# Patient Record
Sex: FEMALE | Race: WHITE | NOT HISPANIC OR LATINO | Employment: FULL TIME | URBAN - METROPOLITAN AREA
[De-identification: names, ages, dates, MRNs, and addresses within clinical notes are randomized per-mention and may not be internally consistent; named-entity substitution may affect disease eponyms.]

---

## 2017-01-13 ENCOUNTER — GENERIC CONVERSION - ENCOUNTER (OUTPATIENT)
Dept: OTHER | Facility: OTHER | Age: 27
End: 2017-01-13

## 2017-01-23 ENCOUNTER — ALLSCRIPTS OFFICE VISIT (OUTPATIENT)
Dept: OTHER | Facility: OTHER | Age: 27
End: 2017-01-23

## 2017-01-25 LAB — PAP (HISTORICAL): NORMAL

## 2018-01-10 NOTE — MISCELLANEOUS
Message   Recorded as Task   Date: 02/04/2016 09:20 AM, Created By: Marcello Bustamante   Task Name: Med Renewal Request   Assigned To: Parker Marrero   Regarding Patient: Elvia Tim, Status: In Progress   GénesisRhdahlia Noon - 04 Feb 2016 9:20 AM     TASK CREATED  Caller: Self; Renew Medication; [phone] (Home)  Pt left a voicemail for a renewal of her RX  Please Advise  SERVANDO Dinh Gen - 04 Feb 2016 9:21 AM     TASK IN PROGRESS   Janiya Nieto - 04 Feb 2016 9:24 AM     TASK REPLIED TO: Previously Assigned To [facility],Team  Pt will call pharmacy and see what is going on because OCP was just refilled in January 2016 for the year  Active Problems    1  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

## 2018-01-14 VITALS
HEIGHT: 67 IN | WEIGHT: 154 LBS | DIASTOLIC BLOOD PRESSURE: 72 MMHG | SYSTOLIC BLOOD PRESSURE: 110 MMHG | BODY MASS INDEX: 24.17 KG/M2

## 2018-01-15 NOTE — MISCELLANEOUS
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

## 2018-01-16 NOTE — PROGRESS NOTES
Assessment    1  Encounter for gynecological examination without abnormal finding (V72 31) (Z01 419)    Plan  Encounter for gynecological examination without abnormal finding    · Follow-up visit in 1 year Evaluation and Treatment  Follow-up  Status: Hold For -  Scheduling  Requested for: 37YBZ6754   Ordered; For: Encounter for gynecological examination without abnormal finding; Ordered By: Holly Blair Performed:  Due: 35YXO9230  PMH: Contraceptives    · Minastrin 24 Fe 1-20 MG-MCG(24) Oral Tablet Chewable; one tab daily as  directed   Rx By: Holly Blair; Dispense: 0 Days ; #:28 Tablet Chewable; Refill: 12; For: PMH: Contraceptives; HARIS = N; Sent To: [facility]    Discussion/Summary  healthy adult female the risks and benefits of cervical cancer screening were discussed cervical cancer screening is current next cervical cancer screening is due 2017 Breast cancer screening: the risks and benefits of breast cancer screening were discussed and monthly self breast exam was advised  Advice and education were given regarding weight bearing exercise, calcium supplements and vitamin D supplements  Chief Complaint  Pt here for her yearly exam, she has no complaints today  History of Present Illness  HPI: 22year old white female here for yearly exam, no complaints  She is amenorrheic on Minastrin 24  She is sexually active with a partner of 6 years  She declines STD testing  She has her degree in [employer] and is looking for a job  She would love to work for [employer]  She is currently working at [employer] as a  and works as a  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 [facility]  GYN HM, Adult Female [facility]: The patient is being seen for a health maintenance evaluation  The last health maintenance visit was 1 year(s) ago  General Health:   Lifestyle:  She exercises regularly  She exercises 3 or more times per week  She does not use tobacco  She consumes alcohol   She reports occasional alcohol use  She denies drug use  Reproductive health: the patient is premenopausal   she reports no menstrual problems  she uses contraception  For contraception, she uses oral contraception pills  she is sexually active  she denies prior pregnancies  Screening: Cervical cancer screening includes a pap smear performed 4/22/2014,neg  Active Problems    1  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)   2  Fatigue (780 79) (R53 83)    Past Medical History    · History of Contraceptives (V25 02)   · History of Dysmenorrhea (625 3) (N94 6)   · History of breast lump (V13 89) (P02 034)   · History of dysmenorrhea (V13 29) (Z87 42)   · History of sebaceous cyst (V13 3) (Z87 2)   · History of Routine Gynecological Exam With Cervical Pap Smear (V72 31)    Surgical History    · History of Denial Of Any Significant Medical History    Family History    · Family history of Acute Myocardial Infarction (V17 3)   · Family history of Arthritis (V17 7)   · Family history of Cancer   · Family history of Diabetes Mellitus (V18 0)   · Family history of Hypertension (V17 49)   · Family history of Reported Family History Of Heart Disease   · Family history of Stroke Syndrome (V17 1)    · Family history of Reported Family History Of Heart Disease    Social History    · Being A Social Drinker   · Never A Smoker   · Never Used Drugs   · Uses Safety Equipment - Seatbelts    Current Meds   1  Minastrin 24 Fe 1-20 MG-MCG(24) Oral Tablet Chewable; one tab daily as directed; Therapy: 59ZQM5817 to (Last Rx:82Bsy4021)  Requested for: 17XLC0544 Ordered    Allergies    1  No Known Drug Allergies    Vitals   Recorded: 28HAT0438 60:82QS   Systolic 254, LUE, Sitting   Diastolic 88, LUE, Sitting   Height 5 ft 7 in   Weight 150 lb    BMI Calculated 23 49   BSA Calculated 1 79   LMP Amenorrhea     Physical Exam    Constitutional   General appearance: No acute distress, well appearing and well nourished      Neck   Neck: Normal, supple, trachea midline, no masses  Genitourinary   External genitalia: Normal and no lesions appreciated  Vagina: Normal, no lesions or dryness appreciated  Urethra: Normal     Urethral meatus: Normal     Bladder: Normal, soft, non-tender and no prolapse or masses appreciated  Cervix: Normal, no palpable masses  Uterus: Normal, non-tender, not enlarged, and no palpable masses  Adnexa/parametria: Normal, non-tender and no fullness or masses appreciated  Chest   Breasts: Normal and no dimpling or skin changes noted  Abdomen   Abdomen: Normal, non-tender, and no organomegaly noted  Lymphatic   Palpation of lymph nodes in neck, axillae, groin and/or other locations: No lymphadenopathy or masses noted         Signatures   Electronically signed by : Devorah Landeros, Kristen Malik; Elías 15 2016  2:17PM EST                       (Author)    Electronically signed by : Jemima Adam MD; Elías 15 2016  2:33PM EST

## 2018-01-22 ENCOUNTER — ALLSCRIPTS OFFICE VISIT (OUTPATIENT)
Dept: OTHER | Facility: OTHER | Age: 28
End: 2018-01-22

## 2018-01-23 NOTE — PROGRESS NOTES
Assessment   1  Encounter for gynecological examination without abnormal finding (V72 31) (Z01 419)    Plan   Encounter for gynecological examination without abnormal finding    · Follow-up visit in 1 year Evaluation and Treatment  Follow-up  Status: Hold For -    Scheduling  Requested for: 38CYS7671   Ordered; For: Encounter for gynecological examination without abnormal finding; Ordered By: Jorgito Valles Performed:  Due: 11TFK3268    Discussion/Summary   healthy adult female the risks and benefits of cervical cancer screening were discussed cervical cancer screening is current Breast cancer screening: the risks and benefits of breast cancer screening were discussed and monthly self breast exam was advised  Advice and education were given regarding weight bearing exercise, calcium supplements and vitamin D supplements  Chief Complaint   Pt is here for her yearly exam, she has no complaints  History of Present Illness   HPI: 32year old white female here for yearly exam, no complaints  She is amenorrheic on Lo Loestrin and is happy with this  She has the same partner and they are now engaged without a date set  She continues to work at [employer] and loves her job  She has no insurance  GYN HM, Adult Female [facility]: The patient is being seen for a health maintenance evaluation  The last health maintenance visit was 1 year(s) ago  General Health: The patient's health since the last visit is described as good  Lifestyle:  She consumes a diverse and healthy diet  -- She does not have any weight concerns  -- She exercises regularly  She exercises less than three times a week  -- She does not use tobacco -- She consumes alcohol  She reports occasional alcohol use  -- She denies drug use  Reproductive health: the patient is premenopausal--   she uses contraception  For contraception, she uses oral contraception pills  -- she is sexually active  -- she denies prior pregnancies      Screening: Cervical cancer screening includes a pap smear performed 1/23/2017,neg  Active Problems   1  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)   2  Encounter for gynecological examination without abnormal finding (V72 31) (Z01 419)    Past Medical History    · History of Contraceptives (V25 02)   · History of Dysmenorrhea (625 3) (N94 6)   · History of breast lump (V13 89) (E19 396)   · History of dysmenorrhea (V13 29) (Z87 42)   · History of sebaceous cyst (V13 3) (Z87 2)   · History of Routine Gynecological Exam With Cervical Pap Smear (V72 31)    Surgical History    · History of Denial Of Any Significant Medical History    Family History   Mother    · Family history of Acute Myocardial Infarction (V17 3)   · Family history of Arthritis (V17 7)   · Family history of Cancer   · Family history of Diabetes Mellitus (V18 0)   · Family history of Hypertension (V17 49)   · Family history of Reported Family History Of Heart Disease   · Family history of Stroke Syndrome (V17 1)  Father    · Family history of Reported Family History Of Heart Disease    Social History    · Being A Social Drinker   · Never A Smoker   · Never Used Drugs   · Uses Safety Equipment - Seatbelts    Current Meds    1  Biotin CAPS; Therapy: (Teressa Hemp) to Recorded   2  Lo Loestrin Fe 1 MG-10 MCG / 10 MCG Oral Tablet; One po daily; Therapy: 31ATV8257 to (Evaluate:17Xok6214); Last Rx:23Jan2017 Ordered   3  Multivitamins TABS; Therapy: (Recorded:22Jan2018) to Recorded    Allergies   1  No Known Drug Allergies    Vitals    Recorded: 57TRE6410 65:02GV   Systolic 922, LUE, Sitting   Diastolic 78, LUE, Sitting   Height 5 ft 7 in   Weight 161 lb    BMI Calculated 25 22   BSA Calculated 1 84   LMP Amenorrhea     Physical Exam        Constitutional      General appearance: No acute distress, well appearing and well nourished  Neck      Neck: Normal, supple, trachea midline, no masses         Genitourinary      External genitalia: Normal and no lesions appreciated  Vagina: Normal, no lesions or dryness appreciated  Urethra: Normal        Urethral meatus: Normal        Bladder: Normal, soft, non-tender and no prolapse or masses appreciated  Cervix: Normal, no palpable masses  Uterus: Normal, non-tender, not enlarged, and no palpable masses  Adnexa/parametria: Normal, non-tender and no fullness or masses appreciated  Chest      Breasts: Normal and no dimpling or skin changes noted  Abdomen      Abdomen: Normal, non-tender, and no organomegaly noted  Lymphatic      Palpation of lymph nodes in neck, axillae, groin and/or other locations: No lymphadenopathy or masses noted         Signatures    Electronically signed by : Lilian Corley, [facility]; Jan 22 2018 11:56AM EST                       (Author)     Electronically signed by : Lemuel Taylor MD; Jan 22 2018 12:30PM EST

## 2018-12-03 ENCOUNTER — TELEPHONE (OUTPATIENT)
Dept: OBGYN CLINIC | Facility: CLINIC | Age: 28
End: 2018-12-03

## 2018-12-03 NOTE — TELEPHONE ENCOUNTER
Pt is self pay and cannot afford to come for her next yearly  She wants to try a mail in  [address] [place] [address] Rd- can she get a prescription to ? Pt wants the progesterone only pill that skips the placebo pills  Pt will be out of pills in 2 wks  She wants to use a mail order for the prescription  Is not due for a yearly till after 1/18/19  Can she get an rx? There are no appointments available with [facility] in this time frame

## 2018-12-03 NOTE — TELEPHONE ENCOUNTER
When I saw her last she was on Lo Loestrin so I'm not sure where the progesterone only idea is coming in    I have no problem giving her pills x 3 months but will need an appt after that   She had no insurance last time she came in, too

## 2018-12-04 NOTE — TELEPHONE ENCOUNTER
The least inexpensive pill she can find is the sprintec, can you tell her the difference in the two and what it may do to her in terms of side effects

## 2018-12-05 NOTE — TELEPHONE ENCOUNTER
Sorry, I'm not following this  One note says she is going to continue on Lo Loestrin  The next says she wants Sprintec  What does she actually want? They are totally different pills    With Sprintec she should get a regular period that is heavier than on Lo Loestrin

## 2018-12-05 NOTE — TELEPHONE ENCOUNTER
Continuing on the same thread    Pt can only afford sprintec, she just wants to know what your thoughts were on it, I will tell her she will have a longer period on it

## 2019-04-21 ENCOUNTER — APPOINTMENT (OUTPATIENT)
Dept: RADIOLOGY | Facility: MEDICAL CENTER | Age: 29
End: 2019-04-21
Payer: COMMERCIAL

## 2019-04-21 ENCOUNTER — OFFICE VISIT (OUTPATIENT)
Dept: URGENT CARE | Facility: MEDICAL CENTER | Age: 29
End: 2019-04-21
Payer: COMMERCIAL

## 2019-04-21 VITALS
HEIGHT: 67 IN | DIASTOLIC BLOOD PRESSURE: 84 MMHG | OXYGEN SATURATION: 100 % | SYSTOLIC BLOOD PRESSURE: 132 MMHG | RESPIRATION RATE: 18 BRPM | HEART RATE: 90 BPM | WEIGHT: 158.4 LBS | TEMPERATURE: 98.4 F | BODY MASS INDEX: 24.86 KG/M2

## 2019-04-21 DIAGNOSIS — R07.9 CHEST PAIN, UNSPECIFIED TYPE: Primary | ICD-10-CM

## 2019-04-21 DIAGNOSIS — R07.9 CHEST PAIN, UNSPECIFIED TYPE: ICD-10-CM

## 2019-04-21 LAB
ATRIAL RATE: 76 BPM
P AXIS: 66 DEGREES
PR INTERVAL: 128 MS
QRS AXIS: 53 DEGREES
QRSD INTERVAL: 76 MS
QT INTERVAL: 398 MS
QTC INTERVAL: 447 MS
T WAVE AXIS: 53 DEGREES
VENTRICULAR RATE: 76 BPM

## 2019-04-21 PROCEDURE — G0382 LEV 3 HOSP TYPE B ED VISIT: HCPCS | Performed by: PHYSICIAN ASSISTANT

## 2019-04-21 PROCEDURE — 93005 ELECTROCARDIOGRAM TRACING: CPT | Performed by: PHYSICIAN ASSISTANT

## 2019-04-21 PROCEDURE — 93010 ELECTROCARDIOGRAM REPORT: CPT | Performed by: INTERNAL MEDICINE

## 2019-04-21 PROCEDURE — 71046 X-RAY EXAM CHEST 2 VIEWS: CPT

## 2019-05-09 ENCOUNTER — OFFICE VISIT (OUTPATIENT)
Dept: CARDIOLOGY CLINIC | Facility: CLINIC | Age: 29
End: 2019-05-09

## 2019-05-09 VITALS
SYSTOLIC BLOOD PRESSURE: 130 MMHG | WEIGHT: 160 LBS | HEIGHT: 67 IN | BODY MASS INDEX: 25.11 KG/M2 | DIASTOLIC BLOOD PRESSURE: 68 MMHG | HEART RATE: 92 BPM

## 2019-05-09 DIAGNOSIS — R07.9 CHEST PAIN SYNDROME: Primary | ICD-10-CM

## 2019-05-09 PROCEDURE — 99203 OFFICE O/P NEW LOW 30 MIN: CPT | Performed by: INTERNAL MEDICINE

## 2019-05-16 ENCOUNTER — HOSPITAL ENCOUNTER (OUTPATIENT)
Dept: NON INVASIVE DIAGNOSTICS | Facility: CLINIC | Age: 29
Discharge: HOME | End: 2019-05-16

## 2019-05-16 DIAGNOSIS — R07.9 CHEST PAIN SYNDROME: ICD-10-CM

## 2019-05-16 LAB
CHEST PAIN STATEMENT: NORMAL
MAX DIASTOLIC BP: 80 MMHG
MAX HEART RATE: 190 BPM
MAX PREDICTED HEART RATE: 192 BPM
MAX. SYSTOLIC BP: 154 MMHG
PROTOCOL NAME: NORMAL
REASON FOR TERMINATION: NORMAL
TARGET HR FORMULA: NORMAL
TEST INDICATION: NORMAL
TIME IN EXERCISE PHASE: NORMAL

## 2019-05-16 PROCEDURE — 93016 CV STRESS TEST SUPVJ ONLY: CPT | Performed by: INTERNAL MEDICINE

## 2019-05-16 PROCEDURE — 93018 CV STRESS TEST I&R ONLY: CPT | Performed by: INTERNAL MEDICINE

## 2019-05-16 PROCEDURE — 93017 CV STRESS TEST TRACING ONLY: CPT

## 2019-05-20 ENCOUNTER — TELEPHONE (OUTPATIENT)
Dept: CARDIOLOGY CLINIC | Facility: CLINIC | Age: 29
End: 2019-05-20

## 2019-07-19 NOTE — PROGRESS NOTES
Assessment/Plan:  Problem List Items Addressed This Visit        Cardiovascular and Mediastinum    Migraines     Uncontrolled  To consider migraine prophy in future as patient declines today  Start Compazine 10mg q8 hours PRN, restart Maxalt-MLT 10mg PRN  Start Magnesium oxide 200mg QD  Relevant Medications    ibuprofen (MOTRIN) 600 mg tablet    prochlorperazine (COMPAZINE) 10 mg tablet    rizatriptan (MAXALT-MLT) 10 MG disintegrating tablet    Other Relevant Orders    Magnesium (Completed)       Musculoskeletal and Integument    Lumbar herniated disc     Pending Physiatry consult  Genitourinary    Dysmenorrhea     Management per Gyn  On OCP  Relevant Orders    CBC and differential (Completed)       Other    Overweight     Stable  Patient is a  has increased muscle mass  Recommend lifestyle modifications  Relevant Orders    TSH, 3rd generation with Free T4 reflex (Completed)    Iron deficiency anemia     Pending labs  Relevant Medications    ferrous sulfate 325 (65 Fe) mg tablet    Other Relevant Orders    CBC and differential (Completed)    Iron, TIBC and Ferritin Panel    Retic Count (Completed)      Other Visit Diagnoses     Annual physical exam    -  Primary  Recommend lifestyle modifications  Screening for diabetes mellitus        Relevant Orders    Hemoglobin A1C    Fatigue, unspecified type        Relevant Orders    CBC and differential (Completed)    Comprehensive metabolic panel (Completed)    TSH, 3rd generation with Free T4 reflex (Completed)    Magnesium (Completed)    Iron, TIBC and Ferritin Panel    Retic Count (Completed)    Screening for cardiovascular condition        Encounter for immunization        Relevant Orders    TDAP VACCINE GREATER THAN OR EQUAL TO 6YO IM (Completed)    BMI 26 0-26 9,adult        Acute pain of right shoulder      Likely due to rotator cuff strain  Home Exercise Program given    Motrin/Tylenol PRN         Rotator cuff strain, right, initial encounter        Relevant Orders    Ambulatory referral to Physical Medicine Rehab    Ambulatory referral to Physical Therapy    See above  Pain of both hip joints        Relevant Orders    Ambulatory referral to Physical Medicine Rehab    Ambulatory referral to Physical Therapy    Likely due to hip flexor strain  Home Exercise Program given  Motrin/Tylneol PRN  Lumbar back pain        Relevant Orders    Ambulatory referral to Physical Medicine Rehab    Ambulatory referral to Physical Therapy    Myalgia        Relevant Orders    Vitamin D 25 hydroxy    Screening for lipoid disorders        Relevant Orders    Lipid panel (Completed)           Return in about 6 weeks (around 9/2/2019) for Recheck Fatigue, Migraines, Myalgias, OW, Labs  Future Appointments   Date Time Provider Sony De Santiago   8/1/2019  3:00 PM Rafa Delgado, PT [facility]   9/3/2019  1:40 PM Sophia Mariano DO [facility]        Subjective:     Cassidy Choudhury is a 29 y o  female who presents today as a new patient for her medical conditions  New Patient    Previous PCP:  Dr Nicanor Juarez  Reason for Transfer:  [address] seen by previous PCP:  2015  Last Labs:  12/7/15  Last Physical:  2014  Medical Records Requsted:  No      HPI:  Chief Complaint   Patient presents with    Physical Exam    Migraine     known history but getting worse     Fatigue    Spasms     Rt arm and b/l hips  -- Above per clinical staff and reviewed  --      HPI      Today:      Overweight - Watching diet  +Exercise - Works out for 1-2 hours - Cardio, lifting, 6-7 days per week  Fatigue - Symptoms x since 5/19  H/O Fe Def Anemia - used to take oral supplements, then D/C due to GI upset and symptom improvement  Stopped taking Fe supplement a few weeks ago  Right Shoulder Pain / Right Arm Spasm / B/L Hips - Feels spasms are pulling joints out of sockets  Has decreased ROM of R shoulder with overheard reach and behind back  Using Motrin 600mg PRN 2 times per week s benefit  Migraines - Symptoms since 15yo  Previously occurring 1-2 times per month, now 2 times per week for the past few weeks  Chronically poor sleep  Has tension in neck and shoulder  Previously on Maxalt MLT (dose unknown) - sometimes helpful  Using Excedrin PRN - helpful  Improves c sleep, ice pack, bitemporal massage  Denies aura  Occurs upon awakening in the AM or at the end of the day  Bi-temporal headache, across forward, in band distribution  Intense pressure  Worse c movement, photophobia, phonophobia  Sometimes has associated nausea and vertigo  Sometimes lasts 6-12 hours to 2-3 days  Pushes fluids - about 150oz water daily  Herniated Lumbar Disc - s/p LAINEY at 15yo in [place] - helpful  Worsening back pain  Pending appt c Physiology Dr Vandana Flores at [facility] next week  Reviewed:  Labs 12/7/15, Cardio 5/9/19, Gyn 1/22/18    Dysmenorrhea - Sees Gyn Ms  Subhashalisha Fuentes PA-C at [address] [place] [address] yearly  Next appt 1/19 - overdue  Skips placebo week of OCP  Thinks had Td 2008 / 2010 - Td  Overdue for Dentist   Nat Helton q2 years  PHQ-9 Depression Screening    PHQ-9:    Frequency of the following problems over the past two weeks:       Little interest or pleasure in doing things:  0 - not at all  Feeling down, depressed, or hopeless:  0 - not at all  PHQ-2 Score:  0       LINDA-7 Flowsheet Screening      Most Recent Value   Over the last two weeks, how often have you been bothered by the following problems?     Feeling nervous, anxious, or on edge  0   Not being able to stop or control worrying  0   Worrying too much about different things  1   Trouble relaxing   1   Being so restless that it's hard to sit still  0   Becoming easily annoyed or irritable   1   Feeling afraid as if something awful might happen  0   How difficult have these problems made it for you to do your work, take care of things at home, or get along with other people? Not difficult at all   LINDA Score   3        MDQ:  0    The following portions of the patient's history were reviewed and updated as appropriate: allergies, current medications, past family history, past medical history, past social history, past surgical history and problem list       Review of Systems   Constitutional: Positive for fatigue  Negative for appetite change, chills, diaphoresis and fever  Respiratory: Negative for chest tightness and shortness of breath  Cardiovascular: Negative for chest pain  Gastrointestinal: Negative for abdominal pain, blood in stool, diarrhea, nausea and vomiting  Genitourinary: Negative for dysuria  Current Outpatient Medications   Medication Sig Dispense Refill    b complex vitamins capsule Take 1 capsule by mouth daily      ferrous sulfate 325 (65 Fe) mg tablet Take 325 mg by mouth daily with breakfast      ibuprofen (MOTRIN) 600 mg tablet Take 600 mg by mouth every 8 (eight) hours as needed for moderate pain       Multiple Vitamins-Minerals (HAIR SKIN & NAILS ADVANCED PO) Take 1 tablet by mouth daily      Norethin-Eth Estrad-Fe Biphas (LO LOESTRIN FE) 1 MG-10 MCG / 10 MCG TABS Take 1 tablet by mouth daily Rx per Gyn      Multiple Vitamin (MULTIVITAMINS PO) Take 1 tablet by mouth daily       prochlorperazine (COMPAZINE) 10 mg tablet Take 1 tablet (10 mg total) by mouth every 8 (eight) hours as needed for nausea or vomiting (Migraine) 30 tablet 0    rizatriptan (MAXALT-MLT) 10 MG disintegrating tablet Take 1 tablet (10 mg total) by mouth once as needed for migraine (May repeat once 2 hours later if migraine symptoms persist   Max dose 30mg/24 hours ) for up to 1 dose May repeat in 2 hours if needed 9 tablet 0     No current facility-administered medications for this visit          Objective:  /80 (BP Location: Left arm)   Pulse 85   Temp 98 7 °F (37 1 °C)   Resp 12   Ht 5' 6 14" (1 68 m)   Wt 74 2 kg (163 lb 9 6 oz)   LMP 01/01/2012 (Within Years) Comment: Skips placebo week of OCP  SpO2 95%   Breastfeeding? No   BMI 26 29 kg/m²    Wt Readings from Last 3 Encounters:   07/22/19 74 2 kg (163 lb 9 6 oz)   05/09/19 72 6 kg (160 lb)   04/21/19 71 8 kg (158 lb 6 4 oz)      BP Readings from Last 3 Encounters:   07/22/19 122/80   05/09/19 130/68   04/21/19 132/84          Physical Exam   Constitutional: She is oriented to person, place, and time  She appears well-developed and well-nourished  HENT:   Head: Normocephalic and atraumatic  Right Ear: Tympanic membrane, external ear and ear canal normal    Left Ear: Tympanic membrane, external ear and ear canal normal    Nose: Nose normal  Right sinus exhibits no maxillary sinus tenderness and no frontal sinus tenderness  Left sinus exhibits no maxillary sinus tenderness and no frontal sinus tenderness  Mouth/Throat: Uvula is midline, oropharynx is clear and moist and mucous membranes are normal  No tonsillar exudate  Eyes: Pupils are equal, round, and reactive to light  Conjunctivae and EOM are normal    Neck: Neck supple  Cardiovascular: Normal rate, regular rhythm, normal heart sounds and intact distal pulses  Pulmonary/Chest: Effort normal and breath sounds normal    Abdominal: Soft  Bowel sounds are normal  She exhibits no distension and no mass  There is no tenderness  There is no rebound and no guarding  Musculoskeletal: She exhibits no edema  Right shoulder: She exhibits decreased range of motion (External rotation) and pain (With abduction above 90 degrees)  She exhibits no tenderness, no bony tenderness, no swelling, no effusion, no crepitus, no deformity, no laceration, no spasm, normal pulse and normal strength  Left shoulder: Normal         Right hip: Normal         Left hip: She exhibits tenderness (With internal rotation)   She exhibits normal range of motion, normal strength, no bony tenderness, no swelling, no crepitus, no deformity and no laceration  Lumbar back: She exhibits tenderness (B/L Paraspinal L4-L5)  She exhibits normal range of motion, no bony tenderness, no swelling, no edema, no deformity, no laceration, no pain, no spasm and normal pulse  B/L Clavicles stable  B/L Hips stable  Negative SLR B/L   Lymphadenopathy:     She has no cervical adenopathy  Neurological: She is alert and oriented to person, place, and time  She displays normal reflexes  No cranial nerve deficit or sensory deficit  She exhibits normal muscle tone  Coordination normal    Skin: Capillary refill takes less than 2 seconds  No rash noted  Psychiatric: She has a normal mood and affect  Her behavior is normal  Judgment and thought content normal    Nursing note and vitals reviewed  Lab Results:      Lab Results   Component Value Date    WBC 4 09 (L) 07/22/2019    HGB 13 4 07/22/2019    HCT 41 8 07/22/2019     07/22/2019    TRIG 34 07/22/2019    HDL 78 (H) 07/22/2019    ALT 23 07/22/2019    AST 16 07/22/2019     07/01/2014    K 4 5 07/22/2019     07/22/2019    CREATININE 0 86 07/22/2019    BUN 14 07/22/2019    CO2 28 07/22/2019    GLUF 91 07/22/2019     No results found for: URICACID  Invalid input(s): BASENAME Vitamin D    No results found  POCT Labs              BMI Counseling: Body mass index is 26 29 kg/m²  Discussed the patient's BMI with her  The BMI is above average  BMI counseling and education was provided to the patient  Nutrition recommendations include decreasing overall calorie intake  Exercise recommendations include exercising 3-5 times per week

## 2019-07-22 ENCOUNTER — LAB (OUTPATIENT)
Dept: LAB | Facility: CLINIC | Age: 29
End: 2019-07-22
Payer: COMMERCIAL

## 2019-07-22 ENCOUNTER — OFFICE VISIT (OUTPATIENT)
Dept: FAMILY MEDICINE CLINIC | Facility: CLINIC | Age: 29
End: 2019-07-22
Payer: COMMERCIAL

## 2019-07-22 ENCOUNTER — TRANSCRIBE ORDERS (OUTPATIENT)
Dept: LAB | Facility: CLINIC | Age: 29
End: 2019-07-22

## 2019-07-22 VITALS
HEART RATE: 85 BPM | DIASTOLIC BLOOD PRESSURE: 80 MMHG | SYSTOLIC BLOOD PRESSURE: 122 MMHG | OXYGEN SATURATION: 95 % | RESPIRATION RATE: 12 BRPM | WEIGHT: 163.6 LBS | BODY MASS INDEX: 26.29 KG/M2 | HEIGHT: 66 IN | TEMPERATURE: 98.7 F

## 2019-07-22 DIAGNOSIS — M25.552 PAIN OF BOTH HIP JOINTS: ICD-10-CM

## 2019-07-22 DIAGNOSIS — N94.6 DYSMENORRHEA: ICD-10-CM

## 2019-07-22 DIAGNOSIS — M25.511 ACUTE PAIN OF RIGHT SHOULDER: ICD-10-CM

## 2019-07-22 DIAGNOSIS — G43.009 MIGRAINE WITHOUT AURA AND WITHOUT STATUS MIGRAINOSUS, NOT INTRACTABLE: ICD-10-CM

## 2019-07-22 DIAGNOSIS — S46.011A ROTATOR CUFF STRAIN, RIGHT, INITIAL ENCOUNTER: ICD-10-CM

## 2019-07-22 DIAGNOSIS — M79.10 MYALGIA: ICD-10-CM

## 2019-07-22 DIAGNOSIS — Z13.1 SCREENING FOR DIABETES MELLITUS: ICD-10-CM

## 2019-07-22 DIAGNOSIS — M25.551 PAIN OF BOTH HIP JOINTS: ICD-10-CM

## 2019-07-22 DIAGNOSIS — R53.83 FATIGUE, UNSPECIFIED TYPE: ICD-10-CM

## 2019-07-22 DIAGNOSIS — M51.26 LUMBAR HERNIATED DISC: ICD-10-CM

## 2019-07-22 DIAGNOSIS — D50.9 IRON DEFICIENCY ANEMIA, UNSPECIFIED IRON DEFICIENCY ANEMIA TYPE: ICD-10-CM

## 2019-07-22 DIAGNOSIS — Z13.220 SCREENING FOR LIPOID DISORDERS: ICD-10-CM

## 2019-07-22 DIAGNOSIS — E66.3 OVERWEIGHT: ICD-10-CM

## 2019-07-22 DIAGNOSIS — M54.50 LUMBAR BACK PAIN: ICD-10-CM

## 2019-07-22 DIAGNOSIS — Z23 ENCOUNTER FOR IMMUNIZATION: ICD-10-CM

## 2019-07-22 DIAGNOSIS — Z00.00 ANNUAL PHYSICAL EXAM: Primary | ICD-10-CM

## 2019-07-22 DIAGNOSIS — R53.83 FATIGUE, UNSPECIFIED TYPE: Primary | ICD-10-CM

## 2019-07-22 DIAGNOSIS — Z13.6 SCREENING FOR CARDIOVASCULAR CONDITION: ICD-10-CM

## 2019-07-22 PROBLEM — R07.9 CHEST PAIN SYNDROME: Status: RESOLVED | Noted: 2019-05-09 | Resolved: 2019-07-22

## 2019-07-22 LAB
25(OH)D3 SERPL-MCNC: 35.2 NG/ML (ref 30–100)
ALBUMIN SERPL BCP-MCNC: 3.6 G/DL (ref 3.5–5)
ALP SERPL-CCNC: 59 U/L (ref 46–116)
ALT SERPL W P-5'-P-CCNC: 23 U/L (ref 12–78)
ANION GAP SERPL CALCULATED.3IONS-SCNC: 7 MMOL/L (ref 4–13)
AST SERPL W P-5'-P-CCNC: 16 U/L (ref 5–45)
BASOPHILS # BLD AUTO: 0.04 THOUSANDS/ΜL (ref 0–0.1)
BASOPHILS NFR BLD AUTO: 1 % (ref 0–1)
BILIRUB SERPL-MCNC: 0.3 MG/DL (ref 0.2–1)
BUN SERPL-MCNC: 14 MG/DL (ref 5–25)
CALCIUM SERPL-MCNC: 8.6 MG/DL (ref 8.3–10.1)
CHLORIDE SERPL-SCNC: 105 MMOL/L (ref 100–108)
CHOLEST SERPL-MCNC: 166 MG/DL (ref 50–200)
CO2 SERPL-SCNC: 28 MMOL/L (ref 21–32)
CREAT SERPL-MCNC: 0.86 MG/DL (ref 0.6–1.3)
EOSINOPHIL # BLD AUTO: 0.03 THOUSAND/ΜL (ref 0–0.61)
EOSINOPHIL NFR BLD AUTO: 1 % (ref 0–6)
ERYTHROCYTE [DISTWIDTH] IN BLOOD BY AUTOMATED COUNT: 13.1 % (ref 11.6–15.1)
EST. AVERAGE GLUCOSE BLD GHB EST-MCNC: 91 MG/DL
FERRITIN SERPL-MCNC: 12 NG/ML (ref 8–388)
GFR SERPL CREATININE-BSD FRML MDRD: 92 ML/MIN/1.73SQ M
GLUCOSE P FAST SERPL-MCNC: 91 MG/DL (ref 65–99)
HBA1C MFR BLD: 4.8 % (ref 4.2–6.3)
HCT VFR BLD AUTO: 41.8 % (ref 34.8–46.1)
HDLC SERPL-MCNC: 78 MG/DL (ref 40–60)
HGB BLD-MCNC: 13.4 G/DL (ref 11.5–15.4)
IMM GRANULOCYTES # BLD AUTO: 0.01 THOUSAND/UL (ref 0–0.2)
IMM GRANULOCYTES NFR BLD AUTO: 0 % (ref 0–2)
IRON SERPL-MCNC: 147 UG/DL (ref 50–170)
LDLC SERPL CALC-MCNC: 81 MG/DL (ref 0–100)
LYMPHOCYTES # BLD AUTO: 1.52 THOUSANDS/ΜL (ref 0.6–4.47)
LYMPHOCYTES NFR BLD AUTO: 37 % (ref 14–44)
MAGNESIUM SERPL-MCNC: 2.1 MG/DL (ref 1.6–2.6)
MCH RBC QN AUTO: 30.3 PG (ref 26.8–34.3)
MCHC RBC AUTO-ENTMCNC: 32.1 G/DL (ref 31.4–37.4)
MCV RBC AUTO: 95 FL (ref 82–98)
MONOCYTES # BLD AUTO: 0.41 THOUSAND/ΜL (ref 0.17–1.22)
MONOCYTES NFR BLD AUTO: 10 % (ref 4–12)
NEUTROPHILS # BLD AUTO: 2.08 THOUSANDS/ΜL (ref 1.85–7.62)
NEUTS SEG NFR BLD AUTO: 51 % (ref 43–75)
NONHDLC SERPL-MCNC: 88 MG/DL
NRBC BLD AUTO-RTO: 0 /100 WBCS
PLATELET # BLD AUTO: 262 THOUSANDS/UL (ref 149–390)
PMV BLD AUTO: 9.1 FL (ref 8.9–12.7)
POTASSIUM SERPL-SCNC: 4.5 MMOL/L (ref 3.5–5.3)
PROT SERPL-MCNC: 7.2 G/DL (ref 6.4–8.2)
RBC # BLD AUTO: 4.42 MILLION/UL (ref 3.81–5.12)
RETICS # AUTO: NORMAL 10*3/UL (ref 14097–95744)
RETICS # CALC: 1.09 % (ref 0.37–1.87)
SODIUM SERPL-SCNC: 140 MMOL/L (ref 136–145)
TIBC SERPL-MCNC: 483 UG/DL (ref 250–450)
TRIGL SERPL-MCNC: 34 MG/DL
TSH SERPL DL<=0.05 MIU/L-ACNC: 2.66 UIU/ML (ref 0.36–3.74)
WBC # BLD AUTO: 4.09 THOUSAND/UL (ref 4.31–10.16)

## 2019-07-22 PROCEDURE — 80053 COMPREHEN METABOLIC PANEL: CPT

## 2019-07-22 PROCEDURE — 83550 IRON BINDING TEST: CPT

## 2019-07-22 PROCEDURE — 85025 COMPLETE CBC W/AUTO DIFF WBC: CPT

## 2019-07-22 PROCEDURE — 83036 HEMOGLOBIN GLYCOSYLATED A1C: CPT

## 2019-07-22 PROCEDURE — 84443 ASSAY THYROID STIM HORMONE: CPT

## 2019-07-22 PROCEDURE — 36415 COLL VENOUS BLD VENIPUNCTURE: CPT

## 2019-07-22 PROCEDURE — 80061 LIPID PANEL: CPT

## 2019-07-22 PROCEDURE — 99385 PREV VISIT NEW AGE 18-39: CPT | Performed by: FAMILY MEDICINE

## 2019-07-22 PROCEDURE — 83540 ASSAY OF IRON: CPT

## 2019-07-22 PROCEDURE — 85045 AUTOMATED RETICULOCYTE COUNT: CPT

## 2019-07-22 PROCEDURE — 83735 ASSAY OF MAGNESIUM: CPT

## 2019-07-22 PROCEDURE — 82306 VITAMIN D 25 HYDROXY: CPT

## 2019-07-22 PROCEDURE — 82728 ASSAY OF FERRITIN: CPT

## 2019-07-22 PROCEDURE — 99203 OFFICE O/P NEW LOW 30 MIN: CPT | Performed by: FAMILY MEDICINE

## 2019-07-22 PROCEDURE — 90715 TDAP VACCINE 7 YRS/> IM: CPT

## 2019-07-22 PROCEDURE — 90471 IMMUNIZATION ADMIN: CPT

## 2019-07-22 RX ORDER — VITAMIN B COMPLEX
1 CAPSULE ORAL DAILY
COMMUNITY
End: 2020-10-21

## 2019-07-22 RX ORDER — PROCHLORPERAZINE MALEATE 10 MG
10 TABLET ORAL EVERY 8 HOURS PRN
Qty: 30 TABLET | Refills: 0 | Status: SHIPPED | OUTPATIENT
Start: 2019-07-22 | End: 2019-11-20 | Stop reason: SDUPTHER

## 2019-07-22 RX ORDER — FERROUS SULFATE 325(65) MG
325 TABLET ORAL
COMMUNITY
End: 2020-10-21

## 2019-07-22 RX ORDER — IBUPROFEN 600 MG/1
600 TABLET ORAL EVERY 8 HOURS PRN
COMMUNITY
End: 2020-10-21

## 2019-07-22 RX ORDER — FLUCONAZOLE 150 MG/1
150 TABLET ORAL
COMMUNITY
Start: 2017-01-23 | End: 2019-07-22 | Stop reason: ALTCHOICE

## 2019-07-22 RX ORDER — RIZATRIPTAN BENZOATE 10 MG/1
10 TABLET, ORALLY DISINTEGRATING ORAL ONCE AS NEEDED
Qty: 9 TABLET | Refills: 0 | Status: SHIPPED | OUTPATIENT
Start: 2019-07-22 | End: 2019-11-20 | Stop reason: SDUPTHER

## 2019-07-22 RX ORDER — NICOTINE POLACRILEX 2 MG
GUM BUCCAL
COMMUNITY
End: 2019-07-22

## 2019-07-22 NOTE — PROGRESS NOTES
Assessment/Plan:  Problem List Items Addressed This Visit        Cardiovascular and Mediastinum    Migraines     Uncontrolled  To consider migraine prophy in future as patient declines today  Start Compazine 10mg q8 hours PRN, restart Maxalt-MLT 10mg PRN  Start Magnesium oxide 200mg QD  Relevant Medications    ibuprofen (MOTRIN) 600 mg tablet    prochlorperazine (COMPAZINE) 10 mg tablet    rizatriptan (MAXALT-MLT) 10 MG disintegrating tablet    Other Relevant Orders    Magnesium (Completed)       Musculoskeletal and Integument    Lumbar herniated disc     Pending Physiatry consult  Genitourinary    Dysmenorrhea     Management per Gyn  On OCP  Relevant Orders    CBC and differential (Completed)       Other    Overweight     Stable  Patient is a  has increased muscle mass  Recommend lifestyle modifications  Relevant Orders    TSH, 3rd generation with Free T4 reflex (Completed)    Iron deficiency anemia     Pending labs             Relevant Medications    ferrous sulfate 325 (65 Fe) mg tablet    Other Relevant Orders    CBC and differential (Completed)    Iron, TIBC and Ferritin Panel    Retic Count (Completed)      Other Visit Diagnoses     Annual physical exam    -  Primary    Screening for diabetes mellitus        Relevant Orders    Hemoglobin A1C    Fatigue, unspecified type        Relevant Orders    CBC and differential (Completed)    Comprehensive metabolic panel (Completed)    TSH, 3rd generation with Free T4 reflex (Completed)    Magnesium (Completed)    Iron, TIBC and Ferritin Panel    Retic Count (Completed)    Screening for cardiovascular condition        Encounter for immunization        Relevant Orders    TDAP VACCINE GREATER THAN OR EQUAL TO 6YO IM (Completed)    BMI 26 0-26 9,adult        Acute pain of right shoulder        Rotator cuff strain, right, initial encounter        Relevant Orders    Ambulatory referral to Physical Medicine Rehab Ambulatory referral to Physical Therapy    Pain of both hip joints        Relevant Orders    Ambulatory referral to Physical Medicine Rehab    Ambulatory referral to Physical Therapy    Lumbar back pain        Relevant Orders    Ambulatory referral to Physical Medicine Rehab    Ambulatory referral to Physical Therapy    Myalgia        Relevant Orders    Vitamin D 25 hydroxy    Screening for lipoid disorders        Relevant Orders    Lipid panel (Completed)           Return in about 6 weeks (around 9/2/2019) for Recheck Fatigue, Migraines, Myalgias, OW, Labs  Future Appointments   Date Time Provider Sony De Santiago   8/1/2019  3:00 PM Todd Garland, PT [facility]   9/3/2019  1:40 PM Abdifatah Laboy DO [facility]        Subjective:     Cris Cote is a 29 y o  female who presents today for her physical         HPI:  Chief Complaint   Patient presents with    Physical Exam    Migraine     known history but getting worse     Fatigue    Spasms     Rt arm and b/l hips  -- Above per clinical staff and reviewed  --      HPI      Today:    Overweight - Watching diet  +Exercise - Works out for 1-2 hours - Cardio, lifting, 6-7 days per week  Reviewed:  Labs 12/7/15, Cardio 5/9/19, Gyn 1/22/18    Dysmenorrhea - Sees Gyn Ms  Salvador Orellana PA-C at [address] [facility] yearly  Next appt 1/19 - overdue  Skips placebo week of OCP  Thinks had Td 2008 / 2010 - Td  Overdue for Dentist   Camryn Figueroa q2 years  The following portions of the patient's history were reviewed and updated as appropriate: allergies, current medications, past family history, past medical history, past social history, past surgical history and problem list       Review of Systems     See other note        Current Outpatient Medications   Medication Sig Dispense Refill    b complex vitamins capsule Take 1 capsule by mouth daily      ferrous sulfate 325 (65 Fe) mg tablet Take 325 mg by mouth daily with breakfast      ibuprofen (MOTRIN) 600 mg tablet Take 600 mg by mouth every 8 (eight) hours as needed for moderate pain       Multiple Vitamins-Minerals (HAIR SKIN & NAILS ADVANCED PO) Take 1 tablet by mouth daily      Norethin-Eth Estrad-Fe Biphas (LO LOESTRIN FE) 1 MG-10 MCG / 10 MCG TABS Take 1 tablet by mouth daily Rx per Gyn      Multiple Vitamin (MULTIVITAMINS PO) Take 1 tablet by mouth daily       prochlorperazine (COMPAZINE) 10 mg tablet Take 1 tablet (10 mg total) by mouth every 8 (eight) hours as needed for nausea or vomiting (Migraine) 30 tablet 0    rizatriptan (MAXALT-MLT) 10 MG disintegrating tablet Take 1 tablet (10 mg total) by mouth once as needed for migraine (May repeat once 2 hours later if migraine symptoms persist   Max dose 30mg/24 hours ) for up to 1 dose May repeat in 2 hours if needed 9 tablet 0     No current facility-administered medications for this visit  Objective:  /80 (BP Location: Left arm)   Pulse 85   Temp 98 7 °F (37 1 °C)   Resp 12   Ht 5' 6 14" (1 68 m)   Wt 74 2 kg (163 lb 9 6 oz)   LMP 01/01/2012 (Within Years) Comment: Skips placebo week of OCP  SpO2 95%   Breastfeeding? No   BMI 26 29 kg/m²    Wt Readings from Last 3 Encounters:   07/22/19 74 2 kg (163 lb 9 6 oz)   05/09/19 72 6 kg (160 lb)   04/21/19 71 8 kg (158 lb 6 4 oz)      BP Readings from Last 3 Encounters:   07/22/19 122/80   05/09/19 130/68   04/21/19 132/84          Physical Exam    See other note  Physical Exam   Constitutional: She is oriented to person, place, and time  She appears well-developed and well-nourished  HENT:   Head: Normocephalic and atraumatic  Right Ear: Tympanic membrane, external ear and ear canal normal    Left Ear: Tympanic membrane, external ear and ear canal normal    Nose: Nose normal  Right sinus exhibits no maxillary sinus tenderness and no frontal sinus tenderness  Left sinus exhibits no maxillary sinus tenderness and no frontal sinus tenderness     Mouth/Throat: Uvula is midline, oropharynx is clear and moist and mucous membranes are normal  No tonsillar exudate  Eyes: Pupils are equal, round, and reactive to light  Conjunctivae and EOM are normal    Neck: Neck supple  Cardiovascular: Normal rate, regular rhythm, normal heart sounds and intact distal pulses  Pulmonary/Chest: Effort normal and breath sounds normal    Abdominal: Soft  Bowel sounds are normal  She exhibits no distension and no mass  There is no tenderness  There is no rebound and no guarding  Musculoskeletal: She exhibits no edema  Right shoulder: She exhibits decreased range of motion (External rotation) and pain (With abduction above 90 degrees)  She exhibits no tenderness, no bony tenderness, no swelling, no effusion, no crepitus, no deformity, no laceration, no spasm, normal pulse and normal strength  Left shoulder: Normal         Right hip: Normal         Left hip: She exhibits tenderness (With internal rotation)  She exhibits normal range of motion, normal strength, no bony tenderness, no swelling, no crepitus, no deformity and no laceration  Lumbar back: She exhibits tenderness (B/L Paraspinal L4-L5)  She exhibits normal range of motion, no bony tenderness, no swelling, no edema, no deformity, no laceration, no pain, no spasm and normal pulse  B/L Clavicles stable  B/L Hips stable  Negative SLR B/L   Lymphadenopathy:     She has no cervical adenopathy  Neurological: She is alert and oriented to person, place, and time  She displays normal reflexes  No cranial nerve deficit or sensory deficit  She exhibits normal muscle tone  Coordination normal    Skin: Capillary refill takes less than 2 seconds  No rash noted  Psychiatric: She has a normal mood and affect  Her behavior is normal  Judgment and thought content normal    Nursing note and vitals reviewed      Lab Results:      Lab Results   Component Value Date    WBC 4 09 (L) 07/22/2019    HGB 13 4 07/22/2019 HCT 41 8 07/22/2019     07/22/2019    TRIG 34 07/22/2019    HDL 78 (H) 07/22/2019    ALT 23 07/22/2019    AST 16 07/22/2019     07/01/2014    K 4 5 07/22/2019     07/22/2019    CREATININE 0 86 07/22/2019    BUN 14 07/22/2019    CO2 28 07/22/2019    GLUF 91 07/22/2019     No results found for: URICACID  Invalid input(s): BASENAME Vitamin D    No results found       POCT Labs

## 2019-07-22 NOTE — PATIENT INSTRUCTIONS
Wellness Visit for Adults   AMBULATORY CARE:   A wellness visit  is when you see your healthcare provider to get screened for health problems  You can also get advice on how to stay healthy  Write down your questions so you remember to ask them  Ask your healthcare provider how often you should have a wellness visit  What happens at a wellness visit:  Your healthcare provider will ask about your health, and your family history of health problems  This includes high blood pressure, heart disease, and cancer  He or she will ask if you have symptoms that concern you, if you smoke, and about your mood  You may also be asked about your intake of medicines, supplements, food, and alcohol  Any of the following may be done:  · Your weight  will be checked  Your height may also be checked so your body mass index (BMI) can be calculated  Your BMI shows if you are at a healthy weight  · Your blood pressure  and heart rate will be checked  Your temperature may also be checked  · Blood and urine tests  may be done  Blood tests may be done to check your cholesterol levels  Abnormal cholesterol levels increase your risk for heart disease and stroke  You may also need a blood or urine test to check for diabetes if you are at increased risk  Urine tests may be done to look for signs of an infection or kidney disease  · A physical exam  includes checking your heartbeat and lungs with a stethoscope  Your healthcare provider may also check your skin to look for sun damage  · Screening tests  may be recommended  A screening test is done to check for diseases that may not cause symptoms  The screening tests you may need depend on your age, gender, family history, and lifestyle habits  For example, colorectal screening may be recommended if you are 48years old or older  Screening tests you need if you are a woman:   · A Pap smear  is used to screen for cervical cancer   Pap smears are usually done every 3 to 5 years depending on your age  You may need them more often if you have had abnormal Pap smear test results in the past  Ask your healthcare provider how often you should have a Pap smear  · A mammogram  is an x-ray of your breasts to screen for breast cancer  Experts recommend mammograms every 2 years starting at age 48 years  You may need a mammogram at age 52 years or younger if you have an increased risk for breast cancer  Talk to your healthcare provider about when you should start having mammograms and how often you need them  Vaccines you may need:   · Get an influenza vaccine  every year  The influenza vaccine protects you from the flu  Several types of viruses cause the flu  The viruses change over time, so new vaccines are made each year  · Get a tetanus-diphtheria (Td) booster vaccine  every 10 years  This vaccine protects you against tetanus and diphtheria  Tetanus is a severe infection that may cause painful muscle spasms and lockjaw  Diphtheria is a severe bacterial infection that causes a thick covering in the back of your mouth and throat  · Get a human papillomavirus (HPV) vaccine  if you are female and aged 23 to 32 or male 23 to 24 and never received it  This vaccine protects you from HPV infection  HPV is the most common infection spread by sexual contact  HPV may also cause vaginal, penile, and anal cancers  · Get a pneumococcal vaccine  if you are aged 72 years or older  The pneumococcal vaccine is an injection given to protect you from pneumococcal disease  Pneumococcal disease is an infection caused by pneumococcal bacteria  The infection may cause pneumonia, meningitis, or an ear infection  · Get a shingles vaccine  if you are aged 61 or older, even if you have had shingles before  The shingles vaccine is an injection to protect you from the varicella-zoster virus  This is the same virus that causes chickenpox   Shingles is a painful rash that develops in people who had chickenpox or have been exposed to the virus  How to eat healthy:  My Plate is a model for planning healthy meals  It shows the types and amounts of foods that should go on your plate  Fruits and vegetables make up about half of your plate, and grains and protein make up the other half  A serving of dairy is included on the side of your plate  The amount of calories and serving sizes you need depends on your age, gender, weight, and height  Examples of healthy foods are listed below:  · Eat a variety of vegetables  such as dark green, red, and orange vegetables  You can also include canned vegetables low in sodium (salt) and frozen vegetables without added butter or sauces  · Eat a variety of fresh fruits , canned fruit in 100% juice, frozen fruit, and dried fruit  · Include whole grains  At least half of the grains you eat should be whole grains  Examples include whole-wheat bread, wheat pasta, brown rice, and whole-grain cereals such as oatmeal     · Eat a variety of protein foods such as seafood (fish and shellfish), lean meat, and poultry without skin (turkey and chicken)  Examples of lean meats include pork leg, shoulder, or tenderloin, and beef round, sirloin, tenderloin, and extra lean ground beef  Other protein foods include eggs and egg substitutes, beans, peas, soy products, nuts, and seeds  · Choose low-fat dairy products such as skim or 1% milk or low-fat yogurt, cheese, and cottage cheese  · Limit unhealthy fats  such as butter, hard margarine, and shortening  Exercise:  Exercise at least 30 minutes per day on most days of the week  Some examples of exercise include walking, biking, dancing, and swimming  You can also fit in more physical activity by taking the stairs instead of the elevator or parking farther away from stores  Include muscle strengthening activities 2 days each week  Regular exercise provides many health benefits   It helps you manage your weight, and decreases your risk for type 2 diabetes, heart disease, stroke, and high blood pressure  Exercise can also help improve your mood  Ask your healthcare provider about the best exercise plan for you  General health and safety guidelines:   · Do not smoke  Nicotine and other chemicals in cigarettes and cigars can cause lung damage  Ask your healthcare provider for information if you currently smoke and need help to quit  E-cigarettes or smokeless tobacco still contain nicotine  Talk to your healthcare provider before you use these products  · Limit alcohol  A drink of alcohol is 12 ounces of beer, 5 ounces of wine, or 1½ ounces of liquor  · Lose weight, if needed  Being overweight increases your risk of certain health conditions  These include heart disease, high blood pressure, type 2 diabetes, and certain types of cancer  · Protect your skin  Do not sunbathe or use tanning beds  Use sunscreen with a SPF 15 or higher  Apply sunscreen at least 15 minutes before you go outside  Reapply sunscreen every 2 hours  Wear protective clothing, hats, and sunglasses when you are outside  · Drive safely  Always wear your seatbelt  Make sure everyone in your car wears a seatbelt  A seatbelt can save your life if you are in an accident  Do not use your cell phone when you are driving  This could distract you and cause an accident  Pull over if you need to make a call or send a text message  · Practice safe sex  Use latex condoms if are sexually active and have more than one partner  Your healthcare provider may recommend screening tests for sexually transmitted infections (STIs)  · Wear helmets, lifejackets, and protective gear  Always wear a helmet when you ride a bike or motorcycle, go skiing, or play sports that could cause a head injury  Wear protective equipment when you play sports  Wear a lifejacket when you are on a boat or doing water sports    © 2017 [employer] Information is for End User's use only and may not be sold, redistributed or otherwise used for commercial purposes  All illustrations and images included in CareNotes® are the copyrighted property of [employer]  or [employer]  The above information is an  only  It is not intended as medical advice for individual conditions or treatments  Talk to your doctor, nurse or pharmacist before following any medical regimen to see if it is safe and effective for you  Weight Management   AMBULATORY CARE:   Why it is important to manage your weight:  Being overweight increases your risk of health conditions such as heart disease, high blood pressure, type 2 diabetes, and certain types of cancer  It can also increase your risk for osteoarthritis, sleep apnea, and other respiratory problems  Aim for a slow, steady weight loss  Even a small amount of weight loss can lower your risk of health problems  How to lose weight safely:  A safe and healthy way to lose weight is to eat fewer calories and get regular exercise  You can lose up about 1 pound a week by decreasing the number of calories you eat by 500 calories each day  You can decrease calories by eating smaller portion sizes or by cutting out high-calorie foods  Read labels to find out how many calories are in the foods you eat  You can also burn calories with exercise such as walking, swimming, or biking  You will be more likely to keep weight off if you make these changes part of your lifestyle  Healthy meal plan for weight management:  A healthy meal plan includes a variety of foods, contains fewer calories, and helps you stay healthy  A healthy meal plan includes the following:  · Eat whole-grain foods more often  A healthy meal plan should contain fiber  Fiber is the part of grains, fruits, and vegetables that is not broken down by your body  Whole-grain foods are healthy and provide extra fiber in your diet   Some examples of whole-grain foods are whole-wheat breads and pastas, oatmeal, brown rice, and bulgur  · Eat a variety of vegetables every day  Include dark, leafy greens such as spinach, kale, gail greens, and mustard greens  Eat yellow and orange vegetables such as carrots, sweet potatoes, and winter squash  · Eat a variety of fruits every day  Choose fresh or canned fruit (canned in its own juice or light syrup) instead of juice  Fruit juice has very little or no fiber  · Eat low-fat dairy foods  Drink fat-free (skim) milk or 1% milk  Eat fat-free yogurt and low-fat cottage cheese  Try low-fat cheeses such as mozzarella and other reduced-fat cheeses  · Choose meat and other protein foods that are low in fat  Choose beans or other legumes such as split peas or lentils  Choose fish, skinless poultry (chicken or turkey), or lean cuts of red meat (beef or pork)  Before you cook meat or poultry, cut off any visible fat  · Use less fat and oil  Try baking foods instead of frying them  Add less fat, such as margarine, sour cream, regular salad dressing and mayonnaise to foods  Eat fewer high-fat foods  Some examples of high-fat foods include french fries, doughnuts, ice cream, and cakes  · Eat fewer sweets  Limit foods and drinks that are high in sugar  This includes candy, cookies, regular soda, and sweetened drinks  Ways to decrease calories:   · Eat smaller portions  ¨ Use a small plate with smaller servings  ¨ Do not eat second helpings  ¨ When you eat at a restaurant, ask for a box and place half of your meal in the box before you eat  ¨ Share an entrée with someone else  · Replace high-calorie snacks with healthy, low-calorie snacks  ¨ Choose fresh fruit, vegetables, fat-free rice cakes, or air-popped popcorn instead of potato chips, nuts, or chocolate  ¨ Choose water or calorie-free drinks instead of soda or sweetened drinks  · Eat regular meals  Skipping meals can lead to overeating later in the day   Eat a healthy snack in place of a meal if you do not have time to eat a regular meal      · Do not shop for groceries when you are hungry  You may be more likely to make unhealthy food choices  Take a grocery list of healthy foods and shop after you have eaten  Exercise:  Exercise at least 30 minutes per day on most days of the week  Some examples of exercise include walking, biking, dancing, and swimming  You can also fit in more physical activity by taking the stairs instead of the elevator or parking farther away from stores  Ask your healthcare provider about the best exercise plan for you  Other things to consider as you try to lose weight:   · Be aware of situations that may give you the urge to overeat, such as eating while watching television  Find ways to avoid these situations  For example, read a book, go for a walk, or do crafts  · Meet with a weight loss support group or friends who are also trying to lose weight  This may help you stay motivated to continue working on your weight loss goals  © 2017 [address] [employer] Information is for End User's use only and may not be sold, redistributed or otherwise used for commercial purposes  All illustrations and images included in CareNotes® are the copyrighted property of [employer]  or [employer]  The above information is an  only  It is not intended as medical advice for individual conditions or treatments  Talk to your doctor, nurse or pharmacist before following any medical regimen to see if it is safe and effective for you  Low Fat Diet   AMBULATORY CARE:   A low-fat diet  is an eating plan that is low in total fat, unhealthy fat, and cholesterol  You may need to follow a low-fat diet if you have trouble digesting or absorbing fat  You may also need to follow this diet if you have high cholesterol  You can also lower your cholesterol by increasing the amount of fiber in your diet   Soluble fiber is a type of fiber that helps to decrease cholesterol levels  Different types of fat in food:   · Limit unhealthy fats  A diet that is high in cholesterol, saturated fat, and trans fat may cause unhealthy cholesterol levels  Unhealthy cholesterol levels increase your risk of heart disease  ¨ Cholesterol:  Limit intake of cholesterol to less than 200 mg per day  Cholesterol is found in meat, eggs, and dairy  ¨ Saturated fat:  Limit saturated fat to less than 7% of your total daily calories  Ask your dietitian how many calories you need each day  Saturated fat is found in butter, cheese, ice cream, whole milk, and palm oil  Saturated fat is also found in meat, such as beef, pork, chicken skin, and processed meats  Processed meats include sausage, hot dogs, and bologna  ¨ Trans fat:  Avoid trans fat as much as possible  Trans fat is used in fried and baked foods  Foods that say trans fat free on the label may still have up to 0 5 grams of trans fat per serving  · Include healthy fats  Replace foods that are high in saturated and trans fat with foods high in healthy fats  This may help to decrease high cholesterol levels  ¨ Monounsaturated fats: These are found in avocados, nuts, and vegetable oils, such as olive, canola, and sunflower oil  ¨ Polyunsaturated fats: These can be found in vegetable oils, such as soybean or corn oil  Omega-3 fats can help to decrease the risk of heart disease  Omega-3 fats are found in fish, such as salmon, herring, trout, and tuna  Omega-3 fats can also be found in plant foods, such as walnuts, flaxseed, soybeans, and canola oil    Foods to limit or avoid:   · Grains:      ¨ Snacks that are made with partially hydrogenated oils, such as chips, regular crackers, and butter-flavored popcorn    ¨ High-fat baked goods, such as biscuits, croissants, doughnuts, pies, cookies, and pastries    · Dairy:      ¨ Whole milk, 2% milk, and yogurt and ice cream made with whole milk    ¨ Half and half creamer, heavy cream, and whipping cream    ¨ Cheese, cream cheese, and sour cream    · Meats and proteins:      ¨ High-fat cuts of meat (T-bone steak, regular hamburger, and ribs)    ¨ Fried meat, poultry (turkey and chicken), and fish    ¨ Poultry (chicken and turkey) with skin    ¨ Cold cuts (salami or bologna), hot dogs, knox, and sausage    ¨ Whole eggs and egg yolks    · Vegetables and fruits with added fat:      ¨ Fried vegetables or vegetables in butter or high-fat sauces, such as cream or cheese sauces    ¨ Fried fruit or fruit served with butter or cream    · Fats:      ¨ Butter, stick margarine, and shortening    ¨ Coconut, palm oil, and palm kernel oil  Foods to include:   · Grains:      ¨ Whole-grain breads, cereals, pasta, and brown rice    ¨ Low-fat crackers and pretzels    · Vegetables and fruits:      ¨ Fresh, frozen, or canned vegetables (no salt or low-sodium)    ¨ Fresh, frozen, dried, or canned fruit (canned in light syrup or fruit juice)    ¨ Avocado    · Low-fat dairy products:      ¨ Nonfat (skim) or 1% milk    ¨ Nonfat or low-fat cheese, yogurt, and cottage cheese    · Meats and proteins:      ¨ Chicken or turkey with no skin    ¨ Baked or broiled fish    ¨ Lean beef and pork (loin, round, extra lean hamburger)    ¨ Beans and peas, unsalted nuts, soy products    ¨ Egg whites and substitutes    ¨ Seeds and nuts    · Fats:      ¨ Unsaturated oil, such as canola, olive, peanut, soybean, or sunflower oil    ¨ Soft or liquid margarine and vegetable oil spread    ¨ Low-fat salad dressing  Other ways to decrease fat:   · Read food labels before you buy foods  Choose foods that have less than 30% of calories from fat  Choose low-fat or fat-free dairy products  Remember that fat free does not mean calorie free  These foods still contain calories, and too many calories can lead to weight gain  · Trim fat from meat and avoid fried food  Trim all visible fat from meat before you cook it   Remove the skin from poultry  Do not vogel meat, fish, or poultry  Bake, roast, boil, or broil these foods instead  Avoid fried foods  Eat a baked potato instead of [place] [employer]ie fries  Steam vegetables instead of sautéing them in butter  · Add less fat to foods  Use imitation knox bits on salads and baked potatoes instead of regular knox bits  Use fat-free or low-fat salad dressings instead of regular dressings  Use low-fat or nonfat butter-flavored topping instead of regular butter or margarine on popcorn and other foods  Ways to decrease fat in recipes:  Replace high-fat ingredients with low-fat or nonfat ones  This may cause baked goods to be drier than usual  You may need to use nonfat cooking spray on pans to prevent food from sticking  You also may need to change the amount of other ingredients, such as water, in the recipe  Try the following:  · Use low-fat or light margarine instead of regular margarine or shortening  · Use lean ground turkey breast or chicken, or lean ground beef (less than 5% fat) instead of hamburger  · Add 1 teaspoon of canola oil to 8 ounces of skim milk instead of using cream or half and half  · Use grated zucchini, carrots, or apples in breads instead of coconut  · Use blenderized, low-fat cottage cheese, plain tofu, or low-fat ricotta cheese instead of cream cheese  · Use 1 egg white and 1 teaspoon of canola oil, or use ¼ cup (2 ounces) of fat-free egg substitute instead of a whole egg  · Replace half of the oil that is called for in a recipe with applesauce when you bake  Use 3 tablespoons of cocoa powder and 1 tablespoon of canola oil instead of a square of baking chocolate  How to increase fiber:  Eat enough high-fiber foods to get 20 to 30 grams of fiber every day  Slowly increase your fiber intake to avoid stomach cramps, gas, and other problems  · Eat 3 ounces of whole-grain foods each day  An ounce is about 1 slice of bread   Eat whole-grain breads, such as whole-wheat bread  Whole wheat, whole-wheat flour, or other whole grains should be listed as the first ingredient on the food label  Replace white flour with whole-grain flour or use half of each in recipes  Whole-grain flour is heavier than white flour, so you may have to add more yeast or baking powder  · Eat a high-fiber cereal for breakfast   Oatmeal is a good source of soluble fiber  Look for cereals that have bran or fiber in the name  Choose whole-grain products, such as brown rice, barley, and whole-wheat pasta  · Eat more beans, peas, and lentils  For example, add beans to soups or salads  Eat at least 5 cups of fruits and vegetables each day  Eat fruits and vegetables with the peel because the peel is high in fiber  © 2017 [address] [employer] Information is for End User's use only and may not be sold, redistributed or otherwise used for commercial purposes  All illustrations and images included in CareNotes® are the copyrighted property of [employer]  or [employer]  The above information is an  only  It is not intended as medical advice for individual conditions or treatments  Talk to your doctor, nurse or pharmacist before following any medical regimen to see if it is safe and effective for you  Heart Healthy Diet   AMBULATORY CARE:   A heart healthy diet  is an eating plan low in total fat, unhealthy fats, and sodium (salt)  A heart healthy diet helps decrease your risk for heart disease and stroke  Limit the amount of fat you eat to 25% to 35% of your total daily calories  Limit sodium to less than 2,300 mg each day  Healthy fats:  Healthy fats can help improve cholesterol levels  The risk for heart disease is decreased when cholesterol levels are normal  Choose healthy fats, such as the following:  · Unsaturated fat  is found in foods such as soybean, canola, olive, corn, and safflower oils   It is also found in soft tub margarine that is made with liquid vegetable oil  · Omega-3 fat  is found in certain fish, such as salmon, tuna, and trout, and in walnuts and flaxseed  Unhealthy fats:  Unhealthy fats can cause unhealthy cholesterol levels in your blood and increase your risk of heart disease  Limit unhealthy fats, such as the following:  · Cholesterol  is found in animal foods, such as eggs and lobster, and in dairy products made from whole milk  Limit cholesterol to less than 300 milligrams (mg) each day  You may need to limit cholesterol to 200 mg each day if you have heart disease  · Saturated fat  is found in meats, such as knox and hamburger  It is also found in chicken or turkey skin, whole milk, and butter  Limit saturated fat to less than 7% of your total daily calories  Limit saturated fat to less than 6% if you have heart disease or are at increased risk for it  · Trans fat  is found in packaged foods, such as potato chips and cookies  It is also in hard margarine, some fried foods, and shortening  Avoid trans fats as much as possible    Heart healthy foods and drinks to include:  Ask your dietitian or healthcare provider how many servings to have from each of the following food groups:  · Grains:      ¨ Whole-wheat breads, cereals, and pastas, and brown rice    ¨ Low-fat, low-sodium crackers and chips    · Vegetables:      ¨ Broccoli, green beans, green peas, and spinach    ¨ Collards, kale, and lima beans    ¨ Carrots, sweet potatoes, tomatoes, and peppers    ¨ Canned vegetables with no salt added    · Fruits:      ¨ Bananas, peaches, pears, and pineapple    ¨ Grapes, raisins, and dates    ¨ Oranges, tangerines, grapefruit, orange juice, and grapefruit juice    ¨ Apricots, mangoes, melons, and papaya    ¨ Raspberries and strawberries    ¨ Canned fruit with no added sugar    · Low-fat dairy products:      ¨ Nonfat (skim) milk, 1% milk, and low-fat almond, cashew, or soy milks fortified with calcium    ¨ Low-fat cheese, regular or frozen yogurt, and cottage cheese    · Meats and proteins , such as lean cuts of beef and pork (loin, leg, round), skinless chicken and turkey, legumes, soy products, egg whites, and nuts  Foods and drinks to limit or avoid:  Ask your dietitian or healthcare provider about these and other foods that are high in unhealthy fat, sodium, and sugar:  · Snack or packaged foods , such as frozen dinners, cookies, macaroni and cheese, and cereals with more than 300 mg of sodium per serving    · Canned or dry mixes  for cakes, soups, sauces, or gravies    · Vegetables with added sodium , such as instant potatoes, vegetables with added sauces, or regular canned vegetables    · Other foods high in sodium , such as ketchup, barbecue sauce, salad dressing, pickles, olives, soy sauce, and miso    · High-fat dairy foods  such as whole or 2% milk, cream cheese, or sour cream, and cheeses     · High-fat protein foods  such as high-fat cuts of beef (T-bone steaks, ribs), chicken or turkey with skin, and organ meats, such as liver    · Cured or smoked meats , such as hot dogs, knox, and sausage    · Unhealthy fats and oils , such as butter, stick margarine, shortening, and cooking oils such as coconut or palm oil    · Food and drinks high in sugar , such as soft drinks (soda), sports drinks, sweetened tea, candy, cake, cookies, pies, and doughnuts  Other diet guidelines to follow:   · Eat more foods containing omega-3 fats  Eat fish high in omega-3 fats at least 2 times a week  · Limit alcohol  Too much alcohol can damage your heart and raise your blood pressure  Women should limit alcohol to 1 drink a day  Men should limit alcohol to 2 drinks a day  A drink of alcohol is 12 ounces of beer, 5 ounces of wine, or 1½ ounces of liquor  · Choose low-sodium foods  High-sodium foods can lead to high blood pressure  Add little or no salt to food you prepare  Use herbs and spices in place of salt      · Eat more fiber  to help lower cholesterol levels  Eat at least 5 servings of fruits and vegetables each day  Eat 3 ounces of whole-grain foods each day  Legumes (beans) are also a good source of fiber  Lifestyle guidelines:   · Do not smoke  Nicotine and other chemicals in cigarettes and cigars can cause lung and heart damage  Ask your healthcare provider for information if you currently smoke and need help to quit  E-cigarettes or smokeless tobacco still contain nicotine  Talk to your healthcare provider before you use these products  · Exercise regularly  to help you maintain a healthy weight and improve your blood pressure and cholesterol levels  Ask your healthcare provider about the best exercise plan for you  Do not start an exercise program without asking your healthcare provider  Follow up with your healthcare provider as directed:  Write down your questions so you remember to ask them during your visits  © 2017 [employer]  Information is for End User's use only and may not be sold, redistributed or otherwise used for commercial purposes  All illustrations and images included in CareNotes® are the copyrighted property of [employer]  or [employer]  The above information is an  only  It is not intended as medical advice for individual conditions or treatments  Talk to your doctor, nurse or pharmacist before following any medical regimen to see if it is safe and effective for you  Calorie Counting Diet   WHAT YOU NEED TO KNOW:   What is a calorie counting diet? It is a meal plan based on counting calories each day to reach a healthy body weight  You will need to eat fewer calories if you are trying to lose weight  Weight loss may decrease your risk for certain health problems or improve your health if you have health problems  Some of these health problems include heart disease, high blood pressure, and diabetes  What foods should I avoid?   Your dietitian will tell you if you need to avoid certain foods based on your body weight and health condition  You may need to avoid high-fat foods if you are at risk for or have heart disease  You may need to eat fewer foods from the breads and starches food group if you have diabetes  How many calories are in foods? The following is a list of foods and drinks with the approximate number of calories in each  Check the food label to find the exact number of calories  A dietitian can tell you how many calories you should have from each food group each day    · Carbohydrate:      ¨ ½ of a 3-inch bagel, 1 slice of bread, or ½ of a hamburger bun or hot dog bun (80)    ¨ 1 (8-inch) flour tortilla or ½ cup of cooked rice (100)    ¨ 1 (6-inch) corn tortilla (80)    ¨ 1 (6-inch) pancake or 1 cup of bran flakes cereal (110)    ¨ ½ cup of cooked cereal (80)    ¨ ½ cup of cooked pasta (85)    ¨ 1 ounce of pretzels (100)    ¨ 3 cups of air-popped popcorn without butter or oil (80)    · Dairy:      ¨ 1 cup of skim or 1% milk (90)    ¨ 1 cup of 2% milk (120)    ¨ 1 cup of whole milk (160)    ¨ 1 cup of 2% chocolate milk (220)    ¨ 1 ounce of low-fat cheese with 3 grams of fat per ounce (70)    ¨ 1 ounce of cheddar cheese (114)    ¨ ½ cup of 1% fat cottage cheese (80)    ¨ 1 cup of plain or sugar-free, fat-free yogurt (90)    · Protein foods:      ¨ 3 ounces of fish (not breaded or fried) (95)    ¨ 3 ounces of breaded, fried fish (195)    ¨ ¾ cup of tuna canned in water (105)    ¨ 3 ounces of chicken breast without skin (105)    ¨ 1 fried chicken breast with skin (350)    ¨ ¼ cup of fat free egg substitute (40)    ¨ 1 large egg (75)    ¨ 3 ounces of lean beef or pork (165)    ¨ 3 ounces of fried pork chop or ham (185)    ¨ ½ cup of cooked dried beans, such as kidney, thompson, lentils, or navy (115)    ¨ 3 ounces of bologna or lunch meat (225)    ¨ 2 links of breakfast sausage (140)    · Vegetables:      ¨ ½ cup of sliced mushrooms (10)    ¨ 1 cup of salad greens, such as lettuce, spinach, or kevin (15)    ¨ ½ cup of steamed asparagus (20)    ¨ ½ cup of cooked summer squash, zucchini squash, or green or wax beans (25)    ¨ 1 cup of broccoli or cauliflower florets, or 1 medium tomato (25)    ¨ 1 large raw carrot or ½ cup of cooked carrots (40)    ¨ ? of a medium cucumber or 1 stalk of celery (5)    ¨ 1 small baked potato (160)    ¨ 1 cup of breaded, fried vegetables (230)    · Fruit:      ¨ 1 (6-inch) banana (55)     ¨ ½ of a 4-inch grapefruit (55)    ¨ 15 grapes (60)    ¨ 1 medium orange or apple (70)    ¨ 1 large peach (65)    ¨ 1 cup of fresh pineapple chunks (75)    ¨ 1 cup of melon cubes (50)    ¨ 1¼ cups of whole strawberries (45)    ¨ ½ cup of fruit canned in juice (55)    ¨ ½ cup of fruit canned in heavy syrup (110)    ¨ ?  cup of raisins (130)    ¨ ½ cup of unsweetened fruit juice (60)    ¨ ½ cup of grape, cranberry, or prune juice (90)    · Fat:      ¨ 10 peanuts or 2 teaspoons of peanut butter (55)    ¨ 2 tablespoons of avocado or 1 tablespoon of regular salad dressing (45)    ¨ 2 slices of knox (90)    ¨ 1 teaspoon of oil, such as safflower, canola, corn, or olive oil (45)    ¨ 2 teaspoons of low-fat margarine, or 1 tablespoon of low-fat mayonnaise (50)    ¨ 1 teaspoon of regular margarine (40)    ¨ 1 tablespoon of regular mayonnaise (135)    ¨ 1 tablespoon of cream cheese or 2 tablespoons of low-fat cream cheese (45)    ¨ 2 tablespoons of vegetable shortening (215)    · Dessert and sweets:      ¨ 8 animal crackers or 5 vanilla wafers (80)    ¨ 1 frozen fruit juice bar (80)    ¨ ½ cup of ice milk or low-fat frozen yogurt (90)    ¨ ½ cup of sherbet or sorbet (125)    ¨ ½ cup of sugar-free pudding or custard (60)    ¨ ½ cup of ice cream (140)    ¨ ½ cup of pudding or custard (175)    ¨ 1 (2-inch) square chocolate brownie (185)    · Combination foods:      ¨ Bean burrito made with an 8-inch tortilla, without cheese (275)    ¨ Chicken breast sandwich with lettuce and tomato (325)    ¨ 1 cup of chicken noodle soup (60)    ¨ 1 beef taco (175)    ¨ Regular hamburger with lettuce and tomato (310)    ¨ Regular cheeseburger with lettuce and tomato (410)     ¨ ¼ of a 12-inch cheese pizza (280)    ¨ Fried fish sandwich with lettuce and tomato (425)    ¨ Hot dog and bun (275)    ¨ 1½ cups of macaroni and cheese (310)    ¨ Taco salad with a fried tortilla shell (870)    · Low-calorie foods:      ¨ 1 tablespoon of ketchup or 1 tablespoon of fat free sour cream (15)    ¨ 1 teaspoon of mustard (5)    ¨ ¼ cup of salsa (20)    ¨ 1 large dill pickle (15)    ¨ 1 tablespoon of fat free salad dressing (10)    ¨ 2 teaspoons of low-sugar, light jam or jelly, or 1 tablespoon of sugar-free syrup (15)    ¨ 1 sugar-free popsicle (15)    ¨ 1 cup of club soda, seltzer water, or diet soda (0)  CARE AGREEMENT:   You have the right to help plan your care  Discuss treatment options with your caregivers to decide what care you want to receive  You always have the right to refuse treatment  The above information is an  only  It is not intended as medical advice for individual conditions or treatments  Talk to your doctor, nurse or pharmacist before following any medical regimen to see if it is safe and effective for you  © 2017 [address] [employer] Information is for End User's use only and may not be sold, redistributed or otherwise used for commercial purposes  All illustrations and images included in CareNotes® are the copyrighted property of [employer]  or [employer]  Please contact your insurance if you are uncertain of coverage for plan of care items  Your insurance may not cover the cost of your Vitamin D blood test, which is approximately $65-70  Please notify the lab prior to blood draw if you would like to decline this test       Start Magnesium oxide 200mg daily to prevent migraines

## 2019-07-22 NOTE — ASSESSMENT & PLAN NOTE
Uncontrolled  To consider migraine prophy in future as patient declines today  Start Compazine 10mg q8 hours PRN, restart Maxalt-MLT 10mg PRN  Start Magnesium oxide 200mg QD

## 2019-07-23 NOTE — PROGRESS NOTES
Assessment/Plan:      Diagnoses and all orders for this visit:    Chronic bilateral low back pain without sciatica  -     XR hip/pelv 2-3 vws left if performed; Future  -     Ambulatory referral to Physical Therapy; Future    Pain of both hip joints  -     Ambulatory referral to Physical Medicine Rehab  -     XR spine lumbar minimum 4 views non injury; Future  -     Ambulatory referral to Physical Therapy; Future    Lumbar back pain  -     Ambulatory referral to Physical Medicine Rehab  -     XR hip/pelv 2-3 vws left if performed; Future    Left hip pain  -     XR spine lumbar minimum 4 views non injury; Future  -     Ambulatory referral to Physical Therapy; Future      Discussion: 28 yo right hand dominant female presenting for chronic back and hip pain  Will order updated XRs and physical therapy  Discussed if no improvement will consider referral to orthopedics or pain management for possible injection therapy  She may continue OTC topicals and ibuprofen as needed for pain relief  Is to follow up with this office as needed  Patient agrees to above plan  Subjective:     Patient ID: Priscilla Odonnell is a 29 y o  female  HPI Referral from PCP for chronic low back and hip pain progressed over the past year without new trauma  L anterior hip pain > low back pain at present  Describes as a mild dull pain in back worse on the right side  Occasional radiation into bilateral buttocks but no LE sx,or N/T  No gross weakness but finds left leg will fatigue more quickly than right  No B/B changes or saddle anesthesia  Hip pain described as a constant dull ache  Pain is a 3-7/10 on pain scale  Pain increases with activity level and improves with rest  Is a  and very active  Does perform home graston technique with some relief  Scheduled for physical therapy for left rotator cuff  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 and injury during soccer game with known herniated discs  Admits to joint hypermobility  Previous ESIs as a teenager  No recent treatment for these issues  Denies any joint redness swelling  No recent imaging  Tiger balm and ibuprofen as needed   Considering CBD cream        PAST MEDICAL HISTORY  Past Medical History:   Diagnosis Date    Dysmenorrhea     Iron deficiency anemia     Lumbar herniated disc     s/p LAINEY    Migraines     Ovarian cyst     On OCP per Gyn    Overweight      PAST SURGICAL HISTORY  Past Surgical History:   Procedure Laterality Date    WISDOM TOOTH EXTRACTION         FAMILY HISTORY  Family History   Problem Relation Age of Onset    No Known Problems Mother     Hypertension Father     Breast cancer Maternal Grandmother     Rheum arthritis Maternal Grandmother     Heart disease Maternal Grandfather     Diabetes Maternal Grandfather     Stroke Maternal Grandfather     Lung cancer Paternal Grandmother     Scoliosis Sister     Endometriosis Sister     Ovarian cysts Sister     Depression Brother     Anxiety disorder Brother      HOME MEDICATIONS  Current Outpatient Medications   Medication Sig Dispense Refill    b complex vitamins capsule Take 1 capsule by mouth daily      ferrous sulfate 325 (65 Fe) mg tablet Take 325 mg by mouth daily with breakfast      ibuprofen (MOTRIN) 600 mg tablet Take 600 mg by mouth every 8 (eight) hours as needed for moderate pain       Multiple Vitamin (MULTIVITAMINS PO) Take 1 tablet by mouth daily       Multiple Vitamins-Minerals (HAIR SKIN & NAILS ADVANCED PO) Take 1 tablet by mouth daily      Norethin-Eth Estrad-Fe Biphas (LO LOESTRIN FE) 1 MG-10 MCG / 10 MCG TABS Take 1 tablet by mouth daily Rx per Gyn      prochlorperazine (COMPAZINE) 10 mg tablet Take 1 tablet (10 mg total) by mouth every 8 (eight) hours as needed for nausea or vomiting (Migraine) 30 tablet 0    rizatriptan (MAXALT-MLT) 10 MG disintegrating tablet Take 1 tablet (10 mg total) by mouth once as needed for migraine (May repeat once 2 hours later if migraine symptoms persist   Max dose 30mg/24 hours ) for up to 1 dose May repeat in 2 hours if needed 9 tablet 0     No current facility-administered medications for this visit  ALLERGIES  Patient has no known allergies  SOCIAL HISTORY  Social History     Substance and Sexual Activity   Alcohol Use Yes    Alcohol/week: 1 0 standard drinks    Types: 1 Shots of liquor per week    Frequency: 2-4 times a month    Drinks per session: 1 or 2    Binge frequency: Never     Social History     Substance and Sexual Activity   Drug Use Never     Social History     Tobacco Use   Smoking Status Never Smoker   Smokeless Tobacco Never Used       Review of Systems   Constitutional: Negative for diaphoresis, fatigue and unexpected weight change  Respiratory: Negative for shortness of breath  Cardiovascular: Negative for chest pain  Gastrointestinal: Negative for constipation and diarrhea  Genitourinary: Negative for decreased urine volume, difficulty urinating and urgency  Musculoskeletal: Positive for arthralgias, back pain and neck stiffness  Skin: Negative for color change, pallor and rash  Neurological: Positive for weakness and headaches  Negative for numbness  Psychiatric/Behavioral: Positive for sleep disturbance (insomnia )           Objective:  Vitals:    07/25/19 1402   BP: 112/72   Pulse: 88         Labs:  Lab on 07/22/2019   Component Date Value Ref Range Status    WBC 07/22/2019 4 09* 4 31 - 10 16 Thousand/uL Final    RBC 07/22/2019 4 42  3 81 - 5 12 Million/uL Final    Hemoglobin 07/22/2019 13 4  11 5 - 15 4 g/dL Final    Hematocrit 07/22/2019 41 8  34 8 - 46 1 % Final    MCV 07/22/2019 95  82 - 98 fL Final    MCH 07/22/2019 30 3  26 8 - 34 3 pg Final    MCHC 07/22/2019 32 1  31 4 - 37 4 g/dL Final    RDW 07/22/2019 13 1  11 6 - 15 1 % Final    MPV 07/22/2019 9 1  8 9 - 12 7 fL Final    Platelets 06/38/8704 262  149 - 390 Thousands/uL Final    nRBC 07/22/2019 0  /100 WBCs Final    Neutrophils Relative 07/22/2019 51  43 - 75 % Final    Immat GRANS % 07/22/2019 0  0 - 2 % Final    Lymphocytes Relative 07/22/2019 37  14 - 44 % Final    Monocytes Relative 07/22/2019 10  4 - 12 % Final    Eosinophils Relative 07/22/2019 1  0 - 6 % Final    Basophils Relative 07/22/2019 1  0 - 1 % Final    Neutrophils Absolute 07/22/2019 2 08  1 85 - 7 62 Thousands/µL Final    Immature Grans Absolute 07/22/2019 0 01  0 00 - 0 20 Thousand/uL Final    Lymphocytes Absolute 07/22/2019 1 52  0 60 - 4 47 Thousands/µL Final    Monocytes Absolute 07/22/2019 0 41  0 17 - 1 22 Thousand/µL Final    Eosinophils Absolute 07/22/2019 0 03  0 00 - 0 61 Thousand/µL Final    Basophils Absolute 07/22/2019 0 04  0 00 - 0 10 Thousands/µL Final    Sodium 07/22/2019 140  136 - 145 mmol/L Final    Potassium 07/22/2019 4 5  3 5 - 5 3 mmol/L Final    Chloride 07/22/2019 105  100 - 108 mmol/L Final    CO2 07/22/2019 28  21 - 32 mmol/L Final    ANION GAP 07/22/2019 7  4 - 13 mmol/L Final    BUN 07/22/2019 14  5 - 25 mg/dL Final    Creatinine 07/22/2019 0 86  0 60 - 1 30 mg/dL Final    Standardized to IDMS reference method    Glucose, Fasting 07/22/2019 91  65 - 99 mg/dL Final      Specimen collection should occur prior to Sulfasalazine administration due to the potential for falsely depressed results  Specimen collection should occur prior to Sulfapyridine administration due to the potential for falsely elevated results   Calcium 07/22/2019 8 6  8 3 - 10 1 mg/dL Final    AST 07/22/2019 16  5 - 45 U/L Final      Specimen collection should occur prior to Sulfasalazine administration due to the potential for falsely depressed results   ALT 07/22/2019 23  12 - 78 U/L Final      Specimen collection should occur prior to Sulfasalazine administration due to the potential for falsely depressed results       Alkaline Phosphatase 07/22/2019 59  46 - 116 U/L Final    Total Protein 07/22/2019 7 2  6 4 - 8 2 g/dL Final    Albumin 07/22/2019 3 6  3 5 - 5 0 g/dL Final    Total Bilirubin 07/22/2019 0 30  0 20 - 1 00 mg/dL Final    eGFR 07/22/2019 92  ml/min/1 73sq m Final    Cholesterol 07/22/2019 166  50 - 200 mg/dL Final      Cholesterol:       Desirable         <200 mg/dl       Borderline         200-239 mg/dl       High              >239           Triglycerides 07/22/2019 34  <=150 mg/dL Final      Triglyceride:     Normal          <150 mg/dl     Borderline High 150-199 mg/dl     High            200-499 mg/dl        Very High       >499 mg/dl    Specimen collection should occur prior to N-Acetylcysteine or Metamizole administration due to the potential for falsely depressed results   HDL, Direct 07/22/2019 78* 40 - 60 mg/dL Final      HDL Cholesterol:       High    >60 mg/dL       Low     <41 mg/dL  Specimen collection should occur prior to Metamizole administration due to the potential for falsley depressed results   LDL Calculated 07/22/2019 81  0 - 100 mg/dL Final      LDL Cholesterol:     Optimal           <100 mg/dl     Near Optimal      100-129 mg/dl     Above Optimal       Borderline High 130-159 mg/dl       High            160-189 mg/dl       Very High       >189 mg/dl         This screening LDL is a calculated result  It does not have the accuracy of the Direct Measured LDL in the monitoring of patients with hyperlipidemia and/or statin therapy  Direct Measure LDL (MME182) must be ordered separately in these patients   Non-HDL-Chol (CHOL-HDL) 07/22/2019 88  mg/dl Final    TSH 3RD GENERATON 07/22/2019 2 664  0 358 - 3 740 uIU/mL Final      The recommended reference ranges for TSH during pregnancy are as follows:   First trimester 0 1 to 2 5 uIU/mL   Second trimester  0 2 to 3 0 uIU/mL   Third trimester 0 3 to 3 0 uIU/m    Note: Normal ranges may not apply to patients who are transgender, non-binary, or whose legal sex, sex at birth, and gender identity differ    Using supplements with high doses of biotin 20 to more than 300 times greater than the adequate daily intake for adults of 30 mcg/day as established by the [place] [employer], can cause falsely depress results   Hemoglobin A1C 07/22/2019 4 8  4 2 - 6 3 % Final    EAG 07/22/2019 91  mg/dl Final    Vit D, 25-Hydroxy 07/22/2019 35 2  30 0 - 100 0 ng/mL Final    Magnesium 07/22/2019 2 1  1 6 - 2 6 mg/dL Final    Retic Ct Abs 07/22/2019 48,200  59,601-96,844 Final    Retic Ct Pct 07/22/2019 1 09  0 37 - 1 87 % Final    TIBC 07/22/2019 483* 250 - 450 ug/dL Final    Ferritin 07/22/2019 12  8 - 388 ng/mL Final    Iron 07/22/2019 147  50 - 170 ug/dL Final      Patients treated with metal-binding drugs (ie  Deferoxamine) may have depressed iron values  Physical Exam   Constitutional: She is oriented to person, place, and time  She appears well-developed and well-nourished  No distress  HENT:   Head: Normocephalic and atraumatic  Eyes: Conjunctivae and EOM are normal    Neck: Normal range of motion  Neck supple  Musculoskeletal:        Left hip: She exhibits tenderness  She exhibits no bony tenderness, no swelling and no deformity  Lumbar back: She exhibits decreased range of motion, tenderness and bony tenderness  TTP lumbar facet margins right L4-5, left > right SIJ    + left FADIR to anterior hip        Neurological: She is alert and oriented to person, place, and time  She has normal strength  She displays no atrophy  No cranial nerve deficit or sensory deficit  She exhibits normal muscle tone  Coordination and gait normal    Reflex Scores:       Tricep reflexes are 2+ on the right side and 2+ on the left side  Bicep reflexes are 2+ on the right side and 2+ on the left side  Brachioradialis reflexes are 2+ on the right side and 2+ on the left side  Patellar reflexes are 2+ on the right side and 1+ on the left side         Achilles reflexes are 2+ on the right side and 2+ on the left side   - SLR   Able to heel stand, toe stand, squat and rise    Skin: Skin is warm and dry  Capillary refill takes less than 2 seconds  No rash noted  She is not diaphoretic  No erythema  Psychiatric: She has a normal mood and affect  Her speech is normal and behavior is normal  Judgment and thought content normal  She is attentive  Vitals reviewed

## 2019-07-25 ENCOUNTER — HOSPITAL ENCOUNTER (OUTPATIENT)
Dept: RADIOLOGY | Facility: HOSPITAL | Age: 29
Discharge: HOME | End: 2019-07-25
Payer: COMMERCIAL

## 2019-07-25 ENCOUNTER — TELEPHONE (OUTPATIENT)
Dept: FAMILY MEDICINE CLINIC | Facility: CLINIC | Age: 29
End: 2019-07-25

## 2019-07-25 ENCOUNTER — OFFICE VISIT (OUTPATIENT)
Dept: PAIN MEDICINE | Facility: CLINIC | Age: 29
End: 2019-07-25
Payer: COMMERCIAL

## 2019-07-25 VITALS
WEIGHT: 163 LBS | DIASTOLIC BLOOD PRESSURE: 72 MMHG | HEART RATE: 88 BPM | BODY MASS INDEX: 25.58 KG/M2 | HEIGHT: 67 IN | SYSTOLIC BLOOD PRESSURE: 112 MMHG

## 2019-07-25 DIAGNOSIS — G89.29 CHRONIC BILATERAL LOW BACK PAIN WITHOUT SCIATICA: Primary | ICD-10-CM

## 2019-07-25 DIAGNOSIS — M25.552 PAIN OF BOTH HIP JOINTS: ICD-10-CM

## 2019-07-25 DIAGNOSIS — M25.552 LEFT HIP PAIN: ICD-10-CM

## 2019-07-25 DIAGNOSIS — G89.29 CHRONIC BILATERAL LOW BACK PAIN WITHOUT SCIATICA: ICD-10-CM

## 2019-07-25 DIAGNOSIS — M54.50 LUMBAR BACK PAIN: ICD-10-CM

## 2019-07-25 DIAGNOSIS — M25.551 PAIN OF BOTH HIP JOINTS: ICD-10-CM

## 2019-07-25 DIAGNOSIS — M54.50 CHRONIC BILATERAL LOW BACK PAIN WITHOUT SCIATICA: Primary | ICD-10-CM

## 2019-07-25 DIAGNOSIS — M54.50 CHRONIC BILATERAL LOW BACK PAIN WITHOUT SCIATICA: ICD-10-CM

## 2019-07-25 PROCEDURE — 73502 X-RAY EXAM HIP UNI 2-3 VIEWS: CPT

## 2019-07-25 PROCEDURE — 99203 OFFICE O/P NEW LOW 30 MIN: CPT | Performed by: PHYSICIAN ASSISTANT

## 2019-07-25 PROCEDURE — 72110 X-RAY EXAM L-2 SPINE 4/>VWS: CPT

## 2019-07-25 NOTE — TELEPHONE ENCOUNTER
Placed call to patient and left detailed message (okay per communication sheet) informing patient that lab work was normal and if she has any questions or concerns she can return our call

## 2019-07-30 ENCOUNTER — TELEPHONE (OUTPATIENT)
Dept: FAMILY MEDICINE CLINIC | Facility: CLINIC | Age: 29
End: 2019-07-30

## 2019-07-30 NOTE — TELEPHONE ENCOUNTER
Patient called back, she has no idea when/where she received her injection  If it was at her GYN she has been seeing the GYN office on [facility] since she has been going to a GYN other than that she has no idea where she would have received it  I called over to [facility] and they have no record of her having it, or giving it to the patient at any of her office visits    Patient states she has no idea where/when it would have been given then

## 2019-08-01 ENCOUNTER — EVALUATION (OUTPATIENT)
Dept: PHYSICAL THERAPY | Facility: CLINIC | Age: 29
End: 2019-08-01
Payer: COMMERCIAL

## 2019-08-01 DIAGNOSIS — M54.50 CHRONIC BILATERAL LOW BACK PAIN WITHOUT SCIATICA: ICD-10-CM

## 2019-08-01 DIAGNOSIS — G89.29 CHRONIC BILATERAL LOW BACK PAIN WITHOUT SCIATICA: ICD-10-CM

## 2019-08-01 DIAGNOSIS — M25.552 LEFT HIP PAIN: Primary | ICD-10-CM

## 2019-08-01 DIAGNOSIS — M25.511 ACUTE PAIN OF RIGHT SHOULDER: ICD-10-CM

## 2019-08-01 PROCEDURE — 97112 NEUROMUSCULAR REEDUCATION: CPT | Performed by: PHYSICAL THERAPIST

## 2019-08-01 NOTE — PROGRESS NOTES
PT Evaluation     Today's date: 2019  Patient name: Crispin Szymanski  : 1990  MRN: [id number]  Referring provider: Juan Alberto Boykin DO  Dx:   Encounter Diagnosis     ICD-10-CM    1  Left hip pain M25 552    2  Chronic bilateral low back pain without sciatica M54 5     G89 29    3  Acute pain of right shoulder M25 511                   Assessment  Assessment details: Pt presents with s/s consistent with chronic lumbar instability, L WILIAN, and a R RTC strain with possible labrum involvement  She will benefit from skilled PT services to address her impairments in order to decrease pain, restore function, and gradually return to strength training at the gym with appropriate technique, exercise selection, and intensity guidelines  Understanding of Dx/Px/POC: good   Prognosis: fair    Goals  STG - 4 wks  1) pt will be I with HEP  2) pt will demonstrate full R shoulder AROM  3) pt will demonstrate iso bridge > 30 sec  4) pt will improve R shoulder and L hip pain > 50% and LBP > 25%    LTG - 12 wks  1) pt will demonstrate iso bridge > 3 min  2) pt will demonstrate full, pain-free L hip PROM  3) pt will demonstrate > 4/5 L glute strength  4) pt will resolve R shoulder pain  5) pt will improve L hip pain > 90% and LBP > 75%  6) pt will begin to resume strengthening program at the gym    Plan  Other planned modality interventions: high-level laser  Planned therapy interventions: abdominal trunk stabilization, manual therapy, joint mobilization, patient education, neuromuscular re-education, body mechanics training, postural training, strengthening, stretching, therapeutic activities, therapeutic exercise, functional ROM exercises, home exercise program and coordination  Frequency: 2x week  Duration in weeks: 12  Treatment plan discussed with: patient        Subjective Evaluation    History of Present Illness  Mechanism of injury: Pt is a 29 y o  female with PMHx significant for migraines, anemia    She reports CLBP with R>L buttock pain > 10 yrs and L anterior hip and groin pain x 1 yr  She also reports gradual onset of R anterior shoulder pain 2 days after completing a [employer] race 2 wks ago  She denies B UE and LE radicular symptoms  She reports 8/10 R shoulder pain with sudden movements, 0-4/10 at rest depending on activity levels  She reports 3/10 LB and L hip pain at best with sitting, 8/10 at worst with squatting, deadlifts, running  She reports (+) clicking and grinding with L hip ROM      Pain  Current pain ratin  At best pain rating: 3  At worst pain ratin  Progression: no change    Hand dominance: right      Diagnostic Tests  X-ray: normal (lumbar spine, L hip)  Treatments  No previous or current treatments  Patient Goals  Patient goals for therapy: decreased pain, increased motion and return to sport/leisure activities          Objective     Active Range of Motion     Right Shoulder   Flexion: 145 degrees with pain  Abduction: 125 degrees with pain  External rotation BTH: T2   Internal rotation BTB: T4     Lumbar   Flexion: 120 degrees  with pain  Extension: 15 degrees  with pain  Left lateral flexion:  with pain Restriction level: minimal  Right lateral flexion:  WFL and with pain  Left rotation:  WFL  Right rotation:  [facility]    Passive Range of Motion     Right Shoulder   Flexion: 160 degrees with pain  Abduction: 140 degrees with pain  External rotation 90°: 130 degrees   Internal rotation 90°: 65 degrees with pain  Left Hip   Flexion: 130 degrees with pain  Extension: 25 degrees   Abduction: 45 degrees   Adduction: 35 degrees   External rotation (prone): 40 degrees   Internal rotation (prone): 55 degrees     Right Hip   Flexion: 140 degrees   Extension: 25 degrees   Abduction: 48 degrees   Adduction: 40 degrees   External rotation (prone): 50 degrees   Internal rotation (prone): 45 degrees     Scapular Mobility     Right Shoulder   Scapular Dyskinesis: grade I  Scapular Mobility with Shoulder to 90° FF Scapular winging: minimal  Scapular elevation: minimal  Upward rotation: inadequate    Joint Play   Joints within functional limits: S1     Hypermobile: L1, L2, L3, L4 and L5     Pain: L5     Strength/Myotome Testing     Right Shoulder     Planes of Motion   Flexion: 4   Abduction: 4-   External rotation at 0°: 4-   Internal rotation at 0°: 4     Left Hip   Planes of Motion   Flexion: 3+  Extension: 4-  Abduction: 3+  Adduction: 3+  External rotation: 3  Internal rotation: 4-    Right Hip   Planes of Motion   Flexion: 4-  Extension: 4  Abduction: 4-  Adduction: 4  External rotation: 3+  Internal rotation: 4-    Muscle Activation     Additional Muscle Activation Details  Pt unable to perform bridge without pain    Tests     Right Shoulder   Positive active compression ([employer])  Negative external rotation lag sign, Hawkin's, lift-off, Neer's, painful arc, Speed's and Yergason's  Lumbar     Left   Negative passive SLR (100 deg)  Right   Negative passive SLR (105 deg)  Left Hip   Positive MAC and FADIR  Precautions:  PMHx: migraines, anemia  Dx: chronic lumbar instability, L WILIAN, R RTC strain with possible small labral lesion    Manual  8/1            Laser R shoulder             Prone L5/S1 EXT MWM             L hip FLEX, IR MWM                                           Exercise Diary  8/1            R sh TB ER YTB  2x15            Wall slides: FF             B sh TB horizontal ABD             Abdominal bracing 5"x10            DLS steffi 7G14            bridges             Quad alt LE             zeny YTB  2x15                                                                                                                                                                            Modalities

## 2019-08-02 PROCEDURE — 97162 PT EVAL MOD COMPLEX 30 MIN: CPT | Performed by: PHYSICAL THERAPIST

## 2019-08-05 ENCOUNTER — OFFICE VISIT (OUTPATIENT)
Dept: PHYSICAL THERAPY | Facility: CLINIC | Age: 29
End: 2019-08-05
Payer: COMMERCIAL

## 2019-08-05 DIAGNOSIS — M54.50 CHRONIC BILATERAL LOW BACK PAIN WITHOUT SCIATICA: ICD-10-CM

## 2019-08-05 DIAGNOSIS — M25.552 LEFT HIP PAIN: Primary | ICD-10-CM

## 2019-08-05 DIAGNOSIS — M25.511 ACUTE PAIN OF RIGHT SHOULDER: ICD-10-CM

## 2019-08-05 DIAGNOSIS — G89.29 CHRONIC BILATERAL LOW BACK PAIN WITHOUT SCIATICA: ICD-10-CM

## 2019-08-05 PROCEDURE — 97112 NEUROMUSCULAR REEDUCATION: CPT | Performed by: PHYSICAL THERAPIST

## 2019-08-05 PROCEDURE — 97140 MANUAL THERAPY 1/> REGIONS: CPT | Performed by: PHYSICAL THERAPIST

## 2019-08-05 NOTE — PROGRESS NOTES
Daily Note     Today's date: 2019  Patient name: Alona De La Cruz  : 1990  MRN: [id number]  Referring provider: Alexander Alfred DO  Dx:   Encounter Diagnosis     ICD-10-CM    1  Left hip pain M25 552    2  Chronic bilateral low back pain without sciatica M54 5     G89 29    3  Acute pain of right shoulder M25 511                   Subjective: Pt reports good compliance with HEP  She reports 9/10 R LBP last night after long work hours over the weekend  She reports significant difficulty and increased pain with clamshells  Objective: See treatment diary below    Assessment: Pt demonstrated R UT TrP and poor L glute activation during clamshells without resistance  She demonstrated improved ROM following MWM but limited tolerance to reps  Plan: Assess response NV ,        Precautions:  PMHx: migraines, anemia  Dx: chronic lumbar instability, L WILIAN, R RTC strain with possible small labral lesion    Manual             [place] R UT  5 min           Prone L5/S1 EXT MWM  np           L hip FLEX MWM  1x10           Prone R innominate AP MWM  1x5                            Exercise Diary             R sh TB ER YTB  2x15 YTB  2x20 YTB  3x20          Wall slides: FF  2x15           B sh TB horizontal ABD  YTB  2x15           Abdominal bracing 5"x10 5"x20           DLS marches 5I00 4M45 1 5#/  3x20          bridges  Unable  to tolerate           Quad alt LE  15"x2           clamshells YTB  2x15 No TB  2x10                                                                                                                                                                           Modalities

## 2019-08-07 ENCOUNTER — OFFICE VISIT (OUTPATIENT)
Dept: PHYSICAL THERAPY | Facility: CLINIC | Age: 29
End: 2019-08-07
Payer: COMMERCIAL

## 2019-08-07 DIAGNOSIS — G89.29 CHRONIC BILATERAL LOW BACK PAIN WITHOUT SCIATICA: ICD-10-CM

## 2019-08-07 DIAGNOSIS — M25.511 ACUTE PAIN OF RIGHT SHOULDER: ICD-10-CM

## 2019-08-07 DIAGNOSIS — M54.50 CHRONIC BILATERAL LOW BACK PAIN WITHOUT SCIATICA: ICD-10-CM

## 2019-08-07 DIAGNOSIS — M25.552 LEFT HIP PAIN: Primary | ICD-10-CM

## 2019-08-07 PROCEDURE — 97110 THERAPEUTIC EXERCISES: CPT

## 2019-08-07 PROCEDURE — 97112 NEUROMUSCULAR REEDUCATION: CPT

## 2019-08-07 NOTE — PROGRESS NOTES
Daily Note     Today's date: 2019  Patient name: Mitch Arellano  : 1990  MRN: [id number]  Referring provider: Tona Gaona DO  Dx:   Encounter Diagnosis     ICD-10-CM    1  Left hip pain M25 552    2  Chronic bilateral low back pain without sciatica M54 5     G89 29    3  Acute pain of right shoulder M25 511                   Subjective: Pt reports moderate low back pain and mild right shoulder pain pre tx  Pt reports increased right shoulder pain following last tx session  Objective: See treatment diary below    Assessment: Pt demonstrated continued difficulty with L glut activation during clamshells, mild difficulty maintaining lumbar stabilization during supine marching  Pt demonstrated high R UT tone  Plan: Assess response NV  Precautions:  PMHx: migraines, anemia  Dx: chronic lumbar instability, L WILIAN, R RTC strain with possible small labral lesion    Manual            [place] R UT  5 min 5 min          Prone L5/S1 EXT MWM  np np          L hip FLEX MWM  1x10 np          Prone R innominate AP MWM  1x5 np                           Exercise Diary            R sh TB ER YTB  2x15 YTB  2x20 YTB  2x20          Wall slides: FF  2x15 2x15          B sh TB horizontal ABD  YTB  2x15 YTB  2x15          Abdominal bracing 5"x10 5"x20 5"x20          DLS marches 3V51 8G77 1 5#/  3x20          bridges  Unable  to tolerate np          Quad alt LE  15"x2 15"x3          clamshells YTB  2x15 No TB  2x10 No TB  2x10                                                                                                                                                                          Modalities

## 2019-08-12 ENCOUNTER — APPOINTMENT (OUTPATIENT)
Dept: PHYSICAL THERAPY | Facility: CLINIC | Age: 29
End: 2019-08-12
Payer: COMMERCIAL

## 2019-08-14 ENCOUNTER — APPOINTMENT (OUTPATIENT)
Dept: PHYSICAL THERAPY | Facility: CLINIC | Age: 29
End: 2019-08-14
Payer: COMMERCIAL

## 2019-08-14 ENCOUNTER — OFFICE VISIT (OUTPATIENT)
Dept: PHYSICAL THERAPY | Facility: CLINIC | Age: 29
End: 2019-08-14
Payer: COMMERCIAL

## 2019-08-14 DIAGNOSIS — M25.552 LEFT HIP PAIN: Primary | ICD-10-CM

## 2019-08-14 DIAGNOSIS — M25.511 ACUTE PAIN OF RIGHT SHOULDER: ICD-10-CM

## 2019-08-14 DIAGNOSIS — M54.50 CHRONIC BILATERAL LOW BACK PAIN WITHOUT SCIATICA: ICD-10-CM

## 2019-08-14 DIAGNOSIS — G89.29 CHRONIC BILATERAL LOW BACK PAIN WITHOUT SCIATICA: ICD-10-CM

## 2019-08-14 PROCEDURE — 97110 THERAPEUTIC EXERCISES: CPT | Performed by: PHYSICAL THERAPIST

## 2019-08-14 PROCEDURE — 97140 MANUAL THERAPY 1/> REGIONS: CPT | Performed by: PHYSICAL THERAPIST

## 2019-08-14 PROCEDURE — 97112 NEUROMUSCULAR REEDUCATION: CPT | Performed by: PHYSICAL THERAPIST

## 2019-08-14 NOTE — PROGRESS NOTES
Daily Note     Today's date: 2019  Patient name: Bigg Ervin  : 1990  MRN: [id number]  Referring provider: Leanna Ramachandran DO  Dx:   Encounter Diagnosis     ICD-10-CM    1  Left hip pain M25 552    2  Chronic bilateral low back pain without sciatica M54 5     G89 29    3  Acute pain of right shoulder M25 511                   Subjective: Pt reports 0/10 R shoulder pain currently and 4/10 at worst with elevation  She reports 8/10 LBP and L hip pain yesterday after working all day on her feet, 0/10 L hip and 5/10 LBP currently  Objective: See treatment diary below  No improvement in pain with SIJ belt    Assessment: Pt demonstrated improved L glute recruitment immediately following ITB foam roller, improved response to SIJ/lumbar MWM, no improvement with L hip MWM  Plan: Assess response to trial of bridges, self MWM, and HEP modifications  Precautions:  PMHx: migraines, anemia  Dx: chronic lumbar instability, L WILIAN, R RTC strain with possible small labral lesion    Manual           [place] R UT  5 min 5 min 5 min         Prone L4/5 EXT MWM  np np 2x6 L4/5, L5/S1        L hip FLEX MWM  1x10 np 1x15         Prone R innominate AP MWM  1x5 np 2x10         L ITB foam roller    4 min             Exercise Diary           R sh TB ER YTB  2x15 YTB  2x20 YTB  2x20 RTB  2x15         Wall slides: FF  2x15 2x15 3x12         B sh TB horizontal ABD  YTB  2x15 YTB  2x15 YTB  2x20         Abdominal bracing 5"x10 5"x20 5"x20 HEP         DLS marches 3U31 0O48 1 5#/  3x20 1 5#/  3x20         bridges  Unable  to tolerate np 2x10         Quad alt LE  15"x2 15"x3 NV         clamshells YTB  2x15 No TB  2x10 No TB  2x10 No TB  2x15                                                                                                                                                                         Modalities

## 2019-08-21 ENCOUNTER — APPOINTMENT (OUTPATIENT)
Dept: PHYSICAL THERAPY | Facility: CLINIC | Age: 29
End: 2019-08-21
Payer: COMMERCIAL

## 2019-09-24 ENCOUNTER — OFFICE VISIT (OUTPATIENT)
Dept: PAIN MEDICINE | Facility: CLINIC | Age: 29
End: 2019-09-24
Payer: COMMERCIAL

## 2019-09-24 VITALS
BODY MASS INDEX: 25.9 KG/M2 | WEIGHT: 165 LBS | DIASTOLIC BLOOD PRESSURE: 80 MMHG | SYSTOLIC BLOOD PRESSURE: 110 MMHG | HEIGHT: 67 IN | HEART RATE: 60 BPM

## 2019-09-24 DIAGNOSIS — M54.41 CHRONIC MIDLINE LOW BACK PAIN WITH BILATERAL SCIATICA: Primary | ICD-10-CM

## 2019-09-24 DIAGNOSIS — M25.552 PAIN OF LEFT HIP JOINT: ICD-10-CM

## 2019-09-24 DIAGNOSIS — M54.42 CHRONIC MIDLINE LOW BACK PAIN WITH BILATERAL SCIATICA: Primary | ICD-10-CM

## 2019-09-24 DIAGNOSIS — G89.29 CHRONIC MIDLINE LOW BACK PAIN WITH BILATERAL SCIATICA: Primary | ICD-10-CM

## 2019-09-24 PROCEDURE — 99214 OFFICE O/P EST MOD 30 MIN: CPT | Performed by: PHYSICIAN ASSISTANT

## 2019-09-24 RX ORDER — PREDNISONE 1 MG/1
TABLET ORAL
Qty: 21 TABLET | Refills: 0 | Status: SHIPPED | OUTPATIENT
Start: 2019-09-24 | End: 2019-10-30 | Stop reason: ALTCHOICE
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
## 2019-09-24 NOTE — PATIENT INSTRUCTIONS
Prednisone (By mouth)   Prednisone (PRED-ni-sone)  Treats many diseases and conditions, especially problems related to inflammation  This medicine is a corticosteroid  Brand Name(s): Contrast Allergy PreMed Pack, Mae, predniSONE Intensol   There may be other brand names for this medicine  When This Medicine Should Not Be Used: This medicine is not right for everyone  Do not use if you had an allergic reaction to prednisone or if you are pregnant  How to Use This Medicine:   Liquid, Tablet, Delayed Release Tablet  · Take your medicine as directed  Your dose may need to be changed several times to find what works best for you  · It is best to take this medicine with food or milk  · Swallow the delayed-release tablet whole  Do not crush, break, or chew it  · Measure the oral liquid medicine with a marked measuring spoon, oral syringe, or medicine cup  · Missed dose: Take a dose as soon as you remember  If it is almost time for your next dose, wait until then and take a regular dose  Do not take extra medicine to make up for a missed dose  · Store the medicine in a closed container at room temperature, away from heat, moisture, and direct light  Do not freeze the oral liquid  Drugs and Foods to Avoid:   Ask your doctor or pharmacist before using any other medicine, including over-the-counter medicines, vitamins, and herbal products  · Tell your doctor if you use any of the following:  ¨ Aminoglutethimide, amphotericin B, carbamazepine, cholestyramine, cyclosporine, digoxin, isoniazid, ketoconazole, phenobarbital, phenytoin, or rifampin  ¨ Blood thinner, such as warfarin  ¨ NSAID pain or arthritis medicine, such as aspirin, diclofenac, ibuprofen, naproxen, celecoxib  ¨ Diuretic (water pill)  ¨ Diabetes medicine  ¨ Macrolide antibiotic, such as azithromycin, clarithromycin, erythromycin  ¨ Estrogen, including birth control pills or hormone replacement therapy  · This medicine may interfere with vaccines  Ask your doctor before you get a flu shot or any other vaccines  Warnings While Using This Medicine:   · It is not safe to take this medicine during pregnancy  It could harm an unborn baby  Tell your doctor right away if you become pregnant  · Tell your doctor if you are breastfeeding or if you have kidney problems, heart failure, high blood pressure, a recent heart attack, diabetes, glaucoma, osteoporosis, or thyroid problems  Tell your doctor about any infection you have  Also tell your doctor if you have had mental or emotional problems (such as depression) or stomach or bowel problems (such as an ulcer or diverticulitis)  · This medicine may cause the following problems:  ¨ Mood or behavior changes  ¨ Higher blood pressure, retaining water, changes in salt or potassium levels in your body  ¨ Cataracts or glaucoma (with long-term use)  ¨ Weak bones or osteoporosis (with long-term use)  ¨ Slow growth in children (with long-term use)  ¨ Muscle problems (with high doses, especially if you have myasthenia gravis or similar nerve and muscle problems)  · Do not stop using this medicine suddenly  Your doctor will need to slowly decrease your dose before you stop it completely  · This medicine could cause you to get infections more easily  Tell your doctor right away if you are exposed to chicken pox, measles, or other serious infection  Tell your doctor if you had a serious infection in the past, such as tuberculosis or herpes  · Tell your doctor about any extra stress or anxiety in your life  Your dose might need to be changed for a short time  · Tell any doctor or dentist who treats you that you are using this medicine  This medicine may affect certain medical test results  · Keep all medicine out of the reach of children  Never share your medicine with anyone    Possible Side Effects While Using This Medicine:   Call your doctor right away if you notice any of these side effects:  · Allergic reaction: Itching or hives, swelling in your face or hands, swelling or tingling in your mouth or throat, chest tightness, trouble breathing  · Dark freckles, skin color changes, coldness, weakness, tiredness, nausea, vomiting, weight loss  · Depression, unusual thoughts, feelings, or behaviors, trouble sleeping  · Fever, chills, cough, sore throat, and body aches  · Muscle pain or weakness  · Rapid weight gain, swelling in your hands, ankles, or feet  · Severe stomach pain, nausea, vomiting, or red or black stools  · Skin changes or growths  · Trouble seeing, eye pain, headache  If you notice these less serious side effects, talk with your doctor:   · Increased appetite  · Round, puffy face  · Weight gain around your neck, upper back, breast, face, or waist  If you notice other side effects that you think are caused by this medicine, tell your doctor  Call your doctor for medical advice about side effects  You may report side effects to FDA at 8-231-FDA-5829  © 2017 [address] [employer] Information is for End User's use only and may not be sold, redistributed or otherwise used for commercial purposes  The above information is an  only  It is not intended as medical advice for individual conditions or treatments  Talk to your doctor, nurse or pharmacist before following any medical regimen to see if it is safe and effective for you

## 2019-10-05 ENCOUNTER — HOSPITAL ENCOUNTER (OUTPATIENT)
Dept: RADIOLOGY | Facility: HOSPITAL | Age: 29
Discharge: HOME | End: 2019-10-05
Payer: COMMERCIAL

## 2019-10-05 DIAGNOSIS — M54.41 CHRONIC MIDLINE LOW BACK PAIN WITH BILATERAL SCIATICA: ICD-10-CM

## 2019-10-05 DIAGNOSIS — G89.29 CHRONIC MIDLINE LOW BACK PAIN WITH BILATERAL SCIATICA: ICD-10-CM

## 2019-10-05 DIAGNOSIS — M54.42 CHRONIC MIDLINE LOW BACK PAIN WITH BILATERAL SCIATICA: ICD-10-CM

## 2019-10-05 PROCEDURE — 72148 MRI LUMBAR SPINE W/O DYE: CPT

## 2019-10-15 ENCOUNTER — CONSULT (OUTPATIENT)
Dept: PAIN MEDICINE | Facility: CLINIC | Age: 29
End: 2019-10-15
Payer: COMMERCIAL

## 2019-10-15 VITALS
WEIGHT: 171 LBS | BODY MASS INDEX: 26.84 KG/M2 | HEIGHT: 67 IN | DIASTOLIC BLOOD PRESSURE: 70 MMHG | HEART RATE: 62 BPM | RESPIRATION RATE: 16 BRPM | SYSTOLIC BLOOD PRESSURE: 122 MMHG

## 2019-10-15 DIAGNOSIS — M25.552 PAIN OF LEFT HIP JOINT: ICD-10-CM

## 2019-10-15 DIAGNOSIS — M54.42 CHRONIC MIDLINE LOW BACK PAIN WITH BILATERAL SCIATICA: ICD-10-CM

## 2019-10-15 DIAGNOSIS — M47.816 LUMBAR SPONDYLOSIS: Primary | ICD-10-CM

## 2019-10-15 DIAGNOSIS — G89.29 CHRONIC MIDLINE LOW BACK PAIN WITH BILATERAL SCIATICA: ICD-10-CM

## 2019-10-15 DIAGNOSIS — M54.41 CHRONIC MIDLINE LOW BACK PAIN WITH BILATERAL SCIATICA: ICD-10-CM

## 2019-10-15 PROCEDURE — 99204 OFFICE O/P NEW MOD 45 MIN: CPT | Performed by: PHYSICAL MEDICINE & REHABILITATION

## 2019-10-15 NOTE — PROGRESS NOTES
Assessment  1  Chronic midline low back pain with bilateral sciatica    2  Pain of left hip joint        Plan  Ms Harjeet Dobson is a pleasant 19-year-old  who has been dealing with low back pain since she was 15years old  She recently obtained insurance again and is now looking to address the low back pain  Over-the-counter NSAIDs and physical therapy plus home exercise regimen has provided minimal to no relief for her  Today she has both clinical and diagnostic evidence of lumbar spondylosis with evidence of lumbar radiculopathy down the right lower extremity  At this time the pain in her back is worse than her right lower extremity  As such   We will schedule the patient for right left-sided L3, L4, L5  medial branch nerve blocks with intention of moving forward towards radiofrequency ablation if there is an appropriate diagnostic response  The initial blocks will be performed with 0 25% bupivacaine and if an appropriate response is obtained upon review of the patient's pain diary, a confirmatory block will be scheduled with 0 75% bupivacaine  In the office today, we reviewed the nature of facet joint pathology in depth using a spine model  We discussed the approach we would use for the injections and provided literature for home review  The patient understands the risks associated with the procedure including bleeding, infection, tissue injury, and allergic reaction and provided verbal informed consent in the office today  My impressions and treatment recommendations were discussed in detail with the patient who verbalized understanding and had no further questions  Discharge instructions were provided  I personally saw and examined the patient and I agree with the above discussed plan of care      History of Present Illness    Ruthann Santacruz is a 29 y o  female  presents to [facility] with complaints of moderate to severe pain rated 6 to 7/10 and interfering with her daily activities  Pain is constant 100% of the time and worse in the morning and evening  She describes the pain as numbness, sharp, pins and needles, pressure-like, throbbing, dull and aching  She has most noticed the pain in her low back as well as her right posterior thigh  She states pain started back which was 15years old that she had an injection for  Pain improved however lost to follow-up secondary to insurance problems  She has now regained insurance and is seeking further re-evaluation of her low back pain  Pain today is worse with lying down, standing, bending, walking, relaxation  She has moderate relief from a TENS unit and manipulation over-the-counter Tylenol, ibuprofen have provided some relief  She does have an MRI of the lumbar spine with results below  Presents now for evaluation of chronic low back pain    I have personally reviewed and/or updated the patient's past medical history, past surgical history, family history, social history, current medications, allergies, and vital signs today  Review of Systems   Constitutional: Negative for fever and unexpected weight change  HENT: Negative for trouble swallowing  Eyes: Negative for visual disturbance  Respiratory: Negative for shortness of breath and wheezing  Cardiovascular: Negative for chest pain and palpitations  Gastrointestinal: Negative for constipation, diarrhea, nausea and vomiting  Endocrine: Negative for cold intolerance, heat intolerance and polydipsia  Genitourinary: Negative for difficulty urinating and frequency  Musculoskeletal: Positive for myalgias  Negative for arthralgias, gait problem and joint swelling  Joint Pain   Skin: Negative for rash  Neurological: Positive for dizziness and headaches  Negative for seizures, syncope and weakness  Hematological: Does not bruise/bleed easily  Psychiatric/Behavioral: Negative for dysphoric mood     All other systems reviewed and are negative  Patient Active Problem List   Diagnosis    Lumbar herniated disc    Overweight    Dysmenorrhea    Iron deficiency anemia    Migraines       Past Medical History:   Diagnosis Date    Dysmenorrhea     Iron deficiency anemia     Lumbar herniated disc     s/p LAINEY    Migraines     Ovarian cyst     On OCP per Gyn    Overweight        Past Surgical History:   Procedure Laterality Date    WISDOM TOOTH EXTRACTION         Family History   Problem Relation Age of Onset    No Known Problems Mother     Hypertension Father     Breast cancer Maternal Grandmother     Rheum arthritis Maternal Grandmother     Heart disease Maternal Grandfather     Diabetes Maternal Grandfather     Stroke Maternal Grandfather     Lung cancer Paternal Grandmother     Scoliosis Sister     Endometriosis Sister     Ovarian cysts Sister     Depression Brother     Anxiety disorder Brother        Social History     Occupational History    Occupation:      Comment: [address] in [address] [place] [address] [place] Use    Smoking status: Never Smoker    Smokeless tobacco: Never Used   Substance and Sexual Activity    Alcohol use:  Yes     Alcohol/week: 1 0 standard drinks     Types: 1 Shots of liquor per week     Frequency: 2-4 times a month     Drinks per session: 1 or 2     Binge frequency: Never    Drug use: Never    Sexual activity: Yes     Partners: Male     Birth control/protection: OCP       Current Outpatient Medications on File Prior to Visit   Medication Sig    b complex vitamins capsule Take 1 capsule by mouth daily    ferrous sulfate 325 (65 Fe) mg tablet Take 325 mg by mouth daily with breakfast    ibuprofen (MOTRIN) 600 mg tablet Take 600 mg by mouth every 8 (eight) hours as needed for moderate pain     Multiple Vitamin (MULTIVITAMINS PO) Take 1 tablet by mouth daily     Multiple Vitamins-Minerals (HAIR SKIN & NAILS ADVANCED PO) Take 1 tablet by mouth daily    Norethin-Eth Estrad-Fe Biphas (LO LOESTRIN FE) 1 MG-10 MCG / 10 MCG TABS Take 1 tablet by mouth daily Rx per Gyn    prochlorperazine (COMPAZINE) 10 mg tablet Take 1 tablet (10 mg total) by mouth every 8 (eight) hours as needed for nausea or vomiting (Migraine)    rizatriptan (MAXALT-MLT) 10 MG disintegrating tablet Take 1 tablet (10 mg total) by mouth once as needed for migraine (May repeat once 2 hours later if migraine symptoms persist   Max dose 30mg/24 hours ) for up to 1 dose May repeat in 2 hours if needed    predniSONE 5 mg tablet Take 6 tablets by mouth on day 1, 5 tablets day 2, 4 tablets day 3, 3 tablets day 4, 2 tablets day 5, and 1 tablet on day 6  (Patient not taking: Reported on 10/15/2019)     No current facility-administered medications on file prior to visit  No Known Allergies    Physical Exam    /70   Pulse 62   Resp 16   Ht 5' 7" (1 702 m)   Wt 77 6 kg (171 lb)   BMI 26 78 kg/m²     General: Well-developed, well-nourished individual in no acute distress  Mental: Appropriate mood and affect  Grossly oriented with coherent speech and thought processing  Neuro:  Cranial nerves: Cranial nerve function is grossly intact bilaterally  Strength: Bilateral lower extremity strength is normal and symmetric  No atrophy or tone abnormalities noted  Reflexes: Bilateral lower extremity muscle stretch reflexes are physiologic and symmetric  No ankle clonus is noted  Sensation: No loss of sensation is noted  SLR/Foraminal Compression Maneuvers:  Positive in the right lower extremity while supine for radicular pain  Gait:  Gait/gross motor: Gait is normal  Station is normal     Musculoskeletal:  Palpation: Inspection and palpation of the spine and extremities are remarkable for tenderness to palpation of the bilateral lumbar paraspinals as well as the bilateral PSIS  POSITIVE lumbar facet pain with axial loading and rotational force to the right and left      Spine:  Limited active and passive range of motion secondary to pain in flexion, extension, side bending left and right side as well as rotation  No gross axial skeletal deformities  Skin: Skin inspection grossly negative for erythema, breakdown, or concerning lesions in affected area  Lymph: No lymphadenopathy is appreciated in the involved extremity  Vessels: No lower extremity edema  Lungs: Breathing is comfortable and regular  No dyspnea noted during examination  Eyes: Visual field grossly intact to confrontation  No redness appreciated  ENT: No craniofacial deformities or asymmetry  No neck masses appreciated  ImagingLumbar MRI dated 10/5/19:L1-L2:  Normal      L2-L3:  Normal      L3-L4:  Normal      L4-L5:  Mild facet hypertrophy  No significant central or foraminal narrowing      L5-S1:  Broad-based central protrusion type disc herniation  Small marginal osteophytes are noted  No central or foraminal narrowing  Mild facet hypertrophy      IMPRESSION:     Small broad-based central protrusion type disc herniation L5-S1  No significant central or foraminal narrowing

## 2019-10-15 NOTE — PATIENT INSTRUCTIONS
Arthritis   AMBULATORY CARE:   Arthritis  is a disease that causes inflammation in one or more joints  There are many types of arthritis, such as osteoarthritis, rheumatoid arthritis, and septic arthritis  Some types cause inflammation in the joints  Other types wear away the cartilage between joints  This makes the bones of the joint rub together when you move the joint  Your symptoms may be constant, or symptoms may come and go  Arthritis often gets worse over time and can cause permanent joint damage  Common signs and symptoms of arthritis:   · Pain, swelling, or stiffness in the joint    · Limited range of motion in the joint    · Warmth or redness over the joint    · Tenderness when you touch the joint    · Stiff joints in the morning that loosen with movement    · A creaking or grinding sound when you move the joint    · Fever  Seek care immediately if:   · You have a fever and severe joint pain or swelling  · You cannot move the affected joint  · You have severe joint pain you cannot tolerate  Contact your healthcare provider if:   · Your pain or swelling does not get better with treatment  · You have questions or concerns about your condition or care  Treatment  will depend on the type of arthritis you have and if it is severe  You may need any of the following:  · Acetaminophen  decreases pain and fever  It is available without a doctor's order  Ask how much to take and how often to take it  Follow directions  Acetaminophen can cause liver damage if not taken correctly  · NSAIDs , such as ibuprofen, help decrease swelling, pain, and fever  This medicine is available with or without a doctor's order  NSAIDs can cause stomach bleeding or kidney problems in certain people  If you take blood thinner medicine, always ask your healthcare provider if NSAIDs are safe for you  Always read the medicine label and follow directions  · Steroid medicine  helps reduce swelling and pain       · Surgery may be needed to repair or replace a damaged joint  Manage arthritis:   · Rest your painful joint so it can heal   Your healthcare provider may recommend crutches or a walker if the affected joint is in a leg  · Apply ice or heat to the joint  Both can help decrease swelling and pain  Ice may also help prevent tissue damage  Use an ice pack, or put crushed ice in a plastic bag  Cover it with a towel and place it on your joint for 15 to 20 minutes every hour or as directed  You can apply heat for 20 minutes every 2 hours  Heat treatment includes hot packs or heat lamps  · Elevate your joint  Elevation helps reduce swelling and pain  Raise your joint above the level of your heart as often as you can  Prop your painful joint on pillows to keep it above your heart comfortably  · Go to physical or occupational therapy as directed  A physical therapist can teach you exercises to improve flexibility and range of motion  You may also be shown non-weight-bearing exercises that are safe for your joints, such as swimming  Exercise can help keep your joints flexible and reduce pain  An occupational therapist can help you learn to do your daily activities when your joints are stiff or sore  · Maintain a healthy weight  Extra weight puts increased pressure on your joints  Ask your healthcare provider what you should weigh  If you need to lose weight, he can help you create a weight loss program  Weight loss can help reduce pain and increase your ability to do your activities  The amount of exercise you do may vary each day, depending on your symptoms  · Wear flat or low-heeled shoes  This will help decrease pain and reduce pressure on your ankle, knee, and hip joints  · Use support devices as directed  You may be given splints to wear on your hands to help your joints rest and to decrease inflammation  While you sleep, use a pillow that is firm enough to support your neck and head    Other equipment  that may help you move and prevent falls:  · Orthotic shoes or insoles  help support your feet when you walk  · Crutches, a cane, or a walker  may help decrease your risk for falling  They also decrease stress on affected joints  · Devices to prevent falls  include raised toilet seats and bathtub bars to help you get up from sitting  Handrails can be placed in areas where you need balance and support  Follow up with your healthcare provider or rheumatologist as directed:  Write down your questions so you remember to ask them during your visits  © 2017 [facility] Information is for End User's use only and may not be sold, redistributed or otherwise used for commercial purposes  All illustrations and images included in CareNotes® are the copyrighted property of [employer] , Matias  or Schuyler Bassett  The above information is an  only  It is not intended as medical advice for individual conditions or treatments  Talk to your doctor, nurse or pharmacist before following any medical regimen to see if it is safe and effective for you

## 2019-10-30 ENCOUNTER — HOSPITAL ENCOUNTER (OUTPATIENT)
Dept: RADIOLOGY | Facility: CLINIC | Age: 29
Discharge: HOME | End: 2019-10-30
Attending: PHYSICAL MEDICINE & REHABILITATION | Admitting: PHYSICAL MEDICINE & REHABILITATION
Payer: COMMERCIAL

## 2019-10-30 VITALS
SYSTOLIC BLOOD PRESSURE: 119 MMHG | RESPIRATION RATE: 18 BRPM | TEMPERATURE: 98.3 F | HEART RATE: 64 BPM | DIASTOLIC BLOOD PRESSURE: 79 MMHG | OXYGEN SATURATION: 98 %

## 2019-10-30 DIAGNOSIS — M54.41 CHRONIC MIDLINE LOW BACK PAIN WITH BILATERAL SCIATICA: ICD-10-CM

## 2019-10-30 DIAGNOSIS — M47.816 LUMBAR SPONDYLOSIS: ICD-10-CM

## 2019-10-30 DIAGNOSIS — G89.29 CHRONIC MIDLINE LOW BACK PAIN WITH BILATERAL SCIATICA: ICD-10-CM

## 2019-10-30 DIAGNOSIS — M54.42 CHRONIC MIDLINE LOW BACK PAIN WITH BILATERAL SCIATICA: ICD-10-CM

## 2019-10-30 PROCEDURE — 64494 INJ PARAVERT F JNT L/S 2 LEV: CPT | Performed by: PHYSICAL MEDICINE & REHABILITATION

## 2019-10-30 PROCEDURE — 64493 INJ PARAVERT F JNT L/S 1 LEV: CPT | Performed by: PHYSICAL MEDICINE & REHABILITATION

## 2019-10-30 RX ORDER — BUPIVACAINE HCL/PF 2.5 MG/ML
5 VIAL (ML) INJECTION ONCE
Status: COMPLETED | OUTPATIENT
Start: 2019-10-30 | End: 2019-10-30

## 2019-10-30 RX ADMIN — BUPIVACAINE HYDROCHLORIDE 3 ML: 2.5 INJECTION, SOLUTION EPIDURAL; INFILTRATION; INTRACAUDAL at 10:20

## 2019-10-30 NOTE — DISCHARGE INSTR - LAB

## 2019-10-30 NOTE — H&P
History of Present Illness:  The patient is a 29 y o  female who presents with complaints of bilateral low back pain    Patient Active Problem List   Diagnosis    Lumbar herniated disc    Overweight    Dysmenorrhea    Iron deficiency anemia    Migraines       Past Medical History:   Diagnosis Date    Dysmenorrhea     Iron deficiency anemia     Lumbar herniated disc     s/p LAINEY    Migraines     Ovarian cyst     On OCP per Gyn    Overweight        Past Surgical History:   Procedure Laterality Date    WISDOM TOOTH EXTRACTION           Current Outpatient Medications:     b complex vitamins capsule, Take 1 capsule by mouth daily, Disp: , Rfl:     diclofenac sodium (VOLTAREN) 1 %, Apply 2 g topically 4 (four) times a day, Disp: 1 Tube, Rfl: 1    ferrous sulfate 325 (65 Fe) mg tablet, Take 325 mg by mouth daily with breakfast, Disp: , Rfl:     ibuprofen (MOTRIN) 600 mg tablet, Take 600 mg by mouth every 8 (eight) hours as needed for moderate pain , Disp: , Rfl:     Multiple Vitamin (MULTIVITAMINS PO), Take 1 tablet by mouth daily , Disp: , Rfl:     Multiple Vitamins-Minerals (HAIR SKIN & NAILS ADVANCED PO), Take 1 tablet by mouth daily, Disp: , Rfl:     Norethin-Eth Estrad-Fe Biphas (LO LOESTRIN FE) 1 MG-10 MCG / 10 MCG TABS, Take 1 tablet by mouth daily Rx per Gyn, Disp: , Rfl:     prochlorperazine (COMPAZINE) 10 mg tablet, Take 1 tablet (10 mg total) by mouth every 8 (eight) hours as needed for nausea or vomiting (Migraine), Disp: 30 tablet, Rfl: 0    rizatriptan (MAXALT-MLT) 10 MG disintegrating tablet, Take 1 tablet (10 mg total) by mouth once as needed for migraine (May repeat once 2 hours later if migraine symptoms persist   Max dose 30mg/24 hours ) for up to 1 dose May repeat in 2 hours if needed, Disp: 9 tablet, Rfl: 0    Current Facility-Administered Medications:     bupivacaine (PF) (MARCAINE) 0 25 % injection 5 mL, 5 mL, Perineural, Once, Aida Serra, DO    No Known Allergies    Physical Exam:   Vitals:    10/30/19 1000   BP: 131/79   Pulse: 70   Resp: 20   Temp: 98 3 °F (36 8 °C)   SpO2: 98%     General: Awake, Alert, Oriented x 3, Mood and affect appropriate  Respiratory: Respirations even and unlabored  Cardiovascular: Peripheral pulses intact; no edema  Musculoskeletal Exam:  Bilateral lumbar paraspinal tenderness to palpation    ASA Score: 2    Patient/Chart Verification  Patient ID Verified: Verbal  Consents Confirmed: To be obtained in the Pre-Procedure area  H&P( within 30 days) Verified: To be obtained in the Pre-Procedure area  Allergies Reviewed: Yes  Anticoag/NSAID held?: NA  Currently on antibiotics?: No  Pregnancy denied?: Yes    Assessment:   1  Chronic midline low back pain with bilateral sciatica    2   Lumbar spondylosis        Plan: Bilateral L3-5 MBB #1

## 2019-11-05 DIAGNOSIS — M47.816 LUMBAR SPONDYLOSIS: Primary | ICD-10-CM

## 2019-11-06 ENCOUNTER — TELEPHONE (OUTPATIENT)
Dept: OBGYN CLINIC | Facility: HOSPITAL | Age: 29
End: 2019-11-06

## 2019-11-06 NOTE — TELEPHONE ENCOUNTER
Caller:  Susie Burleson   Call back:  [phone]    Naseem Olmos called the orthopedic line and left a message to confirm that she would like to follow through with the second blocker appt    Stated Pain is still high, but manageable and did decrease after the first initial heightened pain

## 2019-11-20 ENCOUNTER — HOSPITAL ENCOUNTER (OUTPATIENT)
Dept: RADIOLOGY | Facility: CLINIC | Age: 29
Discharge: HOME | End: 2019-11-20
Attending: PHYSICAL MEDICINE & REHABILITATION | Admitting: PHYSICAL MEDICINE & REHABILITATION
Payer: COMMERCIAL

## 2019-11-20 VITALS
OXYGEN SATURATION: 99 % | DIASTOLIC BLOOD PRESSURE: 75 MMHG | TEMPERATURE: 97.8 F | HEART RATE: 67 BPM | RESPIRATION RATE: 18 BRPM | SYSTOLIC BLOOD PRESSURE: 155 MMHG

## 2019-11-20 DIAGNOSIS — M47.816 LUMBAR SPONDYLOSIS: ICD-10-CM

## 2019-11-20 DIAGNOSIS — G43.009 MIGRAINE WITHOUT AURA AND WITHOUT STATUS MIGRAINOSUS, NOT INTRACTABLE: ICD-10-CM

## 2019-11-20 PROCEDURE — 64494 INJ PARAVERT F JNT L/S 2 LEV: CPT | Performed by: PHYSICAL MEDICINE & REHABILITATION

## 2019-11-20 PROCEDURE — 64493 INJ PARAVERT F JNT L/S 1 LEV: CPT | Performed by: PHYSICAL MEDICINE & REHABILITATION

## 2019-11-20 RX ORDER — BUPIVACAINE HYDROCHLORIDE 7.5 MG/ML
5 INJECTION, SOLUTION EPIDURAL; RETROBULBAR ONCE
Status: COMPLETED | OUTPATIENT
Start: 2019-11-20 | End: 2019-11-20

## 2019-11-20 RX ADMIN — BUPIVACAINE HYDROCHLORIDE 3 ML: 7.5 INJECTION, SOLUTION EPIDURAL; RETROBULBAR at 10:38

## 2019-11-20 NOTE — H&P
History of Present Illness:  The patient is a 29 y o  female who presents with complaints of low back pain    Patient Active Problem List   Diagnosis    Lumbar herniated disc    Overweight    Dysmenorrhea    Iron deficiency anemia    Migraines    Chronic midline low back pain with bilateral sciatica    Lumbar spondylosis       Past Medical History:   Diagnosis Date    Dysmenorrhea     Iron deficiency anemia     Lumbar herniated disc     s/p LAINEY    Migraines     Ovarian cyst     On OCP per Gyn    Overweight        Past Surgical History:   Procedure Laterality Date    WISDOM TOOTH EXTRACTION           Current Outpatient Medications:     b complex vitamins capsule, Take 1 capsule by mouth daily, Disp: , Rfl:     diclofenac sodium (VOLTAREN) 1 %, Apply 2 g topically 4 (four) times a day, Disp: 1 Tube, Rfl: 1    ferrous sulfate 325 (65 Fe) mg tablet, Take 325 mg by mouth daily with breakfast, Disp: , Rfl:     ibuprofen (MOTRIN) 600 mg tablet, Take 600 mg by mouth every 8 (eight) hours as needed for moderate pain , Disp: , Rfl:     Multiple Vitamin (MULTIVITAMINS PO), Take 1 tablet by mouth daily , Disp: , Rfl:     Multiple Vitamins-Minerals (HAIR SKIN & NAILS ADVANCED PO), Take 1 tablet by mouth daily, Disp: , Rfl:     Norethin-Eth Estrad-Fe Biphas (LO LOESTRIN FE) 1 MG-10 MCG / 10 MCG TABS, Take 1 tablet by mouth daily Rx per Gyn, Disp: , Rfl:     prochlorperazine (COMPAZINE) 10 mg tablet, Take 1 tablet (10 mg total) by mouth every 8 (eight) hours as needed for nausea or vomiting (Migraine), Disp: 30 tablet, Rfl: 0    rizatriptan (MAXALT-MLT) 10 MG disintegrating tablet, Take 1 tablet (10 mg total) by mouth once as needed for migraine (May repeat once 2 hours later if migraine symptoms persist   Max dose 30mg/24 hours ) for up to 1 dose May repeat in 2 hours if needed, Disp: 9 tablet, Rfl: 0    Current Facility-Administered Medications:     bupivacaine (PF) (MARCAINE) 0 75 % injection 5 mL, 5 mL, Other, Once, Kamala Giles, DO    No Known Allergies    Physical Exam:   Vitals:    11/20/19 1014   BP: 121/80   Pulse: 64   Resp: 18   Temp: 97 8 °F (36 6 °C)   SpO2: 98%     General: Awake, Alert, Oriented x 3, Mood and affect appropriate  Respiratory: Respirations even and unlabored  Cardiovascular: Peripheral pulses intact; no edema  Musculoskeletal Exam:  Bilateral lumbar paraspinal tenderness to palpation    ASA Score: 2    Patient/Chart Verification  Patient ID Verified: Verbal  ID Band Applied: No  Consents Confirmed: Procedural, To be obtained in the Pre-Procedure area  H&P( within 30 days) Verified: To be obtained in the Pre-Procedure area  Allergies Reviewed: Yes  Anticoag/NSAID held?: No  Currently on antibiotics?: No    Assessment:   1  Lumbar spondylosis        Plan: Bilateral L3 through L5 medial branch block trial 2

## 2019-11-20 NOTE — DISCHARGE INSTR - LAB

## 2019-11-21 RX ORDER — RIZATRIPTAN BENZOATE 10 MG/1
10 TABLET, ORALLY DISINTEGRATING ORAL ONCE AS NEEDED
Qty: 9 TABLET | Refills: 0 | Status: SHIPPED | OUTPATIENT
Start: 2019-11-21 | End: 2019-11-22 | Stop reason: SDUPTHER

## 2019-11-21 RX ORDER — PROCHLORPERAZINE MALEATE 10 MG
10 TABLET ORAL EVERY 8 HOURS PRN
Qty: 30 TABLET | Refills: 0 | Status: SHIPPED | OUTPATIENT
Start: 2019-11-21 | End: 2020-10-21 | Stop reason: SDUPTHER

## 2019-11-21 NOTE — TELEPHONE ENCOUNTER
Medication: Maxalt 10 mg   Last refilled: 7/22/19  Last Office Visit: 7/22/19  Next Office Visit: N/A  Pharmacy:     Medication: Compazine 10 mg   Last refilled: 7/22/19  Last Office Visit:   Next Office Visit:   Pharmacy:

## 2019-11-21 NOTE — TELEPHONE ENCOUNTER
Patient is overdue for F/U appt  Please schedule:    Return in about 6 weeks (around 9/2/2019) for Recheck Fatigue, Migraines, Myalgias, OW, Labs    eRx x 2 sent in the interim

## 2019-11-22 DIAGNOSIS — M54.41 CHRONIC MIDLINE LOW BACK PAIN WITH BILATERAL SCIATICA: ICD-10-CM

## 2019-11-22 DIAGNOSIS — M54.42 CHRONIC MIDLINE LOW BACK PAIN WITH BILATERAL SCIATICA: ICD-10-CM

## 2019-11-22 DIAGNOSIS — G43.009 MIGRAINE WITHOUT AURA AND WITHOUT STATUS MIGRAINOSUS, NOT INTRACTABLE: ICD-10-CM

## 2019-11-22 DIAGNOSIS — M47.816 LUMBAR SPONDYLOSIS: Primary | ICD-10-CM

## 2019-11-22 DIAGNOSIS — G89.29 CHRONIC MIDLINE LOW BACK PAIN WITH BILATERAL SCIATICA: ICD-10-CM

## 2019-11-22 RX ORDER — RIZATRIPTAN BENZOATE 10 MG/1
10 TABLET, ORALLY DISINTEGRATING ORAL ONCE AS NEEDED
Qty: 9 TABLET | Refills: 0 | Status: SHIPPED | OUTPATIENT
Start: 2019-11-22 | End: 2019-11-22 | Stop reason: SDUPTHER

## 2019-11-22 RX ORDER — RIZATRIPTAN BENZOATE 10 MG/1
10 TABLET, ORALLY DISINTEGRATING ORAL ONCE AS NEEDED
Qty: 9 TABLET | Refills: 0 | Status: SHIPPED | OUTPATIENT
Start: 2019-11-22 | End: 2020-10-21 | Stop reason: SDUPTHER

## 2019-12-02 NOTE — PROGRESS NOTES
Assessment/Plan:  Problem List Items Addressed This Visit        Cardiovascular and Mediastinum    Migraines - Primary     Unchanged  Patient anticipates improvement in migraines status post radiofrequency ablation  She is considering Cymbalta 60mg daily for migraine prophylaxis, will do further research, and call if interested in Rx  She plans to use Compazine first-line p r n , and then Maxalt-MLT PRN thereafter  She will check with her insurance Re:  # pills dispensed for 90-day supply of Maxalt-MLT  She is again encouraged to start magnesium oxide 200 mg once daily  Musculoskeletal and Integument    Lumbar herniated disc     Management per Pain Management  Pending radiofrequency ablation  Patient will research medical marijuana to see if she is a candidate  She is also considering Cymbalta 60mg daily  Lumbar spondylosis     See above  Other    Overweight     Recommend lifestyle modifications  Iron deficiency anemia     Stable on MVI and oral Fe  Other Visit Diagnoses     Need for vaccination        Relevant Orders    influenza vaccine, [id number], quadrivalent, 0 5 mL, preservative-free, for adult and pediatric patients 6 mos+ (AFLURIA, FLUARIX, FLULAVAL, FLUZONE) (Completed)    Screening for HIV (human immunodeficiency virus)        Relevant Orders    Human Immunodeficiency Virus 1/2 Antigen / Antibody ( Fourth Generation) with Reflex Testing           Return in about 4 months (around 4/5/2020) for 4mo - Fatigue, Migraines, Myalgias, OW, No Labs   Future Appointments   Date Time Provider Sony De Santiago   12/18/2019 11:00 AM AN S&P 1 [facility]   1/8/2020 11:00 AM AN S&P 1 [facility]        Subjective:     Leigh Fish is a 29 y o  female who presents today for a follow-up on her chronic medical conditions  HPI:  Chief Complaint   Patient presents with    Follow-up     5 month f/u, no concerns        -- Above per clinical staff and reviewed  --      HPI      Today:    Return in about 6 weeks (around 9/2/2019) for Recheck Fatigue, Migraines, Myalgias, OW, Labs - Overdue    Overweight - Watching diet  +Exercise - Works out for 1 hours - Cardio, lifting, 5 days per week        Fatigue - Symptoms x since 5/19  H/O Fe Def Anemia - used to take oral supplements, then D/C due to GI upset and symptom improvement  Stopped taking Fe supplement a few weeks ago  Attributes to worsening chronic back pain - 8/10  Has difficulty finding a comfortable position to sleep, which contributes to migraines        Right Shoulder Pain / Right Arm Spasm / B/L Hips - Improved s/p PT, which caused worsening back pain  Patient has been doing HEP  Saw [facility] Phys[facility] 9/24/19        Migraines - On Compazine 10mg q8 hours PRN, restarted Maxalt-MLT 10mg PRN x 4 months - helpful  Symptoms since 13yo  Previously occurring 1-2 times per month, still occurring 2 times per week  She is using Maxalt MLT once weekly x 2 doses for 1 migraine, other migraine can be relieved by Excedrin  She has only used Compazine PRN nausea and not migraine regardless of nausea status  Chronically poor sleep  Has tension in neck and shoulder  Previously on Maxalt MLT (dose unknown) - sometimes helpful  Using Excedrin PRN - helpful  Improves c sleep, ice pack, bitemporal massage  Denies aura  Occurs upon awakening in the AM or at the end of the day  Bi-temporal headache, across forward, in band distribution  Intense pressure  Worse c movement, photophobia, phonophobia  Sometimes has associated nausea and vertigo  Sometimes lasts 6-12 hours to 2-3 days  Pushes fluids - about 150oz water daily  Has manageable headache today  Previously on Elavil at 13yo - made her paul        PHQ-9 Depression Screening    PHQ-9:    Frequency of the following problems over the past two weeks:       Little interest or pleasure in doing things:  0 - not at all  Feeling down, depressed, or hopeless:  0 - not at all  PHQ-2 Score:  0         LINDA-7 Flowsheet Screening      Most Recent Value   Over the last two weeks, how often have you been bothered by the following problems? Feeling nervous, anxious, or on edge  0   Not being able to stop or control worrying  0   Worrying too much about different things  0   Trouble relaxing   1   Being so restless that it's hard to sit still  0   Becoming easily annoyed or irritable   0   Feeling afraid as if something awful might happen  0   How difficult have these problems made it for you to do your work, take care of things at home, or get along with other people? Not difficult at all   LINDA Score   1          MDQ:  0     Herniated Lumbar Disc - Management per Pain Management per Dr Irlanda Quesada for back pain  Referred to Physiatry  s/p LAINEY at 11yo in [address] [place] [address] - helpful  Worsening back pain  Saw [facility] Phys[facility] PA 9/24/19, s/p Lumbar Spine MRI 10/5/19 -   Showed small broad-based central perfusion protrusion type disc herniation at L5/S1  No significant central foraminal narrowing, pending facet radio-frequency ablation 12/18/19 and 1/8/20 per pain Management           From previous note:    Overweight - Watching diet  +Exercise - Works out for 1-2 hours - Cardio, lifting, 6-7 days per week        Fatigue - Symptoms x since 5/19  H/O Fe Def Anemia - used to take oral supplements, then D/C due to GI upset and symptom improvement  Stopped taking Fe supplement a few weeks ago        Right Shoulder Pain / Right Arm Spasm / B/L Hips - Feels spasms are pulling joints out of sockets  Has decreased ROM of R shoulder with overheard reach and behind back  Using Motrin 600mg PRN 2 times per week s benefit        Migraines - Symptoms since 13yo  Previously occurring 1-2 times per month, now 2 times per week for the past few weeks  Chronically poor sleep  Has tension in neck and shoulder    Previously on Maxalt MLT (dose unknown) - sometimes helpful  Using Excedrin PRN - helpful  Improves c sleep, ice pack, bitemporal massage  Denies aura  Occurs upon awakening in the AM or at the end of the day  Bi-temporal headache, across forward, in band distribution  Intense pressure  Worse c movement, photophobia, phonophobia  Sometimes has associated nausea and vertigo  Sometimes lasts 6-12 hours to 2-3 days  Pushes fluids - about 150oz water daily        Herniated Lumbar Disc - s/p LAINEY at 15yo in [place] - helpful  Worsening back pain  Pending appt c Physiology Dr Christy Aguilar at [facility] next week         Reviewed:  Labs 7/22/19, Cardio 5/9/19, Gyn 1/22/18, Physiatry 10/5/19     Dysmenorrhea - Sees Gyn Ms  Tony Orozco PA-C at [address] [place] [address] yearly  Next appt 1/19 - overdue  Skips placebo week of OCP        Thinks had Td 2008 / 2010 - Td         Overdue for Dentist   [facility] q2 years        PHQ-9 Depression Screening    PHQ-9:    Frequency of the following problems over the past two weeks:       Little interest or pleasure in doing things:  0 - not at all  Feeling down, depressed, or hopeless:  0 - not at all  PHQ-2 Score:  0             LINDA-7 Flowsheet Screening      Most Recent Value   Over the last two weeks, how often have you been bothered by the following problems? Feeling nervous, anxious, or on edge  0   Not being able to stop or control worrying  0   Worrying too much about different things  1   Trouble relaxing   1   Being so restless that it's hard to sit still  0   Becoming easily annoyed or irritable   1   Feeling afraid as if something awful might happen  0   How difficult have these problems made it for you to do your work, take care of things at home, or get along with other people?    Not difficult at all   LINDA Score   3          MDQ:  0         The following portions of the patient's history were reviewed and updated as appropriate: allergies, current medications, past family history, past medical history, past social history, past surgical history and problem list       Review of Systems   Constitutional: Positive for fatigue  Negative for appetite change, chills, diaphoresis and fever  Respiratory: Negative for chest tightness and shortness of breath  Cardiovascular: Negative for chest pain  Gastrointestinal: Negative for abdominal pain, blood in stool, diarrhea, nausea and vomiting  Genitourinary: Negative for dysuria  Musculoskeletal: Positive for back pain and myalgias  Current Outpatient Medications   Medication Sig Dispense Refill    b complex vitamins capsule Take 1 capsule by mouth daily      diclofenac sodium (VOLTAREN) 1 % Apply 2 g topically 4 (four) times a day 1 Tube 1    ferrous sulfate 325 (65 Fe) mg tablet Take 325 mg by mouth daily with breakfast      ibuprofen (MOTRIN) 600 mg tablet Take 600 mg by mouth every 8 (eight) hours as needed for moderate pain       Multiple Vitamin (MULTIVITAMINS PO) Take 1 tablet by mouth daily       Multiple Vitamins-Minerals (HAIR SKIN & NAILS ADVANCED PO) Take 1 tablet by mouth daily      Norethin-Eth Estrad-Fe Biphas (LO LOESTRIN FE) 1 MG-10 MCG / 10 MCG TABS Take 1 tablet by mouth daily Rx per Gyn      prochlorperazine (COMPAZINE) 10 mg tablet Take 1 tablet (10 mg total) by mouth every 8 (eight) hours as needed for nausea or vomiting (Migraine) 30 tablet 0    rizatriptan (MAXALT-MLT) 10 MG disintegrating tablet Take 1 tablet (10 mg total) by mouth once as needed for migraine (Max 30 mg/24 hrs ) May repeat in 2 hours if needed 9 tablet 0     No current facility-administered medications for this visit          Objective:  /78   Pulse 77   Temp 98 3 °F (36 8 °C) (Tympanic)   Resp 16   Ht 5' 7 01" (1 702 m)   Wt 78 7 kg (173 lb 9 6 oz)   SpO2 99%   BMI 27 18 kg/m²    Wt Readings from Last 3 Encounters:   12/05/19 78 7 kg (173 lb 9 6 oz)   10/15/19 77 6 kg (171 lb)   09/24/19 74 8 kg (165 lb)      BP Readings from Last 3 Encounters:   12/05/19 110/78   11/20/19 155/75 10/30/19 119/79          Physical Exam   Constitutional: She is oriented to person, place, and time  She appears well-developed and well-nourished  HENT:   Head: Normocephalic and atraumatic  Eyes: Conjunctivae are normal    Neck: Neck supple  No thyromegaly present  Cardiovascular: Normal rate, regular rhythm, normal heart sounds and intact distal pulses  Pulmonary/Chest: Effort normal and breath sounds normal    Abdominal: Soft  Bowel sounds are normal  She exhibits no distension and no mass  There is no tenderness  There is no rebound and no guarding  Musculoskeletal: She exhibits no edema  Neurological: She is alert and oriented to person, place, and time  She displays normal reflexes  No cranial nerve deficit or sensory deficit  She exhibits normal muscle tone  Coordination normal    Psychiatric: She has a normal mood and affect  Her behavior is normal  Judgment and thought content normal    Nursing note and vitals reviewed  Lab Results:      Lab Results   Component Value Date    WBC 4 09 (L) 07/22/2019    HGB 13 4 07/22/2019    HCT 41 8 07/22/2019     07/22/2019    TRIG 34 07/22/2019    HDL 78 (H) 07/22/2019    ALT 23 07/22/2019    AST 16 07/22/2019     07/01/2014    K 4 5 07/22/2019     07/22/2019    CREATININE 0 86 07/22/2019    BUN 14 07/22/2019    CO2 28 07/22/2019    GLUF 91 07/22/2019    HGBA1C 4 8 07/22/2019     No results found for: URICACID  Invalid input(s): BASENAME Vitamin D    Fl Spine And Pain Procedure    Result Date: 11/20/2019  Narrative: Indication:  Mechanical low back pain Preoperative diagnosis:  1  Lumbar spondylosis                                                2   Low back pain Postoperative diagnosis: 1   Lumbar spondylosis                                                2   Low back pain Procedure: Fluoroscopically-guided  bilateral L3, L4, L5 Medial Branch Nerve/Dorsal Ramus Blocks using 0 75% bupivacaine EBL:  none Specimens:  not applicable After discussing the risks, benefits, and alternatives to the procedure, the patient expressed understanding and wished to proceed  The patient was brought to the fluoroscopy suite and placed in the prone position  Procedural pause conducted to verify:  correct patient identity, procedure to be performed and as applicable, correct side and site, correct patient position, and availability of implants, special equipment and special requirements  Using fluoroscopy, the junction of the transverse process and superior articulating process of the bilateral L3, L4, L5 medial branch levels were identified  The skin was sterilely prepped and draped in the usual fashion using Chloraprep skin prep  Using fluoroscopic guidance, a 3 5 in 25 gauge spinal needle was advanced to each target  After negative aspiration, 0 5cc of 0 75% bupivacaine was injected at each site and the needles were then removed  The patient tolerated the procedure well and there were no apparent complications  After appropriate observation, the patient was dismissed from the clinic in good condition under their own power  The patient was instructed to keep a pain diary and report the results to our office                 POCT Labs

## 2019-12-03 ENCOUNTER — TELEPHONE (OUTPATIENT)
Dept: PAIN MEDICINE | Facility: CLINIC | Age: 29
End: 2019-12-03

## 2019-12-03 NOTE — TELEPHONE ENCOUNTER
Pt is calling to schedule her ablation with Dr Luz Snowden at the [facility] office    Pt can be reached at [phone]

## 2019-12-05 ENCOUNTER — OFFICE VISIT (OUTPATIENT)
Dept: FAMILY MEDICINE CLINIC | Facility: CLINIC | Age: 29
End: 2019-12-05
Payer: COMMERCIAL

## 2019-12-05 VITALS
OXYGEN SATURATION: 99 % | RESPIRATION RATE: 16 BRPM | TEMPERATURE: 98.3 F | SYSTOLIC BLOOD PRESSURE: 110 MMHG | HEIGHT: 67 IN | WEIGHT: 173.6 LBS | BODY MASS INDEX: 27.25 KG/M2 | HEART RATE: 77 BPM | DIASTOLIC BLOOD PRESSURE: 78 MMHG

## 2019-12-05 DIAGNOSIS — Z23 NEED FOR VACCINATION: ICD-10-CM

## 2019-12-05 DIAGNOSIS — M47.816 LUMBAR SPONDYLOSIS: ICD-10-CM

## 2019-12-05 DIAGNOSIS — D50.9 IRON DEFICIENCY ANEMIA, UNSPECIFIED IRON DEFICIENCY ANEMIA TYPE: ICD-10-CM

## 2019-12-05 DIAGNOSIS — G43.009 MIGRAINE WITHOUT AURA AND WITHOUT STATUS MIGRAINOSUS, NOT INTRACTABLE: Primary | ICD-10-CM

## 2019-12-05 DIAGNOSIS — E66.3 OVERWEIGHT: ICD-10-CM

## 2019-12-05 DIAGNOSIS — M51.26 LUMBAR HERNIATED DISC: ICD-10-CM

## 2019-12-05 DIAGNOSIS — Z11.4 SCREENING FOR HIV (HUMAN IMMUNODEFICIENCY VIRUS): ICD-10-CM

## 2019-12-05 PROCEDURE — 99214 OFFICE O/P EST MOD 30 MIN: CPT | Performed by: FAMILY MEDICINE

## 2019-12-05 PROCEDURE — 90686 IIV4 VACC NO PRSV 0.5 ML IM: CPT

## 2019-12-05 PROCEDURE — 3008F BODY MASS INDEX DOCD: CPT | Performed by: FAMILY MEDICINE

## 2019-12-05 PROCEDURE — 90471 IMMUNIZATION ADMIN: CPT

## 2019-12-05 PROCEDURE — 1036F TOBACCO NON-USER: CPT | Performed by: FAMILY MEDICINE

## 2019-12-05 NOTE — PATIENT INSTRUCTIONS
Here is a web site to go to for information on providers who certify medical marijuana:  [email or web address]    Consider Cymbalta for back pain and migraine relief  Start magnesium oxide 200 mg once daily for migraine

## 2019-12-05 NOTE — ASSESSMENT & PLAN NOTE
Management per Pain Management  Pending radiofrequency ablation  Patient will research medical marijuana to see if she is a candidate  She is also considering Cymbalta 60mg daily

## 2019-12-05 NOTE — ASSESSMENT & PLAN NOTE
Unchanged  Patient anticipates improvement in migraines status post radiofrequency ablation  She is considering Cymbalta 60mg daily for migraine prophylaxis, will do further research, and call if interested in Rx  She plans to use Compazine first-line p r n , and then Maxalt-MLT PRN thereafter  She will check with her insurance Re:  # pills dispensed for 90-day supply of Maxalt-MLT  She is again encouraged to start magnesium oxide 200 mg once daily

## 2019-12-18 ENCOUNTER — HOSPITAL ENCOUNTER (OUTPATIENT)
Dept: RADIOLOGY | Facility: CLINIC | Age: 29
Discharge: HOME | End: 2019-12-18
Attending: FAMILY MEDICINE
Payer: COMMERCIAL

## 2019-12-18 ENCOUNTER — TELEPHONE (OUTPATIENT)
Dept: RADIOLOGY | Facility: CLINIC | Age: 29
End: 2019-12-18

## 2019-12-18 VITALS
RESPIRATION RATE: 18 BRPM | SYSTOLIC BLOOD PRESSURE: 114 MMHG | DIASTOLIC BLOOD PRESSURE: 75 MMHG | HEART RATE: 71 BPM | TEMPERATURE: 97.9 F | OXYGEN SATURATION: 97 %

## 2019-12-18 DIAGNOSIS — M54.41 CHRONIC MIDLINE LOW BACK PAIN WITH BILATERAL SCIATICA: ICD-10-CM

## 2019-12-18 DIAGNOSIS — M47.816 LUMBAR SPONDYLOSIS: ICD-10-CM

## 2019-12-18 DIAGNOSIS — G89.29 CHRONIC MIDLINE LOW BACK PAIN WITH BILATERAL SCIATICA: ICD-10-CM

## 2019-12-18 DIAGNOSIS — M54.42 CHRONIC MIDLINE LOW BACK PAIN WITH BILATERAL SCIATICA: ICD-10-CM

## 2019-12-18 PROCEDURE — 64493 INJ PARAVERT F JNT L/S 1 LEV: CPT | Performed by: PHYSICAL MEDICINE & REHABILITATION

## 2019-12-18 PROCEDURE — 64494 INJ PARAVERT F JNT L/S 2 LEV: CPT | Performed by: PHYSICAL MEDICINE & REHABILITATION

## 2019-12-18 RX ORDER — 0.9 % SODIUM CHLORIDE 0.9 %
10 VIAL (ML) INJECTION ONCE
Status: COMPLETED | OUTPATIENT
Start: 2019-12-18 | End: 2019-12-18

## 2019-12-18 RX ORDER — METHYLPREDNISOLONE ACETATE 40 MG/ML
40 INJECTION, SUSPENSION INTRA-ARTICULAR; INTRALESIONAL; INTRAMUSCULAR; PARENTERAL; SOFT TISSUE ONCE
Status: COMPLETED | OUTPATIENT
Start: 2019-12-18 | End: 2019-12-18

## 2019-12-18 RX ORDER — BUPIVACAINE HCL/PF 2.5 MG/ML
5 VIAL (ML) INJECTION ONCE
Status: COMPLETED | OUTPATIENT
Start: 2019-12-18 | End: 2019-12-18

## 2019-12-18 RX ADMIN — SODIUM CHLORIDE 5 ML: 9 INJECTION, SOLUTION INTRAMUSCULAR; INTRAVENOUS; SUBCUTANEOUS at 10:50

## 2019-12-18 RX ADMIN — BUPIVACAINE HYDROCHLORIDE 3 ML: 2.5 INJECTION, SOLUTION EPIDURAL; INFILTRATION; INTRACAUDAL at 11:06

## 2019-12-18 RX ADMIN — Medication 5 ML: at 10:50

## 2019-12-18 RX ADMIN — METHYLPREDNISOLONE ACETATE 40 MG: 40 INJECTION, SUSPENSION INTRA-ARTICULAR; INTRALESIONAL; INTRAMUSCULAR; PARENTERAL; SOFT TISSUE at 11:06

## 2019-12-18 NOTE — DISCHARGE INSTR - LAB

## 2019-12-18 NOTE — H&P
History of Present Illness:  The patient is a 34 y o  female who presents with complaints of right-sided low back pain    Patient Active Problem List   Diagnosis    Lumbar herniated disc    Overweight    Dysmenorrhea    Iron deficiency anemia    Migraines    Chronic midline low back pain with bilateral sciatica    Lumbar spondylosis       Past Medical History:   Diagnosis Date    Arthritis 2005    Dysmenorrhea     Iron deficiency anemia     Lumbar herniated disc     s/p LAINEY    Migraines     Ovarian cyst     On OCP per Gyn    Overweight        Past Surgical History:   Procedure Laterality Date    WISDOM TOOTH EXTRACTION           Current Outpatient Medications:     b complex vitamins capsule, Take 1 capsule by mouth daily, Disp: , Rfl:     diclofenac sodium (VOLTAREN) 1 %, Apply 2 g topically 4 (four) times a day, Disp: 1 Tube, Rfl: 1    ferrous sulfate 325 (65 Fe) mg tablet, Take 325 mg by mouth daily with breakfast, Disp: , Rfl:     ibuprofen (MOTRIN) 600 mg tablet, Take 600 mg by mouth every 8 (eight) hours as needed for moderate pain , Disp: , Rfl:     Multiple Vitamin (MULTIVITAMINS PO), Take 1 tablet by mouth daily , Disp: , Rfl:     Multiple Vitamins-Minerals (HAIR SKIN & NAILS ADVANCED PO), Take 1 tablet by mouth daily, Disp: , Rfl:     Norethin-Eth Estrad-Fe Biphas (LO LOESTRIN FE) 1 MG-10 MCG / 10 MCG TABS, Take 1 tablet by mouth daily Rx per Gyn, Disp: , Rfl:     prochlorperazine (COMPAZINE) 10 mg tablet, Take 1 tablet (10 mg total) by mouth every 8 (eight) hours as needed for nausea or vomiting (Migraine), Disp: 30 tablet, Rfl: 0    rizatriptan (MAXALT-MLT) 10 MG disintegrating tablet, Take 1 tablet (10 mg total) by mouth once as needed for migraine (Max 30 mg/24 hrs ) May repeat in 2 hours if needed, Disp: 9 tablet, Rfl: 0    Current Facility-Administered Medications:     bupivacaine (PF) (MARCAINE) 0 25 % injection 5 mL, 5 mL, Perineural, Once, Antelmo Salmeron DO    lidocaine (PF) (XYLOCAINE-MPF) 2 % injection 10 mL, 10 mL, Perineural, Once, [place] Kaden, DO    methylPREDNISolone acetate (DEPO-MEDROL) injection 40 mg, 40 mg, Perineural, Once, [place] Kaden, DO    sodium chloride (PF) 0 9 % injection 10 mL, 10 mL, Infiltration, Once, Scott Kaden, DO    No Known Allergies    Physical Exam:   Vitals:    12/18/19 1026   BP: 114/75   Pulse: 73   Resp: 16   Temp: 97 9 °F (36 6 °C)   SpO2: 97%     General: Awake, Alert, Oriented x 3, Mood and affect appropriate  Respiratory: Respirations even and unlabored  Cardiovascular: Peripheral pulses intact; no edema  Musculoskeletal Exam:  Right-sided lumbar paraspinal tenderness to palpation    ASA Score: 2    Patient/Chart Verification  Patient ID Verified: Verbal  ID Band Applied: No  Consents Confirmed: Procedural, To be obtained in the Pre-Procedure area  H&P( within 30 days) Verified: To be obtained in the Pre-Procedure area  Allergies Reviewed: Yes  Anticoag/NSAID held?: No  Currently on antibiotics?: No  Pregnancy denied?: Yes    Assessment:   1  Chronic midline low back pain with bilateral sciatica    2   Lumbar spondylosis        Plan: Right sided L3-L5 RFA

## 2019-12-18 NOTE — TELEPHONE ENCOUNTER
F/U R L3-L5 RFA with KW at [facility] on 12/18    Next appt 1/8/2020 for L L3-L5 RFA

## 2019-12-23 NOTE — TELEPHONE ENCOUNTER
FYI    Pt states she is doing well since RFA, noting some relief on right side  Pt reports some muscle soreness from procedure but relieved by ice  Denies s/sx infecton, confirmed 1/8 appt for L RFA  Advised to cb with any questions or concerns

## 2020-01-08 ENCOUNTER — TELEPHONE (OUTPATIENT)
Dept: RADIOLOGY | Facility: CLINIC | Age: 30
End: 2020-01-08

## 2020-01-08 ENCOUNTER — HOSPITAL ENCOUNTER (OUTPATIENT)
Dept: RADIOLOGY | Facility: CLINIC | Age: 30
Discharge: HOME | End: 2020-01-08
Attending: PHYSICAL MEDICINE & REHABILITATION
Payer: COMMERCIAL

## 2020-01-08 VITALS
OXYGEN SATURATION: 98 % | DIASTOLIC BLOOD PRESSURE: 86 MMHG | TEMPERATURE: 97.5 F | SYSTOLIC BLOOD PRESSURE: 130 MMHG | HEART RATE: 63 BPM | RESPIRATION RATE: 18 BRPM

## 2020-01-08 DIAGNOSIS — M54.42 CHRONIC MIDLINE LOW BACK PAIN WITH BILATERAL SCIATICA: ICD-10-CM

## 2020-01-08 DIAGNOSIS — M47.816 LUMBAR SPONDYLOSIS: ICD-10-CM

## 2020-01-08 DIAGNOSIS — M54.41 CHRONIC MIDLINE LOW BACK PAIN WITH BILATERAL SCIATICA: ICD-10-CM

## 2020-01-08 DIAGNOSIS — G89.29 CHRONIC MIDLINE LOW BACK PAIN WITH BILATERAL SCIATICA: ICD-10-CM

## 2020-01-08 PROCEDURE — 64494 INJ PARAVERT F JNT L/S 2 LEV: CPT | Performed by: PHYSICAL MEDICINE & REHABILITATION

## 2020-01-08 PROCEDURE — 64493 INJ PARAVERT F JNT L/S 1 LEV: CPT | Performed by: PHYSICAL MEDICINE & REHABILITATION

## 2020-01-08 RX ORDER — 0.9 % SODIUM CHLORIDE 0.9 %
10 VIAL (ML) INJECTION ONCE
Status: COMPLETED | OUTPATIENT
Start: 2020-01-08 | End: 2020-01-08

## 2020-01-08 RX ORDER — BUPIVACAINE HCL/PF 2.5 MG/ML
5 VIAL (ML) INJECTION ONCE
Status: COMPLETED | OUTPATIENT
Start: 2020-01-08 | End: 2020-01-08

## 2020-01-08 RX ORDER — METHYLPREDNISOLONE ACETATE 40 MG/ML
40 INJECTION, SUSPENSION INTRA-ARTICULAR; INTRALESIONAL; INTRAMUSCULAR; PARENTERAL; SOFT TISSUE ONCE
Status: COMPLETED | OUTPATIENT
Start: 2020-01-08 | End: 2020-01-08

## 2020-01-08 RX ADMIN — SODIUM CHLORIDE 7 ML: 9 INJECTION, SOLUTION INTRAMUSCULAR; INTRAVENOUS; SUBCUTANEOUS at 11:10

## 2020-01-08 RX ADMIN — Medication 7 ML: at 11:10

## 2020-01-08 RX ADMIN — BUPIVACAINE HYDROCHLORIDE 3 ML: 2.5 INJECTION, SOLUTION EPIDURAL; INFILTRATION; INTRACAUDAL at 11:27

## 2020-01-08 RX ADMIN — METHYLPREDNISOLONE ACETATE 40 MG: 40 INJECTION, SUSPENSION INTRA-ARTICULAR; INTRALESIONAL; INTRAMUSCULAR; PARENTERAL; SOFT TISSUE at 11:27

## 2020-01-08 NOTE — DISCHARGE INSTR - LAB

## 2020-01-08 NOTE — H&P
History of Present Illness:  The patient is a 34 y o  female who presents with complaints of left-sided low back pain    Patient Active Problem List   Diagnosis    Lumbar herniated disc    Overweight    Dysmenorrhea    Iron deficiency anemia    Migraines    Chronic midline low back pain with bilateral sciatica    Lumbar spondylosis       Past Medical History:   Diagnosis Date    Arthritis 2005    Dysmenorrhea     Iron deficiency anemia     Lumbar herniated disc     s/p LAINEY    Migraines     Ovarian cyst     On OCP per Gyn    Overweight        Past Surgical History:   Procedure Laterality Date    WISDOM TOOTH EXTRACTION           Current Outpatient Medications:     b complex vitamins capsule, Take 1 capsule by mouth daily, Disp: , Rfl:     diclofenac sodium (VOLTAREN) 1 %, Apply 2 g topically 4 (four) times a day, Disp: 1 Tube, Rfl: 1    ferrous sulfate 325 (65 Fe) mg tablet, Take 325 mg by mouth daily with breakfast, Disp: , Rfl:     ibuprofen (MOTRIN) 600 mg tablet, Take 600 mg by mouth every 8 (eight) hours as needed for moderate pain , Disp: , Rfl:     Multiple Vitamin (MULTIVITAMINS PO), Take 1 tablet by mouth daily , Disp: , Rfl:     Multiple Vitamins-Minerals (HAIR SKIN & NAILS ADVANCED PO), Take 1 tablet by mouth daily, Disp: , Rfl:     Norethin-Eth Estrad-Fe Biphas (LO LOESTRIN FE) 1 MG-10 MCG / 10 MCG TABS, Take 1 tablet by mouth daily Rx per Gyn, Disp: , Rfl:     prochlorperazine (COMPAZINE) 10 mg tablet, Take 1 tablet (10 mg total) by mouth every 8 (eight) hours as needed for nausea or vomiting (Migraine), Disp: 30 tablet, Rfl: 0    rizatriptan (MAXALT-MLT) 10 MG disintegrating tablet, Take 1 tablet (10 mg total) by mouth once as needed for migraine (Max 30 mg/24 hrs ) May repeat in 2 hours if needed, Disp: 9 tablet, Rfl: 0    Current Facility-Administered Medications:     bupivacaine (PF) (MARCAINE) 0 25 % injection 5 mL, 5 mL, Perineural, Once, Aida Serra DO    lidocaine (PF) (XYLOCAINE-MPF) 2 % injection 10 mL, 10 mL, Perineural, Once, Kamala Giles DO    methylPREDNISolone acetate (DEPO-MEDROL) injection 40 mg, 40 mg, Perineural, Once, Kamala Giles DO    sodium chloride (PF) 0 9 % injection 10 mL, 10 mL, Infiltration, Once, Kamala Giles DO    No Known Allergies    Physical Exam:   Vitals:    01/08/20 1041   BP: 117/76   Pulse: 69   Resp: 20   Temp: 97 5 °F (36 4 °C)   SpO2: 96%     General: Awake, Alert, Oriented x 3, Mood and affect appropriate  Respiratory: Respirations even and unlabored  Cardiovascular: Peripheral pulses intact; no edema  Musculoskeletal Exam:  Left-sided lumbar paraspinal tenderness to palpation    ASA Score: 2    Patient/Chart Verification  Patient ID Verified: Verbal  Consents Confirmed: To be obtained in the Pre-Procedure area  H&P( within 30 days) Verified: To be obtained in the Pre-Procedure area  Allergies Reviewed: Yes  Anticoag/NSAID held?: NA  Currently on antibiotics?: No  Pregnancy denied?: Yes    Assessment:   1  Chronic midline low back pain with bilateral sciatica    2   Lumbar spondylosis        Plan: Left-sided L3-L5 RFA

## 2020-01-09 NOTE — TELEPHONE ENCOUNTER
FYI: S/W pt who states she had a lot of pain yesterday, 10/10  States she "put herself to sleep," and woke up today feeling better  Still sore, about 6-7/10  Denies s/s of infection, fever or sunburn-like feeling    Appt made for f/u on 2/11/2020

## 2020-02-04 ENCOUNTER — OFFICE VISIT (OUTPATIENT)
Dept: URGENT CARE | Age: 30
End: 2020-02-04
Payer: COMMERCIAL

## 2020-02-04 ENCOUNTER — HOSPITAL ENCOUNTER (EMERGENCY)
Facility: HOSPITAL | Age: 30
Discharge: HOME | End: 2020-02-04
Attending: EMERGENCY MEDICINE | Admitting: EMERGENCY MEDICINE
Payer: COMMERCIAL

## 2020-02-04 VITALS
HEART RATE: 80 BPM | DIASTOLIC BLOOD PRESSURE: 75 MMHG | SYSTOLIC BLOOD PRESSURE: 141 MMHG | RESPIRATION RATE: 16 BRPM | BODY MASS INDEX: 26.68 KG/M2 | TEMPERATURE: 98.7 F | OXYGEN SATURATION: 100 % | HEIGHT: 67 IN | WEIGHT: 170 LBS

## 2020-02-04 VITALS
SYSTOLIC BLOOD PRESSURE: 147 MMHG | HEART RATE: 84 BPM | RESPIRATION RATE: 16 BRPM | TEMPERATURE: 99 F | OXYGEN SATURATION: 99 % | DIASTOLIC BLOOD PRESSURE: 92 MMHG

## 2020-02-04 DIAGNOSIS — R10.30 LOWER ABDOMINAL PAIN: Primary | ICD-10-CM

## 2020-02-04 DIAGNOSIS — R39.89 POSSIBLE URINARY TRACT INFECTION: ICD-10-CM

## 2020-02-04 DIAGNOSIS — N30.91 HEMORRHAGIC CYSTITIS: Primary | ICD-10-CM

## 2020-02-04 DIAGNOSIS — R31.9 HEMATURIA, UNSPECIFIED TYPE: ICD-10-CM

## 2020-02-04 LAB
ANION GAP SERPL CALCULATED.3IONS-SCNC: 3 MMOL/L (ref 4–13)
BACTERIA UR QL AUTO: ABNORMAL /HPF
BASOPHILS # BLD AUTO: 0.03 THOUSANDS/ΜL (ref 0–0.1)
BASOPHILS NFR BLD AUTO: 0 % (ref 0–1)
BILIRUB UR QL STRIP: NEGATIVE
BUN SERPL-MCNC: 13 MG/DL (ref 5–25)
CALCIUM SERPL-MCNC: 9 MG/DL (ref 8.3–10.1)
CHLORIDE SERPL-SCNC: 106 MMOL/L (ref 100–108)
CLARITY UR: ABNORMAL
CO2 SERPL-SCNC: 26 MMOL/L (ref 21–32)
COLOR UR: ABNORMAL
CREAT SERPL-MCNC: 0.92 MG/DL (ref 0.6–1.3)
EOSINOPHIL # BLD AUTO: 0.04 THOUSAND/ΜL (ref 0–0.61)
EOSINOPHIL NFR BLD AUTO: 1 % (ref 0–6)
ERYTHROCYTE [DISTWIDTH] IN BLOOD BY AUTOMATED COUNT: 12.8 % (ref 11.6–15.1)
EXT PREG TEST URINE: NORMAL
EXT. CONTROL ED NAV: NORMAL
GFR SERPL CREATININE-BSD FRML MDRD: 84 ML/MIN/1.73SQ M
GLUCOSE SERPL-MCNC: 85 MG/DL (ref 65–140)
GLUCOSE UR STRIP-MCNC: NEGATIVE MG/DL
HCT VFR BLD AUTO: 39 % (ref 34.8–46.1)
HGB BLD-MCNC: 12.9 G/DL (ref 11.5–15.4)
HGB UR QL STRIP.AUTO: ABNORMAL
HYALINE CASTS #/AREA URNS LPF: ABNORMAL /LPF
IMM GRANULOCYTES # BLD AUTO: 0.02 THOUSAND/UL (ref 0–0.2)
IMM GRANULOCYTES NFR BLD AUTO: 0 % (ref 0–2)
KETONES UR STRIP-MCNC: ABNORMAL MG/DL
LEUKOCYTE ESTERASE UR QL STRIP: ABNORMAL
LYMPHOCYTES # BLD AUTO: 2.09 THOUSANDS/ΜL (ref 0.6–4.47)
LYMPHOCYTES NFR BLD AUTO: 24 % (ref 14–44)
MCH RBC QN AUTO: 30.8 PG (ref 26.8–34.3)
MCHC RBC AUTO-ENTMCNC: 33.1 G/DL (ref 31.4–37.4)
MCV RBC AUTO: 93 FL (ref 82–98)
MONOCYTES # BLD AUTO: 0.55 THOUSAND/ΜL (ref 0.17–1.22)
MONOCYTES NFR BLD AUTO: 6 % (ref 4–12)
NEUTROPHILS # BLD AUTO: 5.9 THOUSANDS/ΜL (ref 1.85–7.62)
NEUTS SEG NFR BLD AUTO: 69 % (ref 43–75)
NITRITE UR QL STRIP: NEGATIVE
NON-SQ EPI CELLS URNS QL MICRO: ABNORMAL /HPF
NRBC BLD AUTO-RTO: 0 /100 WBCS
PH UR STRIP.AUTO: 7 [PH] (ref 4.5–8)
PLATELET # BLD AUTO: 219 THOUSANDS/UL (ref 149–390)
PMV BLD AUTO: 9 FL (ref 8.9–12.7)
POTASSIUM SERPL-SCNC: 4.4 MMOL/L (ref 3.5–5.3)
PROT UR STRIP-MCNC: >=300 MG/DL
RBC # BLD AUTO: 4.19 MILLION/UL (ref 3.81–5.12)
RBC #/AREA URNS AUTO: ABNORMAL /HPF
SL AMB  POCT GLUCOSE, UA: NEGATIVE
SL AMB LEUKOCYTE ESTERASE,UA: ABNORMAL
SL AMB POCT BILIRUBIN,UA: NEGATIVE
SL AMB POCT BLOOD,UA: ABNORMAL
SL AMB POCT CLARITY,UA: ABNORMAL
SL AMB POCT COLOR,UA: YELLOW
SL AMB POCT KETONES,UA: NEGATIVE
SL AMB POCT NITRITE,UA: NEGATIVE
SL AMB POCT PH,UA: 7
SL AMB POCT SPECIFIC GRAVITY,UA: 1
SL AMB POCT URINE PROTEIN: NEGATIVE
SL AMB POCT UROBILINOGEN: 0.2
SODIUM SERPL-SCNC: 135 MMOL/L (ref 136–145)
SP GR UR STRIP.AUTO: 1.02 (ref 1–1.03)
UROBILINOGEN UR QL STRIP.AUTO: 0.2 E.U./DL
WBC # BLD AUTO: 8.63 THOUSAND/UL (ref 4.31–10.16)
WBC #/AREA URNS AUTO: ABNORMAL /HPF

## 2020-02-04 PROCEDURE — 87086 URINE CULTURE/COLONY COUNT: CPT | Performed by: PHYSICIAN ASSISTANT

## 2020-02-04 PROCEDURE — 81002 URINALYSIS NONAUTO W/O SCOPE: CPT | Performed by: PHYSICIAN ASSISTANT

## 2020-02-04 PROCEDURE — 87186 SC STD MICRODIL/AGAR DIL: CPT

## 2020-02-04 PROCEDURE — 99284 EMERGENCY DEPT VISIT MOD MDM: CPT | Performed by: EMERGENCY MEDICINE

## 2020-02-04 PROCEDURE — 36415 COLL VENOUS BLD VENIPUNCTURE: CPT | Performed by: EMERGENCY MEDICINE

## 2020-02-04 PROCEDURE — 87186 SC STD MICRODIL/AGAR DIL: CPT | Performed by: PHYSICIAN ASSISTANT

## 2020-02-04 PROCEDURE — 85025 COMPLETE CBC W/AUTO DIFF WBC: CPT | Performed by: EMERGENCY MEDICINE

## 2020-02-04 PROCEDURE — 80048 BASIC METABOLIC PNL TOTAL CA: CPT | Performed by: EMERGENCY MEDICINE

## 2020-02-04 PROCEDURE — 87077 CULTURE AEROBIC IDENTIFY: CPT

## 2020-02-04 PROCEDURE — 87086 URINE CULTURE/COLONY COUNT: CPT

## 2020-02-04 PROCEDURE — 81001 URINALYSIS AUTO W/SCOPE: CPT

## 2020-02-04 PROCEDURE — 99213 OFFICE O/P EST LOW 20 MIN: CPT | Performed by: PHYSICIAN ASSISTANT

## 2020-02-04 PROCEDURE — 81025 URINE PREGNANCY TEST: CPT | Performed by: EMERGENCY MEDICINE

## 2020-02-04 PROCEDURE — 99284 EMERGENCY DEPT VISIT MOD MDM: CPT

## 2020-02-04 RX ORDER — CEPHALEXIN 500 MG/1
500 CAPSULE ORAL 3 TIMES DAILY
Qty: 21 CAPSULE | Refills: 0 | Status: SHIPPED | OUTPATIENT
Start: 2020-02-04 | End: 2020-02-11

## 2020-02-04 RX ORDER — CEPHALEXIN 250 MG/1
500 CAPSULE ORAL ONCE
Status: COMPLETED | OUTPATIENT
Start: 2020-02-04 | End: 2020-02-04

## 2020-02-04 RX ORDER — CEPHALEXIN 250 MG/1
500 CAPSULE ORAL 3 TIMES DAILY
Qty: 21 CAPSULE | Refills: 0 | Status: SHIPPED | OUTPATIENT
Start: 2020-02-04 | End: 2020-02-04

## 2020-02-04 RX ADMIN — CEPHALEXIN 500 MG: 250 CAPSULE ORAL at 23:12

## 2020-02-04 NOTE — PATIENT INSTRUCTIONS
Patient would like to go via private vehicle to [facility] Emergency Department  Please go to the [facility] Emergency Department now for further evaluation and treatment- hospital address verified with the patient  Patient agreed to go immediately to the ED

## 2020-02-04 NOTE — PROGRESS NOTES
[address] [facility]        NAME: Esme Barker is a 34 y o  female  : 1990    MRN: [id number]  DATE: 2020  TIME: 6:26 PM    Assessment and Plan   Lower abdominal pain [R10 30]  1  Lower abdominal pain  Transfer to other facility   2  Possible urinary tract infection  POCT urine dip    Urine culture    Transfer to other facility   3  Hematuria, unspecified type  Transfer to other facility     Urine dip is yellow and cloudy, moderate blood, large leuks, negative nitrites  Back pain, lower abdominal pain even without urination that she describes as sharp and stabbing, dysuria, gross hematuria that she describes also is having some clots, patient states she is concerned she has a kidney stone  She states it feels much worse than her previous UTIs    Patient Instructions     Patient would like to go via private vehicle to [facility] Emergency Department  Please go to the [facility] Emergency Department now for further evaluation and treatment- hospital address verified with the patient  Patient agreed to go immediately to the ED  Chief Complaint     Chief Complaint   Patient presents with    Possible UTI     Pt complaining of bladder pressure, dysuria, and blood in urine x1 day  History of Present Illness       79-year-old female presents with lower abdominal pain, dysuria/burning with urination, frequency, blood in urine since yesterday  Patient states yesterday she was having severe sharp and stabbing abdominal pain even without urination  Patient states she is having grossly bloody urine with clots  States this does not seem like her prior UTIs is concerned she is having something else going on such as a kidney stone  States she had to take ibuprofen for the pain with no relief  States the bleeding with so bad she thought she started her menstrual cycle excepted was not vaginal bleeding  She denies any vaginal bleeding, discharge  Admits to having some severe back pain but she is unsure if it is from her back surgery or if it is from her kidneys  She denies any nausea, vomiting or fevers  She denies any pregnancy risk      Review of Systems   Review of Systems   Constitutional: Negative for activity change, appetite change, chills, fatigue and fever  Respiratory: Negative for shortness of breath  Cardiovascular: Negative for chest pain  Gastrointestinal: Positive for abdominal pain  Negative for diarrhea, nausea and vomiting  Genitourinary: Positive for dysuria, frequency, hematuria and urgency  Negative for decreased urine volume, flank pain, genital sores, pelvic pain, vaginal bleeding, vaginal discharge and vaginal pain  Musculoskeletal: Positive for back pain  Negative for myalgias  Neurological: Negative for dizziness, weakness and light-headedness  All other systems reviewed and are negative          Current Medications       Current Outpatient Medications:     b complex vitamins capsule, Take 1 capsule by mouth daily, Disp: , Rfl:     ferrous sulfate 325 (65 Fe) mg tablet, Take 325 mg by mouth daily with breakfast, Disp: , Rfl:     ibuprofen (MOTRIN) 600 mg tablet, Take 600 mg by mouth every 8 (eight) hours as needed for moderate pain , Disp: , Rfl:     Multiple Vitamin (MULTIVITAMINS PO), Take 1 tablet by mouth daily , Disp: , Rfl:     Norethin-Eth Estrad-Fe Biphas (LO LOESTRIN FE) 1 MG-10 MCG / 10 MCG TABS, Take 1 tablet by mouth daily Rx per Gyn, Disp: , Rfl:     diclofenac sodium (VOLTAREN) 1 %, Apply 2 g topically 4 (four) times a day (Patient not taking: Reported on 2/4/2020), Disp: 1 Tube, Rfl: 1    Multiple Vitamins-Minerals (HAIR SKIN & NAILS ADVANCED PO), Take 1 tablet by mouth daily, Disp: , Rfl:     prochlorperazine (COMPAZINE) 10 mg tablet, Take 1 tablet (10 mg total) by mouth every 8 (eight) hours as needed for nausea or vomiting (Migraine) (Patient not taking: Reported on 2/4/2020), Disp: 30 tablet, Rfl: 0    rizatriptan (MAXALT-MLT) 10 MG disintegrating tablet, Take 1 tablet (10 mg total) by mouth once as needed for migraine (Max 30 mg/24 hrs ) May repeat in 2 hours if needed (Patient not taking: Reported on 2/4/2020), Disp: 9 tablet, Rfl: 0    Current Allergies     Allergies as of 02/04/2020    (No Known Allergies)            The following portions of the patient's history were reviewed and updated as appropriate: allergies, current medications, past family history, past medical history, past social history, past surgical history and problem list      Past Medical History:   Diagnosis Date    Arthritis 2005    Dysmenorrhea     Iron deficiency anemia     Lumbar herniated disc     s/p LAINEY    Migraines     Ovarian cyst     On OCP per Gyn    Overweight        Past Surgical History:   Procedure Laterality Date    WISDOM TOOTH EXTRACTION         Family History   Problem Relation Age of Onset    No Known Problems Mother     Hypertension Father     Breast cancer Maternal Grandmother     Rheum arthritis Maternal Grandmother         Hands effected    Arthritis Maternal Grandmother         General    Cancer Maternal Grandmother         Breast    Heart disease Maternal Grandfather     Diabetes Maternal Grandfather     Stroke Maternal Grandfather         Stroke and heart attack    Lung cancer Paternal Grandmother     Scoliosis Sister     Endometriosis Sister     Ovarian cysts Sister     Depression Brother         Anxiety related    Anxiety disorder Brother          Medications have been verified  Objective   /75 (BP Location: Left arm, Patient Position: Sitting)   Pulse 80   Temp 98 7 °F (37 1 °C) (Temporal)   Resp 16   Ht 5' 7" (1 702 m)   Wt 77 1 kg (170 lb)   SpO2 100%   BMI 26 63 kg/m²        Physical Exam     Physical Exam   Constitutional: She is oriented to person, place, and time  She appears well-developed and well-nourished     HENT:   Head: Normocephalic and atraumatic  Mouth/Throat: Oropharynx is clear and moist    Cardiovascular: Normal rate, regular rhythm and normal heart sounds  Pulmonary/Chest: Effort normal and breath sounds normal  She has no wheezes  Abdominal: Soft  Bowel sounds are normal  There is tenderness in the right lower quadrant, suprapubic area and left lower quadrant  There is no rebound and no guarding  No CVA tenderness   Neurological: She is alert and oriented to person, place, and time  Skin: Capillary refill takes less than 2 seconds  Psychiatric: She has a normal mood and affect  Her behavior is normal    Nursing note and vitals reviewed

## 2020-02-05 NOTE — ED PROVIDER NOTES
ASSESSMENT AND PLAN    Palma Olsen is a 34 y o  female who presents for evaluation of hematuria as noted below, concerning for hemorrhagic cystitis  She has no flank pain, or groin point to suggest nephrolithiasis  On arrival, the patient is hemodynamically stable and well-appearing without acute distress, with a nontoxic appearance  On exam, the patient is soft, nontender, and overall quite benign abdominal exam   The physical exam is otherwise unremarkable   -Labwork largely unremarkable  -urine pregnancy negative  Urinalysis with possible UTI  -the patient was offered renal ultrasound, however instead requested she follow up with her primary care physician for further management  -will start Keflex for treatment of presumed UTI, and hemorrhagic cystitis  -will discharge home  Strict return precautions provided  Provided with outpatient follow up with Urology should her hematuria not improved  History  Chief Complaint   Patient presents with    Painful Urination     Pt reports bloody urine and painful urination; pt seen at urgent care yesterday, urgent care stated urine sample was negative for UTI and referred to ED for further evaluation of possible kidney stone     HPI this is a 63-year-old female who presents for evaluation of hematuria  The patient states her symptoms have been going on for 1 day, and have decreased in volume  She states that she has mainly had blood-tinged urine, however has also noticed small clots inside her urine as well  The patient also notes suprapubic pressure, which is improved after urinating, however dysuria present after urinating  She has never had symptoms like this before  The patient states the blood occurs only when she is peeing, and denies vaginal bleeding  The patient denies any fevers, chills, chest pain, short of breath, nausea, vomiting, diarrhea, flank pain, back pain, frequency, or urgency    She denies any recent travel, surgeries, hospitalizations, or sick contacts  She is sexually active with 1 partner, has no new sexual partners, and reports no vaginal discharge, or dyspareunia  She has no family history of bladder cancer or bladder pathology  She has no known history of kidney stones, or bleeding/clotting disorders  Prior to Admission Medications   Prescriptions Last Dose Informant Patient Reported? Taking?    Multiple Vitamin (MULTIVITAMINS PO)   Yes No   Sig: Take 1 tablet by mouth daily    Multiple Vitamins-Minerals (HAIR SKIN & NAILS ADVANCED PO)   Yes No   Sig: Take 1 tablet by mouth daily   Norethin-Eth Estrad-Fe Biphas (LO LOESTRIN FE) 1 MG-10 MCG / 10 MCG TABS   Yes No   Sig: Take 1 tablet by mouth daily Rx per Gyn   b complex vitamins capsule   Yes No   Sig: Take 1 capsule by mouth daily   diclofenac sodium (VOLTAREN) 1 %   No No   Sig: Apply 2 g topically 4 (four) times a day   Patient not taking: Reported on 2/4/2020   ferrous sulfate 325 (65 Fe) mg tablet   Yes No   Sig: Take 325 mg by mouth daily with breakfast   ibuprofen (MOTRIN) 600 mg tablet   Yes No   Sig: Take 600 mg by mouth every 8 (eight) hours as needed for moderate pain    prochlorperazine (COMPAZINE) 10 mg tablet   No No   Sig: Take 1 tablet (10 mg total) by mouth every 8 (eight) hours as needed for nausea or vomiting (Migraine)   Patient not taking: Reported on 2/4/2020   rizatriptan (MAXALT-MLT) 10 MG disintegrating tablet   No No   Sig: Take 1 tablet (10 mg total) by mouth once as needed for migraine (Max 30 mg/24 hrs ) May repeat in 2 hours if needed   Patient not taking: Reported on 2/4/2020      Facility-Administered Medications: None       Past Medical History:   Diagnosis Date    Arthritis 2005    Dysmenorrhea     Iron deficiency anemia     Lumbar herniated disc     s/p LAINEY    Migraines     Ovarian cyst     On OCP per Gyn    Overweight        Past Surgical History:   Procedure Laterality Date    WISDOM TOOTH EXTRACTION         Family History   Problem Relation Age of Onset    No Known Problems Mother     Hypertension Father     Breast cancer Maternal Grandmother     Rheum arthritis Maternal Grandmother         Hands effected    Arthritis Maternal Grandmother         General    Cancer Maternal Grandmother         Breast    Heart disease Maternal Grandfather     Diabetes Maternal Grandfather     Stroke Maternal Grandfather         Stroke and heart attack    Lung cancer Paternal Grandmother     Scoliosis Sister     Endometriosis Sister     Ovarian cysts Sister     Depression Brother         Anxiety related    Anxiety disorder Brother      I have reviewed and agree with the history as documented  Social History     Tobacco Use    Smoking status: Never Smoker    Smokeless tobacco: Never Used   Substance Use Topics    Alcohol use: Not Currently     Alcohol/week: 1 0 standard drinks     Types: 1 Shots of liquor per week     Frequency: 2-4 times a month     Drinks per session: 1 or 2     Binge frequency: Never    Drug use: Never        Review of Systems   Constitutional: Negative for chills and fever  HENT: Negative for congestion and rhinorrhea  Eyes: Negative for photophobia and visual disturbance  Respiratory: Negative for cough and shortness of breath  Cardiovascular: Negative for chest pain and palpitations  Gastrointestinal: Positive for abdominal pain  Negative for diarrhea, nausea and vomiting  Genitourinary: Positive for dysuria and hematuria  Negative for frequency  Musculoskeletal: Negative for neck pain and neck stiffness  Skin: Negative for pallor and rash  Neurological: Negative for light-headedness and headaches  All other systems reviewed and are negative        Physical Exam  ED Triage Vitals [02/04/20 1937]   Temperature Pulse Respirations Blood Pressure SpO2   99 °F (37 2 °C) 95 16 140/98 100 %      Temp Source Heart Rate Source Patient Position - Orthostatic VS BP Location FiO2 (%)   Oral Monitor Sitting Left arm -- Pain Score       4             Orthostatic Vital Signs  Vitals:    02/04/20 1937 02/04/20 2145   BP: 140/98 147/92   Pulse: 95 84   Patient Position - Orthostatic VS: Sitting Lying       Physical Exam   Constitutional: She is oriented to person, place, and time  Awake and alert, well appearing, no acute distress  Nontoxic in appearance  Mental status appropriate   HENT:   Head: Normocephalic and atraumatic  Mouth/Throat: Oropharynx is clear and moist  No oropharyngeal exudate  Eyes: Pupils are equal, round, and reactive to light  EOM are normal  Right eye exhibits no discharge  Left eye exhibits no discharge  No scleral icterus  Neck: Normal range of motion  Neck supple  No JVD present  No tracheal deviation present  Cardiovascular: Normal rate, regular rhythm and normal heart sounds  No murmur heard  Pulmonary/Chest: Effort normal  No stridor  No respiratory distress  She has no wheezes  She has no rales  Abdominal: Soft  She exhibits no distension and no mass  There is no tenderness  Musculoskeletal: Normal range of motion  She exhibits no edema or deformity  Neurological: She is alert and oriented to person, place, and time  No cranial nerve deficit  She exhibits normal muscle tone  Skin: Skin is warm and dry  No rash noted  No pallor         ED Medications  Medications   cephalexin (KEFLEX) capsule 500 mg (500 mg Oral Given 2/4/20 2312)       Diagnostic Studies  Results Reviewed     Procedure Component Value Units Date/Time    Basic metabolic panel [[id number]]  (Abnormal) Collected:  02/04/20 2135    Lab Status:  Final result Specimen:  Blood from Arm, Right Updated:  02/04/20 2156     Sodium 135 mmol/L      Potassium 4 4 mmol/L      Chloride 106 mmol/L      CO2 26 mmol/L      ANION GAP 3 mmol/L      BUN 13 mg/dL      Creatinine 0 92 mg/dL      Glucose 85 mg/dL      Calcium 9 0 mg/dL      eGFR 84 ml/min/1 73sq m     Narrative:       [place] guidelines for Chronic Kidney Disease (CKD):     Stage 1 with normal or high GFR (GFR > 90 mL/min/1 73 square meters)    Stage 2 Mild CKD (GFR = 60-89 mL/min/1 73 square meters)    Stage 3A Moderate CKD (GFR = 45-59 mL/min/1 73 square meters)    Stage 3B Moderate CKD (GFR = 30-44 mL/min/1 73 square meters)    Stage 4 Severe CKD (GFR = 15-29 mL/min/1 73 square meters)    Stage 5 End Stage CKD (GFR <15 mL/min/1 73 square meters)  Note: GFR calculation is accurate only with a steady state creatinine    CBC and differential [[id number]] Collected:  02/04/20 2135    Lab Status:  Final result Specimen:  Blood from Arm, Right Updated:  02/04/20 2151     WBC 8 63 Thousand/uL      RBC 4 19 Million/uL      Hemoglobin 12 9 g/dL      Hematocrit 39 0 %      MCV 93 fL      MCH 30 8 pg      MCHC 33 1 g/dL      RDW 12 8 %      MPV 9 0 fL      Platelets 497 Thousands/uL      nRBC 0 /100 WBCs      Neutrophils Relative 69 %      Immat GRANS % 0 %      Lymphocytes Relative 24 %      Monocytes Relative 6 %      Eosinophils Relative 1 %      Basophils Relative 0 %      Neutrophils Absolute 5 90 Thousands/µL      Immature Grans Absolute 0 02 Thousand/uL      Lymphocytes Absolute 2 09 Thousands/µL      Monocytes Absolute 0 55 Thousand/µL      Eosinophils Absolute 0 04 Thousand/µL      Basophils Absolute 0 03 Thousands/µL     Urine Microscopic [[id number]]  (Abnormal) Collected:  02/04/20 2057    Lab Status:  Final result Specimen:  Urine, Clean Catch Updated:  02/04/20 2130     RBC, UA Innumerable /hpf      WBC, UA Innumerable /hpf      Epithelial Cells Occasional /hpf      Bacteria, UA Occasional /hpf      Hyaline Casts, UA 5-10 /lpf     Urine culture [[id number]] Collected:  02/04/20 2057    Lab Status:   In process Specimen:  Urine, Clean Catch Updated:  02/04/20 2130    POCT pregnancy, urine [[id number]]  (Normal) Resulted:  02/04/20 2128    Lab Status:  Final result Updated:  02/04/20 2129     EXT PREG TEST UR (Ref: Negative) neg preg     Control control valid    Urine Macroscopic, POC [[id number]]  (Abnormal) Collected:  02/04/20 2057    Lab Status:  Final result Specimen:  Urine Updated:  02/04/20 2054     Color, UA Bloody     Clarity, UA Cloudy     pH, UA 7 0     Leukocytes, UA Small     Nitrite, UA Negative     Protein, UA >=300 mg/dl      Glucose, UA Negative mg/dl      Ketones, UA Trace mg/dl      Urobilinogen, UA 0 2 E U /dl      Bilirubin, UA Negative     Blood, UA Large     Specific Gravity, UA 1 025    Narrative:       [facility] RESULT                 No orders to display         Procedures  Procedures      ED Course                               MDM      Disposition  Final diagnoses:   Hemorrhagic cystitis     Time reflects when diagnosis was documented in both MDM as applicable and the Disposition within this note     Time User Action Codes Description Comment    2/4/2020 10:53 PM Karlie Record Add [N30 91] Hemorrhagic cystitis       ED Disposition     ED Disposition Condition Date/Time Comment    Discharge Stable Tue Feb 4, 2020 10:53 PM [facility] discharge to home/self care              Follow-up Information     Follow up With Specialties Details Why Contact Info Additional Information    Marcia Felder DO Family Medicine Call  To schedule an appointment for re-evaluation as needed [address]  [address] [facility] Schedule an appointment as soon as possible for a visit   [address] [address]  [address] [place] [address] [facility] [place], [address] [place], [place], [address]          Discharge Medication List as of 2/4/2020 10:55 PM      START taking these medications    Details   cephalexin (KEFLEX) 250 mg capsule Take 2 capsules (500 mg total) by mouth 3 (three) times a day for 7 days, Starting Tue 2/4/2020, Until Tue 2/11/2020, Print         CONTINUE these medications which have NOT CHANGED    Details   b complex vitamins capsule Take 1 capsule by mouth daily, Historical Med      diclofenac sodium (VOLTAREN) 1 % Apply 2 g topically 4 (four) times a day, Starting Tue 10/15/2019, Normal      ferrous sulfate 325 (65 Fe) mg tablet Take 325 mg by mouth daily with breakfast, Historical Med      ibuprofen (MOTRIN) 600 mg tablet Take 600 mg by mouth every 8 (eight) hours as needed for moderate pain , Historical Med      Multiple Vitamin (MULTIVITAMINS PO) Take 1 tablet by mouth daily , Historical Med      Multiple Vitamins-Minerals (HAIR SKIN & NAILS ADVANCED PO) Take 1 tablet by mouth daily, Historical Med      Norethin-Eth Estrad-Fe Biphas (LO LOESTRIN FE) 1 MG-10 MCG / 10 MCG TABS Take 1 tablet by mouth daily Rx per Gyn, Starting Mon 1/23/2017, Historical Med      prochlorperazine (COMPAZINE) 10 mg tablet Take 1 tablet (10 mg total) by mouth every 8 (eight) hours as needed for nausea or vomiting (Migraine), Starting Thu 11/21/2019, Normal      rizatriptan (MAXALT-MLT) 10 MG disintegrating tablet Take 1 tablet (10 mg total) by mouth once as needed for migraine (Max 30 mg/24 hrs ) May repeat in 2 hours if needed, Starting Fri 11/22/2019, Normal           No discharge procedures on file  ED Provider  Attending physically available and evaluated David Dukes I managed the patient along with the ED Attending      Electronically Signed by         Dedrick Mejia MD  02/05/20 2291

## 2020-02-05 NOTE — ED ATTENDING ATTESTATION
2/4/2020  I, Camelia Noble MD, saw and evaluated the patient  I have discussed the patient with the resident/non-physician practitioner and agree with the resident's/non-physician practitioner's findings, Plan of Care, and MDM as documented in the resident's/non-physician practitioner's note, except where noted  All available labs and Radiology studies were reviewed  I was present for key portions of any procedure(s) performed by the resident/non-physician practitioner and I was immediately available to provide assistance  At this point I agree with the current assessment done in the Emergency Department  I have conducted an independent evaluation of this patient a history and physical is as follows: Pt presents with hematuria for 2 days with some suprapubic pressure    No flank pain No fevers  Some urgency and frequency Pt feels bleeding was worse yesterday no thinners PE: alert heart reg lungs clear abd soft mild suprapubic tenderness no cva tenderness MDM: will check urine labs  ED Course         Critical Care Time  Procedures

## 2020-02-06 LAB
BACTERIA UR CULT: ABNORMAL
BACTERIA UR CULT: ABNORMAL

## 2020-05-19 ENCOUNTER — TELEMEDICINE (OUTPATIENT)
Dept: PAIN MEDICINE | Facility: CLINIC | Age: 30
End: 2020-05-19
Payer: COMMERCIAL

## 2020-05-19 DIAGNOSIS — M51.16 LUMBAR DISC DISEASE WITH RADICULOPATHY: Primary | ICD-10-CM

## 2020-05-19 DIAGNOSIS — M62.838 MUSCLE SPASM: ICD-10-CM

## 2020-05-19 DIAGNOSIS — M47.816 LUMBAR SPONDYLOSIS: ICD-10-CM

## 2020-05-19 PROCEDURE — 99214 OFFICE O/P EST MOD 30 MIN: CPT | Performed by: PHYSICAL MEDICINE & REHABILITATION

## 2020-05-19 RX ORDER — METHOCARBAMOL 500 MG/1
500 TABLET, FILM COATED ORAL 2 TIMES DAILY PRN
Qty: 60 TABLET | Refills: 1 | Status: SHIPPED | OUTPATIENT
Start: 2020-05-19

## 2020-05-19 RX ORDER — METHOCARBAMOL 500 MG/1
500 TABLET, FILM COATED ORAL 4 TIMES DAILY
Qty: 60 TABLET | Refills: 1 | Status: SHIPPED | OUTPATIENT
Start: 2020-05-19 | End: 2020-05-19

## 2020-05-26 ENCOUNTER — TELEPHONE (OUTPATIENT)
Dept: PAIN MEDICINE | Facility: MEDICAL CENTER | Age: 30
End: 2020-05-26

## 2020-06-03 ENCOUNTER — HOSPITAL ENCOUNTER (OUTPATIENT)
Dept: RADIOLOGY | Facility: CLINIC | Age: 30
Discharge: HOME | End: 2020-06-03
Attending: PHYSICAL MEDICINE & REHABILITATION | Admitting: PHYSICAL MEDICINE & REHABILITATION
Payer: COMMERCIAL

## 2020-06-03 VITALS
HEART RATE: 63 BPM | RESPIRATION RATE: 18 BRPM | DIASTOLIC BLOOD PRESSURE: 74 MMHG | TEMPERATURE: 98.6 F | SYSTOLIC BLOOD PRESSURE: 111 MMHG | OXYGEN SATURATION: 98 %

## 2020-06-03 DIAGNOSIS — M51.16 LUMBAR DISC DISEASE WITH RADICULOPATHY: ICD-10-CM

## 2020-06-03 DIAGNOSIS — M47.816 LUMBAR SPONDYLOSIS: ICD-10-CM

## 2020-06-03 DIAGNOSIS — M62.838 MUSCLE SPASM: ICD-10-CM

## 2020-06-03 PROCEDURE — 62323 NJX INTERLAMINAR LMBR/SAC: CPT | Performed by: PHYSICAL MEDICINE & REHABILITATION

## 2020-06-03 RX ORDER — 0.9 % SODIUM CHLORIDE 0.9 %
10 VIAL (ML) INJECTION ONCE
Status: COMPLETED | OUTPATIENT
Start: 2020-06-03 | End: 2020-06-03

## 2020-06-03 RX ORDER — PAPAVERINE HCL 150 MG
20 CAPSULE, EXTENDED RELEASE ORAL ONCE
Status: COMPLETED | OUTPATIENT
Start: 2020-06-03 | End: 2020-06-03

## 2020-06-03 RX ORDER — LIDOCAINE HYDROCHLORIDE 10 MG/ML
5 INJECTION, SOLUTION EPIDURAL; INFILTRATION; INTRACAUDAL; PERINEURAL ONCE
Status: COMPLETED | OUTPATIENT
Start: 2020-06-03 | End: 2020-06-03

## 2020-06-03 RX ADMIN — DEXAMETHASONE SODIUM PHOSPHATE 20 MG: 10 INJECTION, SOLUTION INTRAMUSCULAR; INTRAVENOUS at 08:14

## 2020-06-03 RX ADMIN — IOHEXOL 1 ML: 300 INJECTION, SOLUTION INTRAVENOUS at 08:14

## 2020-06-03 RX ADMIN — LIDOCAINE HYDROCHLORIDE 3 ML: 10 INJECTION, SOLUTION EPIDURAL; INFILTRATION; INTRACAUDAL; PERINEURAL at 08:13

## 2020-06-03 RX ADMIN — SODIUM CHLORIDE 4 ML: 9 INJECTION, SOLUTION INTRAMUSCULAR; INTRAVENOUS; SUBCUTANEOUS at 08:20

## 2020-06-10 ENCOUNTER — TELEPHONE (OUTPATIENT)
Dept: PAIN MEDICINE | Facility: CLINIC | Age: 30
End: 2020-06-10

## 2020-06-10 NOTE — TELEPHONE ENCOUNTER
Pt reports more than 50% improvement post inj  Pain level 4/10, sharp pains go up to 6/10   She said when laying and sitting the pain has improved   She said she still gets pain with quick movements  Pt aware takes up to 2wks for full effect

## 2020-10-21 ENCOUNTER — OFFICE VISIT (OUTPATIENT)
Dept: FAMILY MEDICINE CLINIC | Facility: CLINIC | Age: 30
End: 2020-10-21
Payer: COMMERCIAL

## 2020-10-21 VITALS
WEIGHT: 197.6 LBS | BODY MASS INDEX: 31.01 KG/M2 | RESPIRATION RATE: 16 BRPM | OXYGEN SATURATION: 99 % | DIASTOLIC BLOOD PRESSURE: 78 MMHG | HEIGHT: 67 IN | SYSTOLIC BLOOD PRESSURE: 116 MMHG | TEMPERATURE: 98.6 F | HEART RATE: 88 BPM

## 2020-10-21 DIAGNOSIS — R00.2 PALPITATIONS: ICD-10-CM

## 2020-10-21 DIAGNOSIS — Z11.59 NEED FOR HEPATITIS C SCREENING TEST: ICD-10-CM

## 2020-10-21 DIAGNOSIS — Z13.6 SCREENING FOR CARDIOVASCULAR CONDITION: ICD-10-CM

## 2020-10-21 DIAGNOSIS — Z00.00 ANNUAL PHYSICAL EXAM: Primary | ICD-10-CM

## 2020-10-21 DIAGNOSIS — M62.838 MUSCLE SPASM: ICD-10-CM

## 2020-10-21 DIAGNOSIS — M79.10 MYALGIA: ICD-10-CM

## 2020-10-21 DIAGNOSIS — G43.009 MIGRAINE WITHOUT AURA AND WITHOUT STATUS MIGRAINOSUS, NOT INTRACTABLE: ICD-10-CM

## 2020-10-21 DIAGNOSIS — Z23 NEED FOR VACCINATION: ICD-10-CM

## 2020-10-21 DIAGNOSIS — N94.6 DYSMENORRHEA: ICD-10-CM

## 2020-10-21 DIAGNOSIS — Z11.4 SCREENING FOR HIV (HUMAN IMMUNODEFICIENCY VIRUS): ICD-10-CM

## 2020-10-21 DIAGNOSIS — M24.9 HYPERMOBILITY OF JOINT: ICD-10-CM

## 2020-10-21 DIAGNOSIS — D50.9 IRON DEFICIENCY ANEMIA, UNSPECIFIED IRON DEFICIENCY ANEMIA TYPE: ICD-10-CM

## 2020-10-21 DIAGNOSIS — M51.16 LUMBAR DISC DISEASE WITH RADICULOPATHY: ICD-10-CM

## 2020-10-21 DIAGNOSIS — E66.9 CLASS 1 OBESITY WITHOUT SERIOUS COMORBIDITY WITH BODY MASS INDEX (BMI) OF 30.0 TO 30.9 IN ADULT, UNSPECIFIED OBESITY TYPE: ICD-10-CM

## 2020-10-21 PROCEDURE — 90686 IIV4 VACC NO PRSV 0.5 ML IM: CPT | Performed by: FAMILY MEDICINE

## 2020-10-21 PROCEDURE — 90471 IMMUNIZATION ADMIN: CPT | Performed by: FAMILY MEDICINE

## 2020-10-21 PROCEDURE — 99214 OFFICE O/P EST MOD 30 MIN: CPT | Performed by: FAMILY MEDICINE

## 2020-10-21 PROCEDURE — 99395 PREV VISIT EST AGE 18-39: CPT | Performed by: FAMILY MEDICINE

## 2020-10-21 PROCEDURE — 93000 ELECTROCARDIOGRAM COMPLETE: CPT | Performed by: FAMILY MEDICINE

## 2020-10-21 PROCEDURE — 1036F TOBACCO NON-USER: CPT | Performed by: FAMILY MEDICINE

## 2020-10-21 PROCEDURE — 3725F SCREEN DEPRESSION PERFORMED: CPT | Performed by: FAMILY MEDICINE

## 2020-10-21 RX ORDER — RIZATRIPTAN BENZOATE 10 MG/1
10 TABLET, ORALLY DISINTEGRATING ORAL ONCE AS NEEDED
Qty: 9 TABLET | Refills: 0 | Status: SHIPPED | OUTPATIENT
Start: 2020-10-21 | End: 2021-02-02 | Stop reason: SDUPTHER

## 2020-10-21 RX ORDER — RIZATRIPTAN BENZOATE 10 MG/1
10 TABLET, ORALLY DISINTEGRATING ORAL ONCE AS NEEDED
Qty: 9 TABLET | Refills: 0 | Status: CANCELLED | OUTPATIENT
Start: 2020-10-21

## 2020-10-21 RX ORDER — MELATONIN
1000 DAILY
COMMUNITY

## 2020-10-21 RX ORDER — PROCHLORPERAZINE MALEATE 10 MG
10 TABLET ORAL EVERY 8 HOURS PRN
Qty: 30 TABLET | Refills: 0 | Status: SHIPPED | OUTPATIENT
Start: 2020-10-21

## 2020-10-21 RX ORDER — LANOLIN ALCOHOL/MO/W.PET/CERES
3 CREAM (GRAM) TOPICAL
COMMUNITY

## 2020-10-22 ENCOUNTER — TRANSCRIBE ORDERS (OUTPATIENT)
Dept: LAB | Facility: CLINIC | Age: 30
End: 2020-10-22

## 2020-10-22 ENCOUNTER — LAB (OUTPATIENT)
Dept: LAB | Facility: CLINIC | Age: 30
End: 2020-10-22
Payer: COMMERCIAL

## 2020-10-22 DIAGNOSIS — M24.9 HYPERMOBILITY OF JOINT: ICD-10-CM

## 2020-10-22 DIAGNOSIS — E03.9 HYPOTHYROIDISM, UNSPECIFIED TYPE: ICD-10-CM

## 2020-10-22 DIAGNOSIS — M62.838 SPASM OF MUSCLE: ICD-10-CM

## 2020-10-22 DIAGNOSIS — R71.8 ELEVATED MCV: ICD-10-CM

## 2020-10-22 DIAGNOSIS — M24.9 DERANGEMENT, ELBOW INTERNAL: ICD-10-CM

## 2020-10-22 DIAGNOSIS — M79.10 MYALGIA: ICD-10-CM

## 2020-10-22 DIAGNOSIS — Z13.6 SCREENING FOR ISCHEMIC HEART DISEASE: ICD-10-CM

## 2020-10-22 DIAGNOSIS — Z11.59 NEED FOR HEPATITIS C SCREENING TEST: ICD-10-CM

## 2020-10-22 DIAGNOSIS — D50.9 IRON DEFICIENCY ANEMIA, UNSPECIFIED IRON DEFICIENCY ANEMIA TYPE: ICD-10-CM

## 2020-10-22 DIAGNOSIS — M62.838 MUSCLE SPASM: ICD-10-CM

## 2020-10-22 DIAGNOSIS — R00.2 PALPITATIONS: ICD-10-CM

## 2020-10-22 DIAGNOSIS — R00.2 PALPITATIONS: Primary | ICD-10-CM

## 2020-10-22 DIAGNOSIS — E66.9 OBESITY, UNSPECIFIED CLASSIFICATION, UNSPECIFIED OBESITY TYPE, UNSPECIFIED WHETHER SERIOUS COMORBIDITY PRESENT: ICD-10-CM

## 2020-10-22 DIAGNOSIS — E83.19 IRON EXCESS: ICD-10-CM

## 2020-10-22 DIAGNOSIS — Z11.4 SCREENING FOR HIV (HUMAN IMMUNODEFICIENCY VIRUS): ICD-10-CM

## 2020-10-22 LAB
25(OH)D3 SERPL-MCNC: 41 NG/ML (ref 30–100)
ALBUMIN SERPL BCP-MCNC: 3.9 G/DL (ref 3.5–5)
ALP SERPL-CCNC: 50 U/L (ref 46–116)
ALT SERPL W P-5'-P-CCNC: 32 U/L (ref 12–78)
ANION GAP SERPL CALCULATED.3IONS-SCNC: 9 MMOL/L (ref 4–13)
AST SERPL W P-5'-P-CCNC: 19 U/L (ref 5–45)
BASOPHILS # BLD AUTO: 0.03 THOUSANDS/ΜL (ref 0–0.1)
BASOPHILS NFR BLD AUTO: 1 % (ref 0–1)
BILIRUB SERPL-MCNC: 0.37 MG/DL (ref 0.2–1)
BUN SERPL-MCNC: 11 MG/DL (ref 5–25)
CALCIUM SERPL-MCNC: 9 MG/DL (ref 8.3–10.1)
CHLORIDE SERPL-SCNC: 105 MMOL/L (ref 100–108)
CHOLEST SERPL-MCNC: 193 MG/DL (ref 50–200)
CO2 SERPL-SCNC: 25 MMOL/L (ref 21–32)
CREAT SERPL-MCNC: 1.09 MG/DL (ref 0.6–1.3)
CRP SERPL QL: 10.8 MG/L
EOSINOPHIL # BLD AUTO: 0.03 THOUSAND/ΜL (ref 0–0.61)
EOSINOPHIL NFR BLD AUTO: 1 % (ref 0–6)
ERYTHROCYTE [DISTWIDTH] IN BLOOD BY AUTOMATED COUNT: 12.4 % (ref 11.6–15.1)
FERRITIN SERPL-MCNC: 13 NG/ML (ref 8–388)
FOLATE SERPL-MCNC: >20 NG/ML (ref 3.1–17.5)
GFR SERPL CREATININE-BSD FRML MDRD: 69 ML/MIN/1.73SQ M
GLUCOSE P FAST SERPL-MCNC: 88 MG/DL (ref 65–99)
HCT VFR BLD AUTO: 43.1 % (ref 34.8–46.1)
HCV AB SER QL: NORMAL
HDLC SERPL-MCNC: 76 MG/DL
HGB BLD-MCNC: 13.2 G/DL (ref 11.5–15.4)
IMM GRANULOCYTES # BLD AUTO: 0.02 THOUSAND/UL (ref 0–0.2)
IMM GRANULOCYTES NFR BLD AUTO: 0 % (ref 0–2)
IRON SERPL-MCNC: 188 UG/DL (ref 50–170)
LDLC SERPL CALC-MCNC: 103 MG/DL (ref 0–100)
LDLC SERPL DIRECT ASSAY-MCNC: 113 MG/DL (ref 0–100)
LYMPHOCYTES # BLD AUTO: 1.72 THOUSANDS/ΜL (ref 0.6–4.47)
LYMPHOCYTES NFR BLD AUTO: 30 % (ref 14–44)
MAGNESIUM SERPL-MCNC: 2.2 MG/DL (ref 1.6–2.6)
MCH RBC QN AUTO: 30.6 PG (ref 26.8–34.3)
MCHC RBC AUTO-ENTMCNC: 30.6 G/DL (ref 31.4–37.4)
MCV RBC AUTO: 100 FL (ref 82–98)
MONOCYTES # BLD AUTO: 0.45 THOUSAND/ΜL (ref 0.17–1.22)
MONOCYTES NFR BLD AUTO: 8 % (ref 4–12)
NEUTROPHILS # BLD AUTO: 3.49 THOUSANDS/ΜL (ref 1.85–7.62)
NEUTS SEG NFR BLD AUTO: 60 % (ref 43–75)
NONHDLC SERPL-MCNC: 117 MG/DL
NRBC BLD AUTO-RTO: 0 /100 WBCS
PLATELET # BLD AUTO: 258 THOUSANDS/UL (ref 149–390)
PMV BLD AUTO: 8.9 FL (ref 8.9–12.7)
POTASSIUM SERPL-SCNC: 4.4 MMOL/L (ref 3.5–5.3)
PROT SERPL-MCNC: 7.5 G/DL (ref 6.4–8.2)
RBC # BLD AUTO: 4.32 MILLION/UL (ref 3.81–5.12)
RETICS # AUTO: NORMAL 10*3/UL (ref 14097–95744)
RETICS # CALC: 1.05 % (ref 0.37–1.87)
RHEUMATOID FACT SER QL LA: NEGATIVE
SODIUM SERPL-SCNC: 139 MMOL/L (ref 136–145)
T4 FREE SERPL-MCNC: 1.03 NG/DL (ref 0.76–1.46)
TIBC SERPL-MCNC: 472 UG/DL (ref 250–450)
TRANSFERRIN SERPL-MCNC: 399 MG/DL (ref 200–400)
TRIGL SERPL-MCNC: 71 MG/DL
TSH SERPL DL<=0.05 MIU/L-ACNC: 5.12 UIU/ML (ref 0.36–3.74)
URATE SERPL-MCNC: 4 MG/DL (ref 2–6.8)
VIT B12 SERPL-MCNC: 363 PG/ML (ref 100–900)
WBC # BLD AUTO: 5.74 THOUSAND/UL (ref 4.31–10.16)

## 2020-10-22 PROCEDURE — 85025 COMPLETE CBC W/AUTO DIFF WBC: CPT

## 2020-10-22 PROCEDURE — 84443 ASSAY THYROID STIM HORMONE: CPT

## 2020-10-22 PROCEDURE — 82728 ASSAY OF FERRITIN: CPT

## 2020-10-22 PROCEDURE — 84466 ASSAY OF TRANSFERRIN: CPT

## 2020-10-22 PROCEDURE — 82306 VITAMIN D 25 HYDROXY: CPT

## 2020-10-22 PROCEDURE — 83550 IRON BINDING TEST: CPT

## 2020-10-22 PROCEDURE — 87389 HIV-1 AG W/HIV-1&-2 AB AG IA: CPT

## 2020-10-22 PROCEDURE — 85045 AUTOMATED RETICULOCYTE COUNT: CPT

## 2020-10-22 PROCEDURE — 83540 ASSAY OF IRON: CPT

## 2020-10-22 PROCEDURE — 86038 ANTINUCLEAR ANTIBODIES: CPT

## 2020-10-22 PROCEDURE — 82607 VITAMIN B-12: CPT | Performed by: FAMILY MEDICINE

## 2020-10-22 PROCEDURE — 84550 ASSAY OF BLOOD/URIC ACID: CPT

## 2020-10-22 PROCEDURE — 80053 COMPREHEN METABOLIC PANEL: CPT

## 2020-10-22 PROCEDURE — 82746 ASSAY OF FOLIC ACID SERUM: CPT | Performed by: FAMILY MEDICINE

## 2020-10-22 PROCEDURE — 36415 COLL VENOUS BLD VENIPUNCTURE: CPT

## 2020-10-22 PROCEDURE — 84439 ASSAY OF FREE THYROXINE: CPT

## 2020-10-22 PROCEDURE — 80061 LIPID PANEL: CPT

## 2020-10-22 PROCEDURE — 83735 ASSAY OF MAGNESIUM: CPT

## 2020-10-22 PROCEDURE — 86200 CCP ANTIBODY: CPT

## 2020-10-22 PROCEDURE — 86803 HEPATITIS C AB TEST: CPT

## 2020-10-22 PROCEDURE — 83721 ASSAY OF BLOOD LIPOPROTEIN: CPT

## 2020-10-22 PROCEDURE — 86140 C-REACTIVE PROTEIN: CPT

## 2020-10-22 PROCEDURE — 86430 RHEUMATOID FACTOR TEST QUAL: CPT

## 2020-10-22 RX ORDER — LEVOTHYROXINE SODIUM 0.03 MG/1
TABLET ORAL
Qty: 30 TABLET | Refills: 1 | Status: SHIPPED | OUTPATIENT
Start: 2020-10-22 | End: 2020-12-16 | Stop reason: SDUPTHER

## 2020-10-23 LAB
HIV 1+2 AB+HIV1 P24 AG SERPL QL IA: NORMAL
RYE IGE QN: NEGATIVE

## 2020-10-24 LAB — CCP IGA+IGG SERPL IA-ACNC: 6 UNITS (ref 0–19)

## 2020-11-05 ENCOUNTER — HOSPITAL ENCOUNTER (OUTPATIENT)
Dept: NON INVASIVE DIAGNOSTICS | Facility: CLINIC | Age: 30
Discharge: HOME | End: 2020-11-05
Payer: COMMERCIAL

## 2020-11-05 DIAGNOSIS — R00.2 PALPITATIONS: ICD-10-CM

## 2020-11-05 PROCEDURE — 93226 XTRNL ECG REC<48 HR SCAN A/R: CPT

## 2020-11-05 PROCEDURE — 93225 XTRNL ECG REC<48 HRS REC: CPT

## 2020-11-10 PROCEDURE — 93227 XTRNL ECG REC<48 HR R&I: CPT | Performed by: INTERNAL MEDICINE

## 2020-11-18 DIAGNOSIS — E03.9 HYPOTHYROIDISM, UNSPECIFIED TYPE: ICD-10-CM

## 2020-11-18 RX ORDER — LEVOTHYROXINE SODIUM 0.03 MG/1
TABLET ORAL
Qty: 30 TABLET | Refills: 1 | OUTPATIENT
Start: 2020-11-18

## 2020-11-21 DIAGNOSIS — E03.9 HYPOTHYROIDISM, UNSPECIFIED TYPE: ICD-10-CM

## 2020-11-21 RX ORDER — LEVOTHYROXINE SODIUM 0.03 MG/1
TABLET ORAL
Qty: 30 TABLET | Refills: 0 | Status: CANCELLED | OUTPATIENT
Start: 2020-11-21

## 2020-12-12 ENCOUNTER — TRANSCRIBE ORDERS (OUTPATIENT)
Dept: ADMINISTRATIVE | Age: 30
End: 2020-12-12

## 2020-12-12 ENCOUNTER — LAB (OUTPATIENT)
Dept: LAB | Age: 30
End: 2020-12-12
Payer: COMMERCIAL

## 2020-12-12 DIAGNOSIS — E06.9 THYROIDITIS: Primary | ICD-10-CM

## 2020-12-12 DIAGNOSIS — E03.9 HYPOTHYROIDISM, UNSPECIFIED TYPE: ICD-10-CM

## 2020-12-12 DIAGNOSIS — E06.9 THYROIDITIS: ICD-10-CM

## 2020-12-12 LAB — TSH SERPL DL<=0.05 MIU/L-ACNC: 2.57 UIU/ML (ref 0.36–3.74)

## 2020-12-12 PROCEDURE — 86376 MICROSOMAL ANTIBODY EACH: CPT

## 2020-12-12 PROCEDURE — 36415 COLL VENOUS BLD VENIPUNCTURE: CPT

## 2020-12-12 PROCEDURE — 84443 ASSAY THYROID STIM HORMONE: CPT

## 2020-12-12 PROCEDURE — 84432 ASSAY OF THYROGLOBULIN: CPT

## 2020-12-12 PROCEDURE — 86800 THYROGLOBULIN ANTIBODY: CPT

## 2020-12-13 LAB — THYROPEROXIDASE AB SERPL-ACNC: 10 IU/ML (ref 0–34)

## 2020-12-14 DIAGNOSIS — E03.9 HYPOTHYROIDISM, UNSPECIFIED TYPE: ICD-10-CM

## 2020-12-14 RX ORDER — LEVOTHYROXINE SODIUM 0.03 MG/1
TABLET ORAL
Qty: 30 TABLET | Refills: 1 | OUTPATIENT
Start: 2020-12-14

## 2020-12-15 LAB
THYROGLOB AB SERPL-ACNC: <1 IU/ML (ref 0–0.9)
THYROGLOB AB SERPL-ACNC: <1 IU/ML (ref 0–0.9)
THYROGLOB SERPL-MCNC: 22.4 NG/ML (ref 1.5–38.5)

## 2020-12-16 DIAGNOSIS — E03.9 HYPOTHYROIDISM, UNSPECIFIED TYPE: ICD-10-CM

## 2020-12-17 RX ORDER — LEVOTHYROXINE SODIUM 0.03 MG/1
TABLET ORAL
Qty: 90 TABLET | Refills: 1 | Status: SHIPPED | OUTPATIENT
Start: 2020-12-17 | End: 2021-06-18 | Stop reason: SDUPTHER

## 2021-02-02 DIAGNOSIS — G43.009 MIGRAINE WITHOUT AURA AND WITHOUT STATUS MIGRAINOSUS, NOT INTRACTABLE: ICD-10-CM

## 2021-02-02 RX ORDER — RIZATRIPTAN BENZOATE 10 MG/1
10 TABLET, ORALLY DISINTEGRATING ORAL ONCE AS NEEDED
Qty: 9 TABLET | Refills: 0 | Status: SHIPPED | OUTPATIENT
Start: 2021-02-02 | End: 2021-05-19 | Stop reason: SDUPTHER

## 2021-02-02 RX ORDER — RIZATRIPTAN BENZOATE 10 MG/1
TABLET, ORALLY DISINTEGRATING ORAL
Qty: 9 TABLET | Refills: 0 | OUTPATIENT
Start: 2021-02-02

## 2021-02-02 NOTE — TELEPHONE ENCOUNTER
Medication refill requested: maxalt  Last office visit: 10/21/20  Next office visit: none  Last refilled: 10/21/20 #9x0  Pharmacy:   [address] [place] [address] Zuri Malik [address]  Phone: [phone] Fax: [phone]      Pended: 9x0      Patient will need appointment - Return in about 6 months (around 4/21/2021) for 6mo - Migraines, Myalgias, Obesity, No Labs

## 2021-02-02 NOTE — TELEPHONE ENCOUNTER
eRx sent  Please schedule:    Return in about 6 months (around 4/21/2021) for 6mo - Migraines, Myalgias, Obesity, No Labs

## 2021-02-03 NOTE — TELEPHONE ENCOUNTER
Placed call to patient, she is unable to schedule at this time due to her work schedule  Patient will call back to schedule closer to April

## 2021-02-17 NOTE — PROGRESS NOTES
Assessment and Plan:   ***    Plan:  Diagnoses and all orders for this visit:    Benign joint hypermobility    Myalgia    Polyarthralgia  -     Sjogren's Antibodies  -     Sedimentation rate, automated  -     C-reactive protein  -     HLA-B27 antigen  -     CBC and differential    Chronic bilateral low back pain with bilateral sciatica  -     XR sacroiliac joints 3+ views; Future        Follow-up plan: ***      HPI  Divine Nava is a 27 y o   female with lumbar DDD with herniation with radiculopathy, SINGH, migraines, hypothyroidism, who presents for rheumatology consult by request of Dr Harvinder Gonzalez for joint hypermobility  She follows with pain management for her lumbar disc herniation and has received injections  Denies photosensitivity, rashes, psoriasis, sicca symptoms, oral or nasal ulcers, alopecia, Raynaud's, h/o pericarditis or pleurisy, h/o blood clots or miscarriages  Review of Systems  Review of Systems    Allergies  No Known Allergies    Home Medications    Current Outpatient Medications:     cholecalciferol (VITAMIN D3) 1,000 units tablet, Take 1,000 Units by mouth daily, Disp: , Rfl:     diclofenac sodium (VOLTAREN) 1 %, Apply 2 g topically 4 (four) times a day, Disp: 1 Tube, Rfl: 1    levothyroxine 25 mcg tablet, 1 pill by mouth once daily on an empty stomach, 30 minutes prior to eating food or drink  , Disp: 90 tablet, Rfl: 1    melatonin 3 mg, Take 3 mg by mouth daily at bedtime 1-2 tabs by mouth QHS  , Disp: , Rfl:     methocarbamol (ROBAXIN) 500 mg tablet, Take 1 tablet (500 mg total) by mouth 2 (two) times a day as needed for muscle spasms, Disp: 60 tablet, Rfl: 1    Multiple Vitamin (MULTIVITAMINS PO), Take 1 tablet by mouth daily , Disp: , Rfl:     Norethin-Eth Estrad-Fe Biphas (LO LOESTRIN FE) 1 MG-10 MCG / 10 MCG TABS, Take 1 tablet by mouth daily Rx per Gyn, Disp: , Rfl:     prochlorperazine (COMPAZINE) 10 mg tablet, Take 1 tablet (10 mg total) by mouth every 8 (eight) hours as needed for nausea or vomiting (Migraine), Disp: 30 tablet, Rfl: 0    rizatriptan (MAXALT-MLT) 10 MG disintegrating tablet, Take 1 tablet (10 mg total) by mouth once as needed for migraine (Max 30 mg/24 hrs ) May repeat in 2 hours if needed, Disp: 9 tablet, Rfl: 0    Past Medical History  Past Medical History:   Diagnosis Date    Anemia 2005    Arthritis 2005    Class 1 obesity without serious comorbidity with body mass index (BMI) of 30 0 to 30 9 in adult     Dysmenorrhea     Hyperlipidemia     Hypothyroid     Iron deficiency anemia     Lumbar herniated disc     s/p LAINEY    Migraines     Ovarian cyst     On OCP per Gyn    Overweight        Past Surgical History   Past Surgical History:   Procedure Laterality Date    SPINE SURGERY  12/2019    On [facility] at [facility] TOOTH EXTRACTION         Family History  No known family history of autoimmune or inflammatory diseases      Family History   Problem Relation Age of Onset    No Known Problems Mother     Hypertension Father     Breast cancer Maternal Grandmother     Rheum arthritis Maternal Grandmother         Hands effected    Arthritis Maternal Grandmother         General    Cancer Maternal Grandmother         Breast    Heart disease Maternal Grandfather     Diabetes Maternal Grandfather     Stroke Maternal Grandfather         Stroke and heart attack    Lung cancer Paternal Grandmother     Scoliosis Sister     Endometriosis Sister     Ovarian cysts Sister     Depression Brother         Anxiety related    Anxiety disorder Brother        Social History  Occupation: ***  Social History     Substance and Sexual Activity   Alcohol Use Not Currently    Alcohol/week: 0 0 standard drinks    Frequency: 2-4 times a month    Drinks per session: 1 or 2    Binge frequency: Never     Social History     Substance and Sexual Activity   Drug Use Never     Social History     Tobacco Use   Smoking Status Never Smoker   Smokeless Tobacco Never Used       Objective: There were no vitals filed for this visit  Physical Exam    Imaging:   MRI lumbar 10/2019:     IMPRESSION:     Small broad-based central protrusion type disc herniation L5-S1  No significant central or foraminal narrowing      XR left hip 2019:  Images personally reviewed in PACS and my impression is:  Normal     Labs:   Component      Latest Ref Rng & Units 12/12/2020           8:20 AM   THYROGLOBULIN AB      0 0 - 0 9 IU/mL <1 0   TSH 3RD GENERATON      0 358 - 3 740 uIU/mL 2 570   THYROID MICROSOMAL ANTIBODY      0 - 34 IU/mL 10   Thyroglobulin-RENO      1 5 - 38 5 ng/mL 22 4     Component      Latest Ref Rng & Units 10/22/2020   WBC      4 31 - 10 16 Thousand/uL 5 74   Red Blood Cell Count      3 81 - 5 12 Million/uL 4 32   Hemoglobin      11 5 - 15 4 g/dL 13 2   HCT      34 8 - 46 1 % 43 1   MCV      82 - 98 fL 100 (H)   MCH      26 8 - 34 3 pg 30 6   MCHC      31 4 - 37 4 g/dL 30 6 (L)   RDW      11 6 - 15 1 % 12 4   MPV      8 9 - 12 7 fL 8 9   Platelet Count      877 - 390 Thousands/uL 258   nRBC      /100 WBCs 0   Neutrophils %      43 - 75 % 60   Immat GRANS %      0 - 2 % 0   Lymphocytes Relative      14 - 44 % 30   Monocytes Relative      4 - 12 % 8   Eosinophils      0 - 6 % 1   Basophils Relative      0 - 1 % 1   Absolute Neutrophils      1 85 - 7 62 Thousands/µL 3 49   Immature Grans Absolute      0 00 - 0 20 Thousand/uL 0 02   Lymphocytes Absolute      0 60 - 4 47 Thousands/µL 1 72   Absolute Monocytes      0 17 - 1 22 Thousand/µL 0 45   Absolute Eosinophils      0 00 - 0 61 Thousand/µL 0 03   Basophils Absolute      0 00 - 0 10 Thousands/µL 0 03   Sodium      136 - 145 mmol/L 139   Potassium      3 5 - 5 3 mmol/L 4 4   Chloride      100 - 108 mmol/L 105   CO2      21 - 32 mmol/L 25   Anion Gap      4 - 13 mmol/L 9   BUN      5 - 25 mg/dL 11   Creatinine      0 60 - 1 30 mg/dL 1 09   GLUCOSE FASTING      65 - 99 mg/dL 88   Calcium      8 3 - 10 1 mg/dL 9 0   AST      5 - 45 U/L 19   ALT      12 - 78 U/L 32   Alkaline Phosphatase      46 - 116 U/L 50   Total Protein      6 4 - 8 2 g/dL 7 5   Albumin      3 5 - 5 0 g/dL 3 9   TOTAL BILIRUBIN      0 20 - 1 00 mg/dL 0 37   eGFR      ml/min/1 73sq m 69   HIV-1/2 AB-AG      Non-Reactive Non-Reactive   HEPATITIS C ANTIBODY      Non-reactive Non-reactive   RHEUMATOID FACTOR      Negative Negative   Vit D, 25-Hydroxy      30 0 - 100 0 ng/mL 41 0   ANTI-NUCLEAR ANTIBODY (SANGITA)      Negative Negative   C-REACTIVE PROTEIN      <3 0 mg/L 10 8 (H)   CYCLIC CITRULLINATED PEPTIDE ANTIBODY      0 - 19 units 6   URIC ACID      2 0 - 6 8 mg/dL 4 0   Vitamin B-12      100 - 900 pg/mL 363

## 2021-02-18 ENCOUNTER — TELEMEDICINE (OUTPATIENT)
Dept: RHEUMATOLOGY | Facility: CLINIC | Age: 31
End: 2021-02-18
Payer: COMMERCIAL

## 2021-02-18 DIAGNOSIS — M25.50 POLYARTHRALGIA: ICD-10-CM

## 2021-02-18 DIAGNOSIS — M24.9 HYPERMOBILITY OF JOINT: ICD-10-CM

## 2021-02-18 DIAGNOSIS — M54.42 CHRONIC BILATERAL LOW BACK PAIN WITH BILATERAL SCIATICA: ICD-10-CM

## 2021-02-18 DIAGNOSIS — M54.41 CHRONIC BILATERAL LOW BACK PAIN WITH BILATERAL SCIATICA: ICD-10-CM

## 2021-02-18 DIAGNOSIS — M62.838 MUSCLE SPASM: ICD-10-CM

## 2021-02-18 DIAGNOSIS — M79.10 MYALGIA: ICD-10-CM

## 2021-02-18 DIAGNOSIS — M35.7 BENIGN JOINT HYPERMOBILITY: Primary | ICD-10-CM

## 2021-02-18 DIAGNOSIS — G89.29 CHRONIC BILATERAL LOW BACK PAIN WITH BILATERAL SCIATICA: ICD-10-CM

## 2021-02-18 PROCEDURE — 1036F TOBACCO NON-USER: CPT | Performed by: INTERNAL MEDICINE

## 2021-02-18 PROCEDURE — 99214 OFFICE O/P EST MOD 30 MIN: CPT | Performed by: INTERNAL MEDICINE

## 2021-02-18 NOTE — PROGRESS NOTES
Virtual Brief Visit    Assessment and Plan:   Patient is a 80-year-old female who presents for rheumatology consult regarding joint hypermobility and chronic joint pain along with chronic lower back pain  She does have facet arthropathy and disc herniation in does follow with spine and Pain Management regarding this  We discussed that in Rheumatology we mainly rule out inflammatory spine condition like ankylosing spondylitis  We reviewed that her workup for rheumatologic markers a few months ago was negative for SANGITA, RF and CCP which is reassuring  She also does not provided typical history for inflammatory arthritis  We will rule out underlying inflammatory spine disease and Sjogren syndrome given her dry eyes  But we discussed I have a low suspicion for underlying rheumatologic disease  We discussed that the CRP is nonspecific and mildly elevated for her, which could be related to other medical issues or normal variant  If her workup is unremarkable for an inflammatory type arthropathy or spondylitis she will not need additional rheumatologic follow-up  We did also discuss that it does sound like she has benign joint hypermobility and the mainstay of treatment is physical activity and exercise, which she does already tried to do consistently so I encouraged her in this regard        Problem List Items Addressed This Visit     None      Visit Diagnoses     Benign joint hypermobility    -  Primary    Myalgia        Polyarthralgia        Relevant Orders    Sjogren's Antibodies    Sedimentation rate, automated    C-reactive protein    HLA-B27 antigen    Chronic bilateral low back pain with bilateral sciatica        Relevant Orders    XR sacroiliac joints 3+ views    Hypermobility of joint        Muscle spasm                Follow-up plan: no rheumatology follow-up needed if work-up negative         Reason for visit is   Chief Complaint   Patient presents with    Virtual Brief Visit        Encounter provider Dread Beckett DO    Provider located at [address] [address] [place] [address]    Recent Visits  No visits were found meeting these conditions  Showing recent visits within past 7 days and meeting all other requirements     Today's Visits  Date Type Provider Dept   02/18/21 Telemedicine Dread Beckett DO  [facility]   Showing today's visits and meeting all other requirements     Future Appointments  No visits were found meeting these conditions  Showing future appointments within next 150 days and meeting all other requirements      It was my intent to perform this visit via video technology but the patient was not able to do a video connection so the visit was completed via audio telephone only  After connecting through [facility] and patient was informed that this is a secure, HIPAA-compliant platform  She agrees to proceed  , the patient was identified by name and date of birth  Chirag Castro was informed that this is a telemedicine visit and that the visit is being conducted through [facility] and patient was informed that this is a secure, HIPAA-compliant platform  She agrees to proceed     My office door was closed  No one else was in the room  She acknowledged consent and understanding of privacy and security of the platform  The patient has agreed to participate and understands she can discontinue the visit at any time  Patient is aware this is a billable service  Subjective    Chirag Castro is a 27 y o  female with lumbar DDD with herniation with radiculopathy, SINGH, migraines, hypothyroidism, who presents for rheumatology consult by request of Dr Trina Beard for joint hypermobility  She has chronic hypermobility in her joints and "instability"      Recalls going to [address] years ago for evaluation of her right shoulder instability and was mention at that time that possibly she has joint hypermobility syndrome  She has chronic pain in the right shoulder, knees and hips, also in the lower back  Knee pain is worse after prolonged activity, no morning stiffness  Always has stiffness in her hips and lower back, including in the morning, which never really resolves or goes away completely  Right shoulder feels like there is a"pressure" there  Some stiffness in the morning too that never goes away completely  She follows with pain management for her lumbar disc herniation and has received injections and nerve ablations  Lower back stiffness in the morning as well that does improve somewhat when she is moving but again never really goes away completely  If she goes for a long time without exercising or inactivity, she does notice worsening  She does feel better if she is consistent with her physical activity and exercising  No obvious joint swelling  She occasionally will use Tylenol or Advil for joint and back pain but not often  She has chronic dry eyes but always attributed this to seasonal allergies  She uses occasional topical diclofenac when joint pain is severe  Denies photosensitivity, rashes, psoriasis, oral or nasal ulcers, alopecia, Raynaud's, h/o pericarditis or pleurisy, h/o blood clots or miscarriages  Social History:   Never smoker, no current alcohol use, no drug use       Family History:  No known autoimmune or inflammatory disease in the family      Past Medical History:   Diagnosis Date    Anemia 2005    Arthritis 2005    Class 1 obesity without serious comorbidity with body mass index (BMI) of 30 0 to 30 9 in adult     Dysmenorrhea     Hyperlipidemia     Hypothyroid     Iron deficiency anemia     Lumbar herniated disc     s/p LAINEY    Migraines     Ovarian cyst     On OCP per Gyn    Overweight        Past Surgical History:   Procedure Laterality Date    SPINE SURGERY  12/2019    On record at [facility]   [address] Medications   Medication Sig Dispense Refill    cholecalciferol (VITAMIN D3) 1,000 units tablet Take 1,000 Units by mouth daily      diclofenac sodium (VOLTAREN) 1 % Apply 2 g topically 4 (four) times a day 1 Tube 1    levothyroxine 25 mcg tablet 1 pill by mouth once daily on an empty stomach, 30 minutes prior to eating food or drink  90 tablet 1    melatonin 3 mg Take 3 mg by mouth daily at bedtime 1-2 tabs by mouth QHS   methocarbamol (ROBAXIN) 500 mg tablet Take 1 tablet (500 mg total) by mouth 2 (two) times a day as needed for muscle spasms 60 tablet 1    Multiple Vitamin (MULTIVITAMINS PO) Take 1 tablet by mouth daily       Norethin-Eth Estrad-Fe Biphas (LO LOESTRIN FE) 1 MG-10 MCG / 10 MCG TABS Take 1 tablet by mouth daily Rx per Gyn      prochlorperazine (COMPAZINE) 10 mg tablet Take 1 tablet (10 mg total) by mouth every 8 (eight) hours as needed for nausea or vomiting (Migraine) 30 tablet 0    rizatriptan (MAXALT-MLT) 10 MG disintegrating tablet Take 1 tablet (10 mg total) by mouth once as needed for migraine (Max 30 mg/24 hrs ) May repeat in 2 hours if needed 9 tablet 0     No current facility-administered medications for this visit  No Known Allergies    Review of Systems   Constitutional: Negative for chills, fatigue, fever and unexpected weight change  HENT: Negative for mouth sores and trouble swallowing  Eyes: Negative for pain and visual disturbance  Respiratory: Negative for cough and shortness of breath  Cardiovascular: Negative for chest pain and leg swelling  Gastrointestinal: Negative for abdominal pain, blood in stool, constipation, diarrhea and nausea  Musculoskeletal: Positive for arthralgias and back pain  Negative for joint swelling and myalgias  Skin: Negative for color change and rash  Neurological: Negative for weakness and numbness  Hematological: Negative for adenopathy  Psychiatric/Behavioral: Negative for sleep disturbance         There were no vitals filed for this visit  Imaging:   MRI lumbar 10/2019:   IMPRESSION:   Small broad-based central protrusion type disc herniation L5-S1  No significant central or foraminal narrowing       XR left hip 2019:   Images personally reviewed in PACS and my impression is:   Normal     Labs:   Component  Latest Ref Rng & Units 12/12/2020      8:20 AM   THYROGLOBULIN AB  0 0 - 0 9 IU/mL <1 0   TSH 3RD GENERATON  0 358 - 3 740 uIU/mL 2 570   THYROID MICROSOMAL ANTIBODY  0 - 34 IU/mL 10   Thyroglobulin-RENO  1 5 - 38 5 ng/mL 22 4     Component  Latest Ref Rng & Units 10/22/2020   WBC  4 31 - 10 16 Thousand/uL 5 74   Red Blood Cell Count  3 81 - 5 12 Million/uL 4 32   Hemoglobin  11 5 - 15 4 g/dL 13 2   HCT  34 8 - 46 1 % 43 1   MCV  82 - 98 fL 100 (H)   MCH  26 8 - 34 3 pg 30 6   MCHC  31 4 - 37 4 g/dL 30 6 (L)   RDW  11 6 - 15 1 % 12 4   MPV  8 9 - 12 7 fL 8 9   Platelet Count  532 - 390 Thousands/uL 258   nRBC  /100 WBCs 0   Neutrophils %  43 - 75 % 60   Immat GRANS %  0 - 2 % 0   Lymphocytes Relative  14 - 44 % 30   Monocytes Relative  4 - 12 % 8   Eosinophils  0 - 6 % 1   Basophils Relative  0 - 1 % 1   Absolute Neutrophils  1 85 - 7 62 Thousands/µL 3 49   Immature Grans Absolute  0 00 - 0 20 Thousand/uL 0 02   Lymphocytes Absolute  0 60 - 4 47 Thousands/µL 1 72   Absolute Monocytes  0 17 - 1 22 Thousand/µL 0 45   Absolute Eosinophils  0 00 - 0 61 Thousand/µL 0 03   Basophils Absolute  0 00 - 0 10 Thousands/µL 0 03   Sodium  136 - 145 mmol/L 139   Potassium  3 5 - 5 3 mmol/L 4 4   Chloride  100 - 108 mmol/L 105   CO2  21 - 32 mmol/L 25   Anion Gap  4 - 13 mmol/L 9   BUN  5 - 25 mg/dL 11   Creatinine  0 60 - 1 30 mg/dL 1 09   GLUCOSE FASTING  65 - 99 mg/dL 88   Calcium  8 3 - 10 1 mg/dL 9 0   AST  5 - 45 U/L 19   ALT  12 - 78 U/L 32   Alkaline Phosphatase  46 - 116 U/L 50   Total Protein  6 4 - 8 2 g/dL 7 5   Albumin  3 5 - 5 0 g/dL 3 9   TOTAL BILIRUBIN  0 20 - 1 00 mg/dL 0 37   eGFR  ml/min/1 73sq m 69 HIV-1/2 AB-AG  Non-Reactive Non-Reactive   HEPATITIS C ANTIBODY  Non-reactive Non-reactive   RHEUMATOID FACTOR  Negative Negative   Vit D, 25-Hydroxy  30 0 - 100 0 ng/mL 41 0   ANTI-NUCLEAR ANTIBODY (SANGITA)  Negative Negative   C-REACTIVE PROTEIN  <3 0 mg/L 63 3 (H)   CYCLIC CITRULLINATED PEPTIDE ANTIBODY  0 - 19 units 6   URIC ACID  2 0 - 6 8 mg/dL 4 0   Vitamin B-12  100 - 900 pg/mL 363         I spent 20 minutes with patient today in which greater than 50% of the time was spent in counseling/coordination of care regarding diagnosis, treatment options, diagnostic plan total time spent on patient encounter 35 minutes  VIRTUAL VISIT DISCLAIMER    Giorgio Larry acknowledges that she has consented to an online visit or consultation  She understands that the online visit is based solely on information provided by her, and that, in the absence of a face-to-face physical evaluation by the physician, the diagnosis she receives is both limited and provisional in terms of accuracy and completeness  This is not intended to replace a full medical face-to-face evaluation by the physician  Giorgio Larry understands and accepts these terms

## 2021-02-25 DIAGNOSIS — E03.9 HYPOTHYROIDISM, UNSPECIFIED TYPE: ICD-10-CM

## 2021-02-25 RX ORDER — LEVOTHYROXINE SODIUM 0.03 MG/1
TABLET ORAL
Qty: 90 TABLET | Refills: 1 | Status: CANCELLED | OUTPATIENT
Start: 2021-02-25

## 2021-02-25 NOTE — TELEPHONE ENCOUNTER
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

## 2021-02-25 NOTE — TELEPHONE ENCOUNTER
Patient currently has enough Rx refills until 6/17/21  She will have to talk to her pharmacy / insurance about getting a 90-day refill early for deployment reasons if her request was previously denied  Return in about 6 months (around 4/21/2021) for 6mo - Migraines, Myalgias, Obesity, No Labs

## 2021-03-10 DIAGNOSIS — Z23 ENCOUNTER FOR IMMUNIZATION: ICD-10-CM

## 2021-03-12 ENCOUNTER — IMMUNIZATIONS (OUTPATIENT)
Dept: FAMILY MEDICINE CLINIC | Facility: HOSPITAL | Age: 31
End: 2021-03-12

## 2021-03-12 DIAGNOSIS — Z23 ENCOUNTER FOR IMMUNIZATION: Primary | ICD-10-CM

## 2021-03-12 PROCEDURE — 91300 SARS-COV-2 / COVID-19 MRNA VACCINE (PFIZER-BIONTECH) 30 MCG: CPT

## 2021-03-12 PROCEDURE — 0001A SARS-COV-2 / COVID-19 MRNA VACCINE (PFIZER-BIONTECH) 30 MCG: CPT

## 2021-04-03 ENCOUNTER — IMMUNIZATIONS (OUTPATIENT)
Dept: FAMILY MEDICINE CLINIC | Facility: HOSPITAL | Age: 31
End: 2021-04-03

## 2021-04-03 DIAGNOSIS — Z23 ENCOUNTER FOR IMMUNIZATION: Primary | ICD-10-CM

## 2021-04-03 PROCEDURE — 0002A SARS-COV-2 / COVID-19 MRNA VACCINE (PFIZER-BIONTECH) 30 MCG: CPT

## 2021-04-03 PROCEDURE — 91300 SARS-COV-2 / COVID-19 MRNA VACCINE (PFIZER-BIONTECH) 30 MCG: CPT

## 2021-05-19 ENCOUNTER — OFFICE VISIT (OUTPATIENT)
Dept: FAMILY MEDICINE CLINIC | Facility: CLINIC | Age: 31
End: 2021-05-19
Payer: COMMERCIAL

## 2021-05-19 VITALS — HEART RATE: 78 BPM | WEIGHT: 197 LBS | BODY MASS INDEX: 30.92 KG/M2 | HEIGHT: 67 IN

## 2021-05-19 DIAGNOSIS — G43.009 MIGRAINE WITHOUT AURA AND WITHOUT STATUS MIGRAINOSUS, NOT INTRACTABLE: ICD-10-CM

## 2021-05-19 DIAGNOSIS — R05.9 COUGH: ICD-10-CM

## 2021-05-19 DIAGNOSIS — E03.9 HYPOTHYROIDISM, UNSPECIFIED TYPE: ICD-10-CM

## 2021-05-19 DIAGNOSIS — E78.5 HYPERLIPIDEMIA, UNSPECIFIED HYPERLIPIDEMIA TYPE: ICD-10-CM

## 2021-05-19 DIAGNOSIS — E66.9 CLASS 1 OBESITY WITHOUT SERIOUS COMORBIDITY WITH BODY MASS INDEX (BMI) OF 30.0 TO 30.9 IN ADULT, UNSPECIFIED OBESITY TYPE: Primary | ICD-10-CM

## 2021-05-19 DIAGNOSIS — B34.9 VIRAL INFECTION, UNSPECIFIED: ICD-10-CM

## 2021-05-19 PROCEDURE — U0005 INFEC AGEN DETEC AMPLI PROBE: HCPCS | Performed by: FAMILY MEDICINE

## 2021-05-19 PROCEDURE — 1036F TOBACCO NON-USER: CPT | Performed by: FAMILY MEDICINE

## 2021-05-19 PROCEDURE — U0003 INFECTIOUS AGENT DETECTION BY NUCLEIC ACID (DNA OR RNA); SEVERE ACUTE RESPIRATORY SYNDROME CORONAVIRUS 2 (SARS-COV-2) (CORONAVIRUS DISEASE [COVID-19]), AMPLIFIED PROBE TECHNIQUE, MAKING USE OF HIGH THROUGHPUT TECHNOLOGIES AS DESCRIBED BY CMS-2020-01-R: HCPCS | Performed by: FAMILY MEDICINE

## 2021-05-19 PROCEDURE — 99213 OFFICE O/P EST LOW 20 MIN: CPT | Performed by: FAMILY MEDICINE

## 2021-05-19 PROCEDURE — 3008F BODY MASS INDEX DOCD: CPT | Performed by: FAMILY MEDICINE

## 2021-05-19 PROCEDURE — 3725F SCREEN DEPRESSION PERFORMED: CPT | Performed by: FAMILY MEDICINE

## 2021-05-19 RX ORDER — DEXTROMETHORPHAN HYDROBROMIDE AND PROMETHAZINE HYDROCHLORIDE 15; 6.25 MG/5ML; MG/5ML
5 SOLUTION ORAL 4 TIMES DAILY PRN
Qty: 118 ML | Refills: 0 | Status: SHIPPED | OUTPATIENT
Start: 2021-05-19 | End: 2021-05-29

## 2021-05-19 RX ORDER — ALBUTEROL SULFATE 90 UG/1
2 AEROSOL, METERED RESPIRATORY (INHALATION) EVERY 4 HOURS PRN
Qty: 8 G | Refills: 0 | Status: SHIPPED | OUTPATIENT
Start: 2021-05-19 | End: 2021-05-29

## 2021-05-19 RX ORDER — BENZONATATE 100 MG/1
100 CAPSULE ORAL 3 TIMES DAILY PRN
Qty: 30 CAPSULE | Refills: 0 | Status: SHIPPED | OUTPATIENT
Start: 2021-05-19 | End: 2021-05-29

## 2021-05-19 RX ORDER — RIZATRIPTAN BENZOATE 10 MG/1
10 TABLET, ORALLY DISINTEGRATING ORAL ONCE AS NEEDED
Qty: 9 TABLET | Refills: 0 | Status: SHIPPED | OUTPATIENT
Start: 2021-05-19 | End: 2021-07-26 | Stop reason: SDUPTHER

## 2021-05-19 NOTE — PATIENT INSTRUCTIONS
[address]    Your healthcare provider and/or public health staff have evaluated you and have determined that you do not need to remain in the hospital at this time  At this time you can be isolated at home where you will be monitored by staff from your local or state health department  You should carefully follow the prevention and isolation steps below until a healthcare provider or local or state health department says that you can return to your normal activities  Stay home except to get medical care    People who are mildly ill with COVID-19 are able to isolate at home during their illness  You should restrict activities outside your home, except for getting medical care  Do not go to work, school, or public areas  Avoid using public transportation, ride-sharing, or taxis  Separate yourself from other people and animals in your home    People: As much as possible, you should stay in a specific room and away from other people in your home  Also, you should use a separate bathroom, if available  Animals: You should restrict contact with pets and other animals while you are sick with COVID-19, just like you would around other people  Although there have not been reports of pets or other animals becoming sick with COVID-19, it is still recommended that people sick with COVID-19 limit contact with animals until more information is known about the virus  When possible, have another member of your household care for your animals while you are sick  If you are sick with COVID-19, avoid contact with your pet, including petting, snuggling, being kissed or licked, and sharing food  If you must care for your pet or be around animals while you are sick, wash your hands before and after you interact with pets and wear a facemask  See COVID-19 and Animals for more information      Call ahead before visiting your doctor    If you have a medical appointment, call the healthcare provider and tell them that you have or may have COVID-19  This will help the healthcare providers office take steps to keep other people from getting infected or exposed  Wear a facemask    You should wear a facemask when you are around other people (e g , sharing a room or vehicle) or pets and before you enter a healthcare providers office  If you are not able to wear a facemask (for example, because it causes trouble breathing), then people who live with you should not stay in the same room with you, or they should wear a facemask if they enter your room  Cover your coughs and sneezes    Cover your mouth and nose with a tissue when you cough or sneeze  Throw used tissues in a lined trash can  Immediately wash your hands with soap and water for at least 20 seconds or, if soap and water are not available, clean your hands with an alcohol-based hand  that contains at least 60% alcohol  Clean your hands often    Wash your hands often with soap and water for at least 20 seconds, especially after blowing your nose, coughing, or sneezing; going to the bathroom; and before eating or preparing food  If soap and water are not readily available, use an alcohol-based hand  with at least 60% alcohol, covering all surfaces of your hands and rubbing them together until they feel dry  Soap and water are the best option if hands are visibly dirty  Avoid touching your eyes, nose, and mouth with unwashed hands  Avoid sharing personal household items    You should not share dishes, drinking glasses, cups, eating utensils, towels, or bedding with other people or pets in your home  After using these items, they should be washed thoroughly with soap and water  Clean all high-touch surfaces everyday    High touch surfaces include counters, tabletops, doorknobs, bathroom fixtures, toilets, phones, keyboards, tablets, and bedside tables  Also, clean any surfaces that may have blood, stool, or body fluids on them   Use a household cleaning spray or wipe, according to the label instructions  Labels contain instructions for safe and effective use of the cleaning product including precautions you should take when applying the product, such as wearing gloves and making sure you have good ventilation during use of the product  Monitor your symptoms    Seek prompt medical attention if your illness is worsening (e g , difficulty breathing)  Before seeking care, call your healthcare provider and tell them that you have, or are being evaluated for, COVID-19  Put on a facemask before you enter the facility  These steps will help the healthcare providers office to keep other people in the office or waiting room from getting infected or exposed  Ask your healthcare provider to call the local or [facility] health department  Persons who are placed under active monitoring or facilitated self-monitoring should follow instructions provided by their local health department or occupational health professionals, as appropriate  If you have a medical emergency and need to call 911, notify the dispatch personnel that you have, or are being evaluated for COVID-19  If possible, put on a facemask before emergency medical services arrive      Discontinuing home isolation    Patients with confirmed COVID-19 should remain under home isolation precautions until the following conditions are met:   - They have had no fever for at least 24 hours (that is one full day of no fever without the use medicine that reduces fevers)  AND  - other symptoms have improved (for example, when their cough or shortness of breath have improved)  AND  - If had mild or moderate illness, at least 10 days have passed since their symptoms first appeared or if severe illness (needed oxygen) or immunosuppressed, at least 20 days have passed since symptoms first appeared  Patients with confirmed COVID-19 should also notify close contacts (including their workplace) and ask that they self-quarantine  Currently, close contact is defined as being within 6 feet for 15 minutes or more from the period 24 hours starting 48 hours before symptom onset to the time at which the patient went into isolation  Close contacts of patients diagnosed with COVID-19 should be instructed by the patient to self-quarantine for 14 days from the last time of their last contact with the patient  Source: [employer]    COVID TESTING TRIAGE:    Other Screening: (Travel, Work, School, etc  with no known direct exposure to [address]) - Refer to [employer] ([facility], [facility], [employer]) Call ahead to see if these locations provide these services  In an effort to best serve our community in this time of increased COVID activity, [facility] will only be providing COVID testing for those individuals who are symptomatic or have had a direct exposure to a COVID case  Unfortunately, due to resource constraints we are unable to provide testing for asymptomatic individuals who need a swab for clearance to travel, or return to work or school  Please note that [facility] Employee's in need of testing for return to work in a Network position can continue to obtain testing through this hotline  Thank you in advance for your understanding and cooperation during these challenging times  Symptomatic Patient or Exposed Patient(Close Contact with COVID + Person):  Testing Sites:   [facility]Address: Juan José [address] [place] [address]  oHours: Monday-Friday 8:00A - 5:00P  [address] [place] ([facility])oAddress: [address] [facility] [place]Port [place], [place] [address]ZGHQPE: Monday-Friday 8:00A - 5:00P   [address] [facility])oAddress: [address] [facility] [place], [place] [address]ENYFUT: Monday-Friday 8:00A - 5:00P  [address]Address: [address], [place] [address]z Hours: Monday-Friday 8:00A - 5:00P   [employer] [facility]Address: [address], [place], [place] [address]CZEOIT: Monday-Friday 8:00A - 5:00P & Saturday 8:00A - 1:00P   [facility]  [employer] oAddress: [address] [place]  [address](located in the parking lot behind the building)oHours: Monday-Friday 8:00A - 1:00P   [address] [place] [address] oAddress: [address] [facility], [place], [place]  [address]eFfjgj: Monday-Friday 10:00A - 2:00P   [facility] Sites:  [address] Now [address] [facility]) oAddress: [address], [facility], [facility], [address]: Monday-Friday 7:30A - 10:30P & Sat/Sun 8:00A - 8:00P   [facility] (Counts include 234 beds at the [facility])oAddress: [address] [place] [address]cVucsa: Monday-Friday 7:30A - 10:30P & Sat/Sun 8A - 8P  [address] Now- [place]oAddress: [address] [facility] [address]lPmwfn: Monday-Friday 8:00A - 8:00P & Sat/Sun 8:00A - 4:00P  [address] ([address] [facility],6Th Floor Center)oAddress: [address], [address]: Monday-Friday 8:00A - 8:00P & Sat/Sun 8:00A - 4:00P  [address] Now- [place] oAddress: [address] ([address]) Yang Phillipst: Monday-Friday 8:00A - 8:00P  [facility] Now-[place]orksoAddress: [address] [place], [place] [address]PPZKGY: Monday-Friday 8:00A-8:00P & Sat/Sun 8:00A - 4:00P  [address] Now- [place]goAddress: [address] [facility] [address]xTmosc: Monday-Friday 8:00A - 8:00P & Sat/Sun 8:00A - 4:00P  [address], [place], [place] [address]TVCPUD: Monday-Friday 8:00A - 8:00P  [address] Now- [place]oAddress: [place], [place] [address]kJmxxn: 7 days a week 8:00A - 8:00P    [address] [facility] Now- [place]oAddress: [facility] [address] [address]cBvylh: Monday-Friday 8:00A - 8:00P  [address] Now- [facility]Address: [address] [place], [place] [address]mCdftf: Monday-Friday 8:00A - 8:00P & Sat/Sun 8:00A - 4:00P     [facility])oAddress: [address] [place], [place] [address]WEAFVS: Monday-Friday 8:00A - 8:00P  [address] Now- [place]Address: [address] [place], [place] [address]XFYIIO: Monday-Friday 8:00A - 8:00P & Sat/Sun 8:00A - 4:00PS  [facility]oAddress: [address] [place], [place] [address]APQEYO: 7 days a week 8:00A - 8:00P   [address] Now- [facility]Address: [address], [facility], [place] [address]KSKLLY: 7 days a week 8:00A - 8:00P  [address], [place], [place] [address]HKNBXJ: Monday-Friday 7:30A - 10:30P & Sat/Sun 8:00A - 8:00P

## 2021-05-19 NOTE — PROGRESS NOTES
COVID-19 Outpatient Progress Note    Assessment/Plan:    Problem List Items Addressed This Visit        Endocrine    Hypothyroid    Relevant Orders    TSH, 3rd generation with Free T4 reflex       Cardiovascular and Mediastinum    Migraines    Relevant Medications    rizatriptan (MAXALT-MLT) 10 MG disintegrating tablet    Other Relevant Orders    Magnesium       Other    Class 1 obesity without serious comorbidity with body mass index (BMI) of 30 0 to 30 9 in adult - Primary    Relevant Orders    TSH, 3rd generation with Free T4 reflex    Hyperlipidemia    Relevant Orders    Comprehensive metabolic panel    TSH, 3rd generation with Free T4 reflex      Other Visit Diagnoses     Viral infection, unspecified        Relevant Medications    albuterol (PROVENTIL HFA,VENTOLIN HFA) 90 mcg/act inhaler    benzonatate (TESSALON PERLES) 100 mg capsule    Promethazine-DM (PHENERGAN-DM) 6 25-15 mg/5 mL oral syrup    Other Relevant Orders    Novel Coronavirus (Covid-19),PCR [facility] - Collected at   [address] or [facility]    Cough        Relevant Medications    albuterol (PROVENTIL HFA,VENTOLIN HFA) 90 mcg/act inhaler    benzonatate (TESSALON PERLES) 100 mg capsule    Promethazine-DM (PHENERGAN-DM) 6 25-15 mg/5 mL oral syrup         Disposition:     I referred patient to one of our centralized sites for a COVID-19 swab  Pending labs  Should remain in quarantine until further notice  If her COVID-19 test is negative, she can discontinue quarantine  If her COVID-19 test is positive, she should complete 10 days of isolation through Thursday, 05/20/2021, at the earliest ending on Friday, 05/21/2021  Patient received her 2nd COVID vaccine 4/3/21  Advise Adali Knowles med sinus rinse kit, Mucinex, Claritin/Zyrtec/Allegra, Flonase  Avoid decongestants if you have high blood pressure  Start Albuterol HFA PRN, Tessalon Perles PRN, Prometh DM PRN - side effects discussed  I have spent 25 minutes directly with the patient  Greater than 50% of this time was spent in counseling/coordination of care regarding: prognosis, instructions for management, patient and family education, importance of treatment compliance, risk factor reductions and impressions  Encounter provider Joanie Calles DO    Provider located at [address] [place] [address] Road,[address]Th Floor  [address]  [facility] [address] [phone]    Recent Visits  No visits were found meeting these conditions  Showing recent visits within past 7 days and meeting all other requirements     Today's Visits  Date Type Provider Dept   05/19/21 Office Visit Marylin Barron DO [facility] today's visits and meeting all other requirements     Future Appointments  No visits were found meeting these conditions  Showing future appointments within next 150 days and meeting all other requirements      This virtual check-in was done via [employer] and patient was informed that this is a secure, HIPAA-compliant platform  She agrees to proceed  Patient agrees to participate in a virtual check in via telephone or video visit instead of presenting to the office to address urgent/immediate medical needs  Patient is aware this is a billable service  After connecting through [facility], the patient was identified by name and date of birth  Jeanna Segura was informed that this was a telemedicine visit and that the exam was being conducted confidentially over secure lines  My office door was closed  No one else was in the room  Jeanna Segura acknowledged consent and understanding of privacy and security of the telemedicine visit  I informed the patient that I have reviewed her record in Epic and presented the opportunity for her to ask any questions regarding the visit today  The patient agreed to participate  Subjective:   Jeanna Segura is a 27 y o  female who is concerned about COVID-19   Patient's symptoms include fatigue, nasal congestion, rhinorrhea, sore throat (Due to cough), cough (Productive), chest tightness (Occurs at the end of the day or at night), abdominal pain and nausea  Patient denies fever, chills, anosmia, loss of taste, shortness of breath, vomiting, diarrhea, myalgias and headaches  Date of symptom onset: 5/10/2021    Exposure:   Contact with a person who is under investigation (PUI) for or who is positive for COVID-19 within the last 14 days?: No    Hospitalized recently for fever and/or lower respiratory symptoms?: No      Currently a healthcare worker that is involved in direct patient care?: No      Works in a special setting where the risk of COVID-19 transmission may be high? (this may include long-term care, correctional and FPC facilities; homeless shelters; assisted-living facilities and group homes ): No      Resident in a special setting where the risk of COVID-19 transmission may be high? (this may include long-term care, correctional and FPC facilities; homeless shelters; assisted-living facilities and group homes ): No      Patient thinks Symptoms are worse at night  B/L Ear and Sinus pressure  Insomnia due to cough - only has slept 3 hours per night  Using Mucinex x few days, but stopped because label said to not take with thyroid medication  She has been using Tylenol        No results found for: [address] [facility], [address] [facility], [address] [facility], [place] [address]  Past Medical History:   Diagnosis Date    Anemia 2005    Arthritis 2005    Class 1 obesity without serious comorbidity with body mass index (BMI) of 30 0 to 30 9 in adult     Dysmenorrhea     Hyperlipidemia     Hypothyroid     Iron deficiency anemia     Lumbar herniated disc     s/p LAINEY    Migraines     Ovarian cyst     On OCP per Gyn    Overweight      Past Surgical History:   Procedure Laterality Date    SPINE SURGERY  12/2019    On record at [address] EXTRACTION       Current Outpatient Medications   Medication Sig Dispense Refill    cholecalciferol (VITAMIN D3) 1,000 units tablet Take 1,000 Units by mouth daily      diclofenac sodium (VOLTAREN) 1 % Apply 2 g topically 4 (four) times a day 1 Tube 1    levothyroxine 25 mcg tablet 1 pill by mouth once daily on an empty stomach, 30 minutes prior to eating food or drink  90 tablet 1    melatonin 3 mg Take 3 mg by mouth daily at bedtime 1-2 tabs by mouth QHS   methocarbamol (ROBAXIN) 500 mg tablet Take 1 tablet (500 mg total) by mouth 2 (two) times a day as needed for muscle spasms 60 tablet 1    Multiple Vitamin (MULTIVITAMINS PO) Take 1 tablet by mouth daily       Norethin-Eth Estrad-Fe Biphas (LO LOESTRIN FE) 1 MG-10 MCG / 10 MCG TABS Take 1 tablet by mouth daily Rx per Gyn      prochlorperazine (COMPAZINE) 10 mg tablet Take 1 tablet (10 mg total) by mouth every 8 (eight) hours as needed for nausea or vomiting (Migraine) 30 tablet 0    rizatriptan (MAXALT-MLT) 10 MG disintegrating tablet Take 1 tablet (10 mg total) by mouth once as needed for migraine (Max 30 mg/24 hrs ) May repeat in 2 hours if needed 9 tablet 0    albuterol (PROVENTIL HFA,VENTOLIN HFA) 90 mcg/act inhaler Inhale 2 puffs every 4 (four) hours as needed for wheezing or shortness of breath for up to 10 days 8 g 0    benzonatate (TESSALON PERLES) 100 mg capsule Take 1 capsule (100 mg total) by mouth 3 (three) times a day as needed for cough for up to 10 days 30 capsule 0    Promethazine-DM (PHENERGAN-DM) 6 25-15 mg/5 mL oral syrup Take 5 mL by mouth 4 (four) times a day as needed for cough for up to 10 days 118 mL 0     No current facility-administered medications for this visit  Allergies   Allergen Reactions    Pollen Extract Sneezing     Itchy eyes       Review of Systems   Constitutional: Positive for fatigue  Negative for chills and fever  HENT: Positive for congestion, ear pain, postnasal drip, rhinorrhea and sore throat (Due to cough)      Respiratory: Positive for cough (Productive) and chest tightness (Occurs at the end of the day or at night)  Negative for shortness of breath and wheezing  Cardiovascular: Negative for chest pain  Gastrointestinal: Positive for abdominal pain and nausea  Negative for diarrhea and vomiting  Musculoskeletal: Negative for myalgias  Skin: Negative for rash  Neurological: Negative for headaches  Objective:    Vitals:    05/19/21 1142   Pulse: 78   Weight: 89 4 kg (197 lb)   Height: 5' 7" (1 702 m)       Physical Exam  Vitals signs and nursing note reviewed  Constitutional:       General: She is not in acute distress  Appearance: Normal appearance  She is well-developed  She is obese  HENT:      Head: Normocephalic and atraumatic  Right Ear: External ear normal       Left Ear: External ear normal       Nose: Nose normal       Mouth/Throat:      Mouth: Mucous membranes are moist       Pharynx: Oropharynx is clear  Eyes:      Extraocular Movements: Extraocular movements intact  Conjunctiva/sclera: Conjunctivae normal    Neck:      Musculoskeletal: Normal range of motion  Cardiovascular:      Rate and Rhythm: Normal rate  Pulmonary:      Effort: Pulmonary effort is normal  No respiratory distress  Abdominal:      Palpations: Abdomen is soft  Tenderness: There is abdominal tenderness (Mild) in the right upper quadrant and left upper quadrant  There is no guarding or rebound  Musculoskeletal:         General: No swelling  Right lower leg: No edema  Left lower leg: No edema  Skin:     General: Skin is warm and dry  Neurological:      General: No focal deficit present  Mental Status: She is alert and oriented to person, place, and time  Psychiatric:         Mood and Affect: Mood normal        VIRTUAL VISIT DISCLAIMER    Ana Bond acknowledges that she has consented to an online visit or consultation   She understands that the online visit is based solely on information provided by her, and that, in the absence of a face-to-face physical evaluation by the physician, the diagnosis she receives is both limited and provisional in terms of accuracy and completeness  This is not intended to replace a full medical face-to-face evaluation by the physician  Adán Leal understands and accepts these terms  Answers for HPI/ROS submitted by the patient on 5/19/2021   Cough  Chronicity: new  Onset: in the past 7 days  Progression since onset: gradually worsening  Frequency: every few minutes  Cough characteristics: productive of sputum  ear congestion: Yes  heartburn: Yes  hemoptysis: No  nasal congestion: Yes  sweats:  No  weight loss: No  Aggravated by: exercise, lying down

## 2021-05-20 DIAGNOSIS — E03.9 HYPOTHYROIDISM, UNSPECIFIED TYPE: ICD-10-CM

## 2021-05-20 LAB — SARS-COV-2 RNA RESP QL NAA+PROBE: NEGATIVE

## 2021-05-20 RX ORDER — LEVOTHYROXINE SODIUM 0.03 MG/1
TABLET ORAL
Qty: 90 TABLET | Refills: 1 | OUTPATIENT
Start: 2021-05-20

## 2021-05-21 NOTE — RESULT ENCOUNTER NOTE
As her COVID-19 test is negative, she can discontinue quarantine  Message sent to patient via [employer] patient portal   Nurse to also call patient

## 2021-06-08 DIAGNOSIS — R05.9 COUGH: ICD-10-CM

## 2021-06-08 DIAGNOSIS — G43.009 MIGRAINE WITHOUT AURA AND WITHOUT STATUS MIGRAINOSUS, NOT INTRACTABLE: ICD-10-CM

## 2021-06-08 DIAGNOSIS — B34.9 VIRAL INFECTION, UNSPECIFIED: ICD-10-CM

## 2021-06-08 RX ORDER — ALBUTEROL SULFATE 90 UG/1
2 AEROSOL, METERED RESPIRATORY (INHALATION) EVERY 4 HOURS PRN
OUTPATIENT
Start: 2021-06-08 | End: 2021-06-18

## 2021-06-08 RX ORDER — RIZATRIPTAN BENZOATE 10 MG/1
TABLET, ORALLY DISINTEGRATING ORAL
Qty: 9 TABLET | Refills: 0 | OUTPATIENT
Start: 2021-06-08

## 2021-06-18 DIAGNOSIS — E03.9 HYPOTHYROIDISM, UNSPECIFIED TYPE: ICD-10-CM

## 2021-06-18 RX ORDER — LEVOTHYROXINE SODIUM 0.03 MG/1
TABLET ORAL
Qty: 90 TABLET | Refills: 0 | Status: SHIPPED | OUTPATIENT
Start: 2021-06-18 | End: 2021-07-26 | Stop reason: SDUPTHER

## 2021-06-18 NOTE — TELEPHONE ENCOUNTER
Patient is due for appt and labs  Please schedule:       Return in about 6 weeks (around 6/30/2021) for 6mo - Hypothyroid, Migraines, Myalgias, Obesity, Labs  eRx sent

## 2021-06-18 NOTE — TELEPHONE ENCOUNTER
Patient cannot schedule follow up at this time, she has been deployed to [place] and as of now the plan to return home is around [place]    As soon as she gets home she will have her labs done and schedule her follow up

## 2021-06-18 NOTE — TELEPHONE ENCOUNTER
Medication Refill Requested: Levothyroxine 25mcg   Last Medication Refilled: 12/17/2020  Last office Appt:5/19/2021  Next office Appt:None   Pharmacy: [facility] [place]

## 2021-07-24 ENCOUNTER — LAB (OUTPATIENT)
Dept: LAB | Age: 31
End: 2021-07-24
Payer: COMMERCIAL

## 2021-07-24 DIAGNOSIS — G43.009 MIGRAINE WITHOUT AURA AND WITHOUT STATUS MIGRAINOSUS, NOT INTRACTABLE: ICD-10-CM

## 2021-07-24 DIAGNOSIS — E78.5 HYPERLIPIDEMIA, UNSPECIFIED HYPERLIPIDEMIA TYPE: ICD-10-CM

## 2021-07-24 DIAGNOSIS — E03.9 MYXEDEMA HEART DISEASE: ICD-10-CM

## 2021-07-24 DIAGNOSIS — I51.9 MYXEDEMA HEART DISEASE: ICD-10-CM

## 2021-07-24 DIAGNOSIS — E66.9 OBESITY, UNSPECIFIED CLASSIFICATION, UNSPECIFIED OBESITY TYPE, UNSPECIFIED WHETHER SERIOUS COMORBIDITY PRESENT: ICD-10-CM

## 2021-07-24 LAB
ALBUMIN SERPL BCP-MCNC: 3.3 G/DL (ref 3.5–5)
ALP SERPL-CCNC: 59 U/L (ref 46–116)
ALT SERPL W P-5'-P-CCNC: 17 U/L (ref 12–78)
ANION GAP SERPL CALCULATED.3IONS-SCNC: 3 MMOL/L (ref 4–13)
AST SERPL W P-5'-P-CCNC: 14 U/L (ref 5–45)
BILIRUB SERPL-MCNC: 0.39 MG/DL (ref 0.2–1)
BUN SERPL-MCNC: 12 MG/DL (ref 5–25)
CALCIUM ALBUM COR SERPL-MCNC: 9.4 MG/DL (ref 8.3–10.1)
CALCIUM SERPL-MCNC: 8.8 MG/DL (ref 8.3–10.1)
CHLORIDE SERPL-SCNC: 110 MMOL/L (ref 100–108)
CO2 SERPL-SCNC: 24 MMOL/L (ref 21–32)
CREAT SERPL-MCNC: 0.87 MG/DL (ref 0.6–1.3)
CRP SERPL QL: 10 MG/L
ERYTHROCYTE [SEDIMENTATION RATE] IN BLOOD: 11 MM/HOUR (ref 0–19)
GFR SERPL CREATININE-BSD FRML MDRD: 90 ML/MIN/1.73SQ M
GLUCOSE P FAST SERPL-MCNC: 82 MG/DL (ref 65–99)
MAGNESIUM SERPL-MCNC: 2.2 MG/DL (ref 1.6–2.6)
POTASSIUM SERPL-SCNC: 4.2 MMOL/L (ref 3.5–5.3)
PROT SERPL-MCNC: 7.2 G/DL (ref 6.4–8.2)
SODIUM SERPL-SCNC: 137 MMOL/L (ref 136–145)
TSH SERPL DL<=0.05 MIU/L-ACNC: 3.02 UIU/ML (ref 0.36–3.74)

## 2021-07-24 PROCEDURE — 86140 C-REACTIVE PROTEIN: CPT

## 2021-07-24 PROCEDURE — 85652 RBC SED RATE AUTOMATED: CPT

## 2021-07-24 PROCEDURE — 84443 ASSAY THYROID STIM HORMONE: CPT

## 2021-07-24 PROCEDURE — 80053 COMPREHEN METABOLIC PANEL: CPT

## 2021-07-24 PROCEDURE — 81374 HLA I TYPING 1 ANTIGEN LR: CPT

## 2021-07-24 PROCEDURE — 36415 COLL VENOUS BLD VENIPUNCTURE: CPT

## 2021-07-24 PROCEDURE — 83735 ASSAY OF MAGNESIUM: CPT

## 2021-07-24 PROCEDURE — 86235 NUCLEAR ANTIGEN ANTIBODY: CPT

## 2021-07-26 ENCOUNTER — OFFICE VISIT (OUTPATIENT)
Dept: FAMILY MEDICINE CLINIC | Facility: CLINIC | Age: 31
End: 2021-07-26
Payer: COMMERCIAL

## 2021-07-26 VITALS
TEMPERATURE: 98.4 F | HEIGHT: 67 IN | OXYGEN SATURATION: 97 % | RESPIRATION RATE: 14 BRPM | DIASTOLIC BLOOD PRESSURE: 70 MMHG | BODY MASS INDEX: 31.55 KG/M2 | WEIGHT: 201 LBS | SYSTOLIC BLOOD PRESSURE: 108 MMHG | HEART RATE: 81 BPM

## 2021-07-26 DIAGNOSIS — R19.7 DIARRHEA, UNSPECIFIED TYPE: ICD-10-CM

## 2021-07-26 DIAGNOSIS — N94.6 DYSMENORRHEA: ICD-10-CM

## 2021-07-26 DIAGNOSIS — D50.9 IRON DEFICIENCY ANEMIA, UNSPECIFIED IRON DEFICIENCY ANEMIA TYPE: ICD-10-CM

## 2021-07-26 DIAGNOSIS — Z13.6 SCREENING FOR CARDIOVASCULAR CONDITION: ICD-10-CM

## 2021-07-26 DIAGNOSIS — M79.10 MYALGIA: ICD-10-CM

## 2021-07-26 DIAGNOSIS — E78.5 HYPERLIPIDEMIA, UNSPECIFIED HYPERLIPIDEMIA TYPE: Primary | ICD-10-CM

## 2021-07-26 DIAGNOSIS — M51.16 LUMBAR DISC DISEASE WITH RADICULOPATHY: ICD-10-CM

## 2021-07-26 DIAGNOSIS — R14.0 ABDOMINAL BLOATING: ICD-10-CM

## 2021-07-26 DIAGNOSIS — G43.009 MIGRAINE WITHOUT AURA AND WITHOUT STATUS MIGRAINOSUS, NOT INTRACTABLE: ICD-10-CM

## 2021-07-26 DIAGNOSIS — E66.9 CLASS 1 OBESITY WITHOUT SERIOUS COMORBIDITY WITH BODY MASS INDEX (BMI) OF 31.0 TO 31.9 IN ADULT, UNSPECIFIED OBESITY TYPE: ICD-10-CM

## 2021-07-26 DIAGNOSIS — E03.9 HYPOTHYROIDISM, UNSPECIFIED TYPE: ICD-10-CM

## 2021-07-26 LAB
ENA SS-A AB SER-ACNC: <0.2 AI (ref 0–0.9)
ENA SS-B AB SER-ACNC: <0.2 AI (ref 0–0.9)

## 2021-07-26 PROCEDURE — 1036F TOBACCO NON-USER: CPT | Performed by: FAMILY MEDICINE

## 2021-07-26 PROCEDURE — 3008F BODY MASS INDEX DOCD: CPT | Performed by: FAMILY MEDICINE

## 2021-07-26 PROCEDURE — 3725F SCREEN DEPRESSION PERFORMED: CPT | Performed by: FAMILY MEDICINE

## 2021-07-26 PROCEDURE — 99214 OFFICE O/P EST MOD 30 MIN: CPT | Performed by: FAMILY MEDICINE

## 2021-07-26 RX ORDER — LEVOTHYROXINE SODIUM 0.03 MG/1
TABLET ORAL
Qty: 90 TABLET | Refills: 0 | Status: SHIPPED | OUTPATIENT
Start: 2021-07-26 | End: 2022-03-02 | Stop reason: SDUPTHER

## 2021-07-26 RX ORDER — RIZATRIPTAN BENZOATE 10 MG/1
10 TABLET, ORALLY DISINTEGRATING ORAL ONCE AS NEEDED
Qty: 9 TABLET | Refills: 0 | Status: SHIPPED | OUTPATIENT
Start: 2021-07-26 | End: 2022-04-26 | Stop reason: SDUPTHER

## 2021-07-26 RX ORDER — DICYCLOMINE HCL 20 MG
20 TABLET ORAL EVERY 6 HOURS
Qty: 120 TABLET | Refills: 1 | Status: SHIPPED | OUTPATIENT
Start: 2021-07-26 | End: 2022-04-26

## 2021-07-26 RX ORDER — LEVOTHYROXINE SODIUM 0.03 MG/1
TABLET ORAL
Qty: 90 TABLET | Refills: 0 | Status: SHIPPED | OUTPATIENT
Start: 2021-07-26 | End: 2021-07-26 | Stop reason: SDUPTHER

## 2021-07-26 NOTE — PROGRESS NOTES
Assessment/Plan:  Problem List Items Addressed This Visit        Endocrine    Hypothyroid      Stable on Synthroid 25 mcg daily  Relevant Medications    levothyroxine 25 mcg tablet    Other Relevant Orders    Ambulatory referral to Nutrition Services    TSH, 3rd generation with Free T4 reflex       Cardiovascular and Mediastinum    Migraines     Stable on Maxalt MLT 10mg PRN and Compazine 10mg PRN  For headache and migraine prevention I would suggest a combination vitamin supplement which may include 1 or more of the following ingredients:  Magnesium 400 mg , Riboflavin (vitamin B2) 400 - 600 mg,  Butterbur 150 mg (PA free)  Other ingredients that may be helpful are feverfew, coenzyme Q10 100 mg three times a day,  melatonin and christina  Some example brands that combine some of these ingredients are Migravent or Dolovent  Relevant Medications    rizatriptan (MAXALT-MLT) 10 MG disintegrating tablet    Other Relevant Orders    Ambulatory referral to Nutrition Services    Magnesium       Nervous and Auditory    Lumbar disc disease with radiculopathy     Management per Pain Management - overdue for appointment  Improved s/p radiofrequency ablation  Genitourinary    Dysmenorrhea     On OCP per Gyn  Relevant Orders    CBC and differential       Other    Class 1 obesity without serious comorbidity with body mass index (BMI) of 31 0 to 31 9 in adult     Worsening  Recommend lifestyle modifications  Relevant Orders    Ambulatory referral to Nutrition Services    TSH, 3rd generation with Free T4 reflex    Hyperlipidemia - Primary     Recommend lifestyle modifications  Relevant Orders    Ambulatory referral to Nutrition Services    CBC and differential    Comprehensive metabolic panel    Lipid panel    TSH, 3rd generation with Free T4 reflex    LDL cholesterol, direct    Iron deficiency anemia     Continue MVI and OCP c Fe             Relevant Orders Ambulatory referral to Nutrition Services    CBC and differential      Other Visit Diagnoses     Abdominal bloating        Relevant Medications    dicyclomine (BENTYL) 20 mg tablet    Other Relevant Orders    Ambulatory referral to Gastroenterology    Food Allergy Profile with Reflexes    Celiac Disease Antibody Profile    TSH, 3rd generation with Free T4 reflex    Start Bentyl 20mg QID  Advise probiotic, Gas-X, and Beano  Diarrhea, unspecified type        Relevant Medications    dicyclomine (BENTYL) 20 mg tablet    Other Relevant Orders    Ambulatory referral to Gastroenterology    Food Allergy Profile with Reflexes    Celiac Disease Antibody Profile    TSH, 3rd generation with Free T4 reflex    Start Bentyl 20mg QID  Advise probiotic, Gas-X, and Beano  Myalgia        Relevant Orders    Vitamin D 25 hydroxy    Screening for cardiovascular condition        Relevant Orders    CBC and differential    Comprehensive metabolic panel    Lipid panel    LDL cholesterol, direct           Return in about 6 months (around 1/26/2022) for  Physical / 6mo - Hypothyroid, Migraines, Myalgias, Obesity, Labs  No future appointments  Subjective:     Celina Christy is a 27 y o  female who presents today for a follow-up on her chronic medical conditions  HPI:  Chief Complaint   Patient presents with    Follow-up     review thyroid/blood work      -- Above per clinical staff and reviewed  --      HPI      Today:         Return in about 6 weeks (around 6/30/2021) for 6mo - Hypothyroid, Migraines, Myalgias, Obesity, Labs  6mo OV        Obesity - Watching diet   +Exercise - Works out for 120 minutes - Cardio, lifting, 7 days per week  Abdominal Bloating - Symptoms since 3/21  Feels bowels are irritable as some food cause her to run to the bathroom with diarrhea for a few hours, but she has not been keeping track of these meds  She has reduced her dairy intake s benefit    She is thinking about doing an elimination diet, but want food allergy testing first        Fatigue - Decreased due to increased work scheduled  Symptoms x since    H/O Fe Def Anemia - used to take oral supplements, then D/C due to GI upset and symptom improvement   On MVI and OCP contains iron        Palpitations - Improved  Feels the need to take an extra breath when she falls asleep due to an extra beat  Occurs nightly x couple months  Last saw Jayme Carty [facility] 19, 20  Stable stress test 19  Last Holter monitor 20 - Stable        Migraines - On Compazine 10mg q8 hours PRN x 1, and Maxalt-MLT 10mg PRN x 1- helpful   Improved with improvement in back pain - sleeping betting with Melatonin 3-6mg nightly  Symptoms since 15yo   Migraines 1-2 times every 4-6 weeks  She is using Maxalt MLT once for migraine, other small HA q4-6 weeks can be relieved by Excedrin  Using Excedrin PRN - helpful   Improves c sleep, ice pack, bitemporal massage   Denies aura   Occurs upon awakening in the AM or at the end of the day   Bi-temporal headache, across forhead, in band distribution   Intense pressure   Worse c movement, photophobia, phonophobia   Sometimes has associated nausea and vertigo   Sometimes lasts 6-12 hours    Pushes fluids - about 150oz water daily   Has manageable headache today   Previously on Elavil at 15yo - made her paul           PHQ-9 Depression Screening    PHQ-9:   Frequency of the following problems over the past two weeks:      Little interest or pleasure in doing things: 0 - not at all  Feeling down, depressed, or hopeless: 0 - not at all  Trouble falling or staying asleep, or sleeping too much: 1 - several days  Feeling tired or having little energy: 1 - several days  Poor appetite or overeatin - not at all  Feeling bad about yourself - or that you are a failure or have let yourself or your family down: 0 - not at all  Trouble concentrating on things, such as reading the newspaper or watching television: 0 - not at all  Moving or speaking so slowly that other people could have noticed  Or the opposite - being so fidgety or restless that you have been moving around a lot more than usual: 0 - not at all  Thoughts that you would be better off dead, or of hurting yourself in some way: 0 - not at all  PHQ-2 Score: 0  PHQ-9 Score: 2         LINDA-7 Flowsheet Screening      Most Recent Value   Over the last 2 weeks, how often have you been bothered by any of the following problems? Feeling nervous, anxious, or on edge  0   Not being able to stop or control worrying  0   Worrying too much about different things  0   Trouble relaxing  0   Being so restless that it is hard to sit still  0   Becoming easily annoyed or irritable  0   Feeling afraid as if something awful might happen  0   LINDA-7 Total Score  0          Herniated Lumbar Disc - Management per Pain Management per Dr Mando Reeves for back pain   Overdue for appt 7/20  Stable with exercise  Still feels tightness in her back  Feels as though her muscles spasm so much 1-2 days after exercise to pull her shoulder and knee cap out of joint  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  Referred to Physiatry   s/p LAINEY at 15yo in [facility] - helpful   Saw [facility] Physiatry PA 9/24/19, s/p Lumbar Spine MRI 10/5/19 -   Showed small broad-based central perfusion protrusion type disc herniation at L5/S1   No significant central foraminal narrowing, s/p facet radio-frequency ablation 1/8/20 per pain Management           Reviewed:  Labs 7/24/21, Cardio 5/9/19, Gyn 1/22/18, Physiatry 10/5/19, Pain Management 5/19/20, Rheum 2/18/21     Dysmenorrhea -[facility] Ms  Alex Oquendo PA-C [facility]   Next appt 1/19 - overdue   Skips placebo week of OCP                    The following portions of the patient's history were reviewed and updated as appropriate: allergies, current medications, past family history, past medical history, past social history, past surgical history and problem list       Review of Systems   Constitutional: Positive for fatigue  Negative for appetite change, chills, diaphoresis and fever  Respiratory: Negative for chest tightness and shortness of breath  Cardiovascular: Negative for chest pain  Gastrointestinal: Positive for abdominal distention (Bloating), abdominal pain (Bloating) and diarrhea (Intermittent)  Negative for blood in stool, constipation, nausea and vomiting  Genitourinary: Negative for dysuria  Musculoskeletal: Negative for back pain  Current Outpatient Medications   Medication Sig Dispense Refill    cholecalciferol (VITAMIN D3) 1,000 units tablet Take 1,000 Units by mouth daily      diclofenac sodium (VOLTAREN) 1 % Apply 2 g topically 4 (four) times a day 1 Tube 1    levothyroxine 25 mcg tablet 1 pill by mouth once daily on an empty stomach, 30 minutes prior to eating food or drink  90 tablet 0    melatonin 3 mg Take 3 mg by mouth daily at bedtime 1-2 tabs by mouth QHS   methocarbamol (ROBAXIN) 500 mg tablet Take 1 tablet (500 mg total) by mouth 2 (two) times a day as needed for muscle spasms 60 tablet 1    Multiple Vitamin (MULTIVITAMINS PO) Take 1 tablet by mouth daily       Norethin-Eth Estrad-Fe Biphas (LO LOESTRIN FE) 1 MG-10 MCG / 10 MCG TABS Take 1 tablet by mouth daily Rx per Gyn      prochlorperazine (COMPAZINE) 10 mg tablet Take 1 tablet (10 mg total) by mouth every 8 (eight) hours as needed for nausea or vomiting (Migraine) 30 tablet 0    rizatriptan (MAXALT-MLT) 10 MG disintegrating tablet Take 1 tablet (10 mg total) by mouth once as needed for migraine (Max 30 mg/24 hrs ) May repeat in 2 hours if needed 9 tablet 0    dicyclomine (BENTYL) 20 mg tablet Take 1 tablet (20 mg total) by mouth every 6 (six) hours 120 tablet 1     No current facility-administered medications for this visit         Objective:  /70   Pulse 81   Temp 98 4 °F (36 9 °C)   Resp 14   Ht 5' 7" (1 702 m)   Wt 91 2 kg (201 lb) SpO2 97%   BMI 31 48 kg/m²    Wt Readings from Last 3 Encounters:   07/26/21 91 2 kg (201 lb)   05/19/21 89 4 kg (197 lb)   10/21/20 89 6 kg (197 lb 9 6 oz)      BP Readings from Last 3 Encounters:   07/26/21 108/70   10/21/20 116/78   06/03/20 111/74          Physical Exam  Vitals and nursing note reviewed  Constitutional:       Appearance: Normal appearance  She is well-developed  She is obese  HENT:      Head: Normocephalic and atraumatic  Eyes:      Conjunctiva/sclera: Conjunctivae normal    Neck:      Thyroid: No thyromegaly  Cardiovascular:      Rate and Rhythm: Normal rate and regular rhythm  Pulses: Normal pulses  Heart sounds: Normal heart sounds  Pulmonary:      Effort: Pulmonary effort is normal       Breath sounds: Normal breath sounds  Abdominal:      General: Bowel sounds are normal  There is no distension  Palpations: Abdomen is soft  There is no mass  Tenderness: There is no abdominal tenderness  There is no guarding or rebound  Musculoskeletal:         General: No swelling  Cervical back: Neck supple  Right lower leg: No edema  Left lower leg: No edema  Skin:     General: Skin is warm and dry  Neurological:      General: No focal deficit present  Mental Status: She is alert and oriented to person, place, and time  Cranial Nerves: No cranial nerve deficit  Sensory: No sensory deficit  Motor: No weakness  Coordination: Coordination normal       Gait: Gait normal       Deep Tendon Reflexes: Reflexes normal    Psychiatric:         Mood and Affect: Mood normal          Behavior: Behavior normal          Thought Content:  Thought content normal          Judgment: Judgment normal          Lab Results:      Lab Results   Component Value Date    WBC 5 74 10/22/2020    HGB 13 2 10/22/2020    HCT 43 1 10/22/2020     10/22/2020    TRIG 71 10/22/2020    HDL 76 10/22/2020    LDLDIRECT 113 (H) 10/22/2020    ALT 17 07/24/2021 AST 14 07/24/2021     07/01/2014    K 4 2 07/24/2021     (H) 07/24/2021    CREATININE 0 87 07/24/2021    BUN 12 07/24/2021    CO2 24 07/24/2021    GLUF 82 07/24/2021    HGBA1C 4 8 07/22/2019     Lab Results   Component Value Date    URICACID 4 0 10/22/2020     Invalid input(s): BASENAME Vitamin D    No results found  POCT Labs      BMI Counseling: Body mass index is 31 48 kg/m²  The BMI is above normal  Nutrition recommendations include encouraging healthy choices of fruits and vegetables  Exercise recommendations include exercising 3-5 times per week  No pharmacotherapy was ordered

## 2021-07-28 LAB — HLA-B27 QL NAA+PROBE: NEGATIVE

## 2021-07-28 NOTE — ASSESSMENT & PLAN NOTE
Stable on Maxalt MLT 10mg PRN and Compazine 10mg PRN  For headache and migraine prevention I would suggest a combination vitamin supplement which may include 1 or more of the following ingredients:  Magnesium 400 mg , Riboflavin (vitamin B2) 400 - 600 mg,  Butterbur 150 mg (PA free)  Other ingredients that may be helpful are feverfew, coenzyme Q10 100 mg three times a day,  melatonin and christina  Some example brands that combine some of these ingredients are Migravent or Dolovent

## 2021-08-12 ENCOUNTER — LAB (OUTPATIENT)
Dept: LAB | Facility: CLINIC | Age: 31
End: 2021-08-12
Payer: COMMERCIAL

## 2021-08-12 DIAGNOSIS — R19.7 DIARRHEA, UNSPECIFIED TYPE: ICD-10-CM

## 2021-08-12 DIAGNOSIS — R14.0 ABDOMINAL BLOATING: ICD-10-CM

## 2021-08-12 LAB
ALLERGEN COMMENT: NORMAL
ALMOND IGE QN: <0.1 KUA/I
CASHEW NUT IGE QN: <0.1 KUA/I
CODFISH IGE QN: <0.1 KUA/I
EGG WHITE IGE QN: <0.1 KUA/I
GLUTEN IGE QN: <0.1 KUA/I
HAZELNUT IGE QN: <0.1 KUA/L
MILK IGE QN: <0.1 KUA/I
PEANUT IGE QN: <0.1 KUA/I
SALMON IGE QN: <0.1 KUA/I
SCALLOP IGE QN: <0.1 KUA/L
SESAME SEED IGE QN: <0.1 KUA/I
SHRIMP IGE QN: <0.1 KUA/L
SOYBEAN IGE QN: <0.1 KUA/I
TOTAL IGE SMQN RAST: 32.1 KU/L (ref 0–113)
TUNA IGE QN: <0.1 KUA/I
WALNUT IGE QN: <0.1 KUA/I
WHEAT IGE QN: <0.1 KUA/I

## 2021-08-12 PROCEDURE — 86003 ALLG SPEC IGE CRUDE XTRC EA: CPT

## 2021-08-12 PROCEDURE — 83516 IMMUNOASSAY NONANTIBODY: CPT

## 2021-08-12 PROCEDURE — 82785 ASSAY OF IGE: CPT

## 2021-08-12 PROCEDURE — 36415 COLL VENOUS BLD VENIPUNCTURE: CPT

## 2021-08-12 PROCEDURE — 82784 ASSAY IGA/IGD/IGG/IGM EACH: CPT

## 2021-08-12 PROCEDURE — 86255 FLUORESCENT ANTIBODY SCREEN: CPT

## 2021-08-13 LAB
ENDOMYSIUM IGA SER QL: NEGATIVE
GLIADIN PEPTIDE IGA SER-ACNC: 2 UNITS (ref 0–19)
GLIADIN PEPTIDE IGG SER-ACNC: 1 UNITS (ref 0–19)
IGA SERPL-MCNC: 220 MG/DL (ref 87–352)
TTG IGA SER-ACNC: <2 U/ML (ref 0–3)
TTG IGG SER-ACNC: <2 U/ML (ref 0–5)

## 2021-11-29 NOTE — RESULT ENCOUNTER NOTE
10 7 20 RESOLVED. Negative celiac disease testing  Continue plan of care      Message sent to patient via [employer] patient portal

## 2022-03-02 DIAGNOSIS — E03.9 HYPOTHYROIDISM, UNSPECIFIED TYPE: ICD-10-CM

## 2022-03-02 RX ORDER — LEVOTHYROXINE SODIUM 0.03 MG/1
TABLET ORAL
Qty: 90 TABLET | Refills: 0 | Status: SHIPPED | OUTPATIENT
Start: 2022-03-02 | End: 2022-04-26 | Stop reason: SDUPTHER

## 2022-03-02 NOTE — TELEPHONE ENCOUNTER
Pt also would like to know if she is due to have her thyroid lab work done so she can have this completed before her annual physical   Pt has new insurance and is no longer required to use [facility]   She will be using [facility]

## 2022-03-05 ENCOUNTER — LAB (OUTPATIENT)
Dept: LAB | Facility: CLINIC | Age: 32
End: 2022-03-05
Payer: COMMERCIAL

## 2022-03-05 DIAGNOSIS — Z13.6 SCREENING FOR CARDIOVASCULAR CONDITION: ICD-10-CM

## 2022-03-05 DIAGNOSIS — E78.5 HYPERLIPIDEMIA, UNSPECIFIED HYPERLIPIDEMIA TYPE: Primary | ICD-10-CM

## 2022-03-05 DIAGNOSIS — D50.9 IRON DEFICIENCY ANEMIA, UNSPECIFIED IRON DEFICIENCY ANEMIA TYPE: ICD-10-CM

## 2022-03-05 DIAGNOSIS — N94.6 DYSMENORRHEA: ICD-10-CM

## 2022-03-05 LAB
25(OH)D3 SERPL-MCNC: 62 NG/ML (ref 30–100)
ALBUMIN SERPL BCP-MCNC: 3.9 G/DL (ref 3.5–5)
ALP SERPL-CCNC: 57 U/L (ref 46–116)
ALT SERPL W P-5'-P-CCNC: 21 U/L (ref 12–78)
ANION GAP SERPL CALCULATED.3IONS-SCNC: 8 MMOL/L (ref 4–13)
AST SERPL W P-5'-P-CCNC: 17 U/L (ref 5–45)
BASOPHILS # BLD AUTO: 0.03 THOUSANDS/ΜL (ref 0–0.1)
BASOPHILS NFR BLD AUTO: 0 % (ref 0–1)
BILIRUB SERPL-MCNC: 0.25 MG/DL (ref 0.2–1)
BUN SERPL-MCNC: 11 MG/DL (ref 5–25)
CALCIUM SERPL-MCNC: 9.2 MG/DL (ref 8.3–10.1)
CHLORIDE SERPL-SCNC: 105 MMOL/L (ref 100–108)
CHOLEST SERPL-MCNC: 186 MG/DL
CO2 SERPL-SCNC: 26 MMOL/L (ref 21–32)
CREAT SERPL-MCNC: 0.94 MG/DL (ref 0.6–1.3)
EOSINOPHIL # BLD AUTO: 0.03 THOUSAND/ΜL (ref 0–0.61)
EOSINOPHIL NFR BLD AUTO: 0 % (ref 0–6)
ERYTHROCYTE [DISTWIDTH] IN BLOOD BY AUTOMATED COUNT: 12.6 % (ref 11.6–15.1)
GFR SERPL CREATININE-BSD FRML MDRD: 81 ML/MIN/1.73SQ M
GLUCOSE P FAST SERPL-MCNC: 86 MG/DL (ref 65–99)
HCT VFR BLD AUTO: 43.8 % (ref 34.8–46.1)
HDLC SERPL-MCNC: 62 MG/DL
HGB BLD-MCNC: 14.2 G/DL (ref 11.5–15.4)
IMM GRANULOCYTES # BLD AUTO: 0.01 THOUSAND/UL (ref 0–0.2)
IMM GRANULOCYTES NFR BLD AUTO: 0 % (ref 0–2)
LDLC SERPL CALC-MCNC: 107 MG/DL (ref 0–100)
LDLC SERPL DIRECT ASSAY-MCNC: 114 MG/DL (ref 0–100)
LYMPHOCYTES # BLD AUTO: 1.84 THOUSANDS/ΜL (ref 0.6–4.47)
LYMPHOCYTES NFR BLD AUTO: 25 % (ref 14–44)
MAGNESIUM SERPL-MCNC: 2.1 MG/DL (ref 1.6–2.6)
MCH RBC QN AUTO: 30.3 PG (ref 26.8–34.3)
MCHC RBC AUTO-ENTMCNC: 32.4 G/DL (ref 31.4–37.4)
MCV RBC AUTO: 93 FL (ref 82–98)
MONOCYTES # BLD AUTO: 0.38 THOUSAND/ΜL (ref 0.17–1.22)
MONOCYTES NFR BLD AUTO: 5 % (ref 4–12)
NEUTROPHILS # BLD AUTO: 5.06 THOUSANDS/ΜL (ref 1.85–7.62)
NEUTS SEG NFR BLD AUTO: 70 % (ref 43–75)
NONHDLC SERPL-MCNC: 124 MG/DL
NRBC BLD AUTO-RTO: 0 /100 WBCS
PLATELET # BLD AUTO: 287 THOUSANDS/UL (ref 149–390)
PMV BLD AUTO: 9.6 FL (ref 8.9–12.7)
POTASSIUM SERPL-SCNC: 4.2 MMOL/L (ref 3.5–5.3)
PROT SERPL-MCNC: 7.6 G/DL (ref 6.4–8.2)
RBC # BLD AUTO: 4.69 MILLION/UL (ref 3.81–5.12)
SODIUM SERPL-SCNC: 139 MMOL/L (ref 136–145)
TRIGL SERPL-MCNC: 86 MG/DL
TSH SERPL DL<=0.05 MIU/L-ACNC: 3.22 UIU/ML (ref 0.36–3.74)
WBC # BLD AUTO: 7.35 THOUSAND/UL (ref 4.31–10.16)

## 2022-03-05 PROCEDURE — 85025 COMPLETE CBC W/AUTO DIFF WBC: CPT

## 2022-03-05 PROCEDURE — 80053 COMPREHEN METABOLIC PANEL: CPT

## 2022-03-05 PROCEDURE — 84443 ASSAY THYROID STIM HORMONE: CPT

## 2022-03-05 PROCEDURE — 80061 LIPID PANEL: CPT

## 2022-03-05 PROCEDURE — 83721 ASSAY OF BLOOD LIPOPROTEIN: CPT

## 2022-03-05 PROCEDURE — 83735 ASSAY OF MAGNESIUM: CPT

## 2022-03-05 PROCEDURE — 36415 COLL VENOUS BLD VENIPUNCTURE: CPT

## 2022-03-05 PROCEDURE — 82306 VITAMIN D 25 HYDROXY: CPT

## 2022-03-06 NOTE — RESULT ENCOUNTER NOTE
Stable labs - will review with patient at upcoming appointment  Hyperlipidemia - Recommend lifestyle modifications

## 2022-04-26 ENCOUNTER — OFFICE VISIT (OUTPATIENT)
Dept: FAMILY MEDICINE CLINIC | Facility: CLINIC | Age: 32
End: 2022-04-26
Payer: COMMERCIAL

## 2022-04-26 VITALS
HEART RATE: 84 BPM | RESPIRATION RATE: 16 BRPM | DIASTOLIC BLOOD PRESSURE: 86 MMHG | OXYGEN SATURATION: 100 % | BODY MASS INDEX: 31.14 KG/M2 | WEIGHT: 198.4 LBS | TEMPERATURE: 97.6 F | HEIGHT: 67 IN | SYSTOLIC BLOOD PRESSURE: 120 MMHG

## 2022-04-26 DIAGNOSIS — M54.6 ACUTE BILATERAL THORACIC BACK PAIN: ICD-10-CM

## 2022-04-26 DIAGNOSIS — M51.16 LUMBAR DISC DISEASE WITH RADICULOPATHY: ICD-10-CM

## 2022-04-26 DIAGNOSIS — E66.9 OBESITY (BMI 30.0-34.9): ICD-10-CM

## 2022-04-26 DIAGNOSIS — Z00.00 ANNUAL PHYSICAL EXAM: Primary | ICD-10-CM

## 2022-04-26 DIAGNOSIS — N94.6 DYSMENORRHEA: ICD-10-CM

## 2022-04-26 DIAGNOSIS — G89.29 CHRONIC MIDLINE LOW BACK PAIN WITH BILATERAL SCIATICA: ICD-10-CM

## 2022-04-26 DIAGNOSIS — E03.9 HYPOTHYROIDISM, UNSPECIFIED TYPE: ICD-10-CM

## 2022-04-26 DIAGNOSIS — E66.9 CLASS 1 OBESITY WITHOUT SERIOUS COMORBIDITY WITH BODY MASS INDEX (BMI) OF 31.0 TO 31.9 IN ADULT, UNSPECIFIED OBESITY TYPE: ICD-10-CM

## 2022-04-26 DIAGNOSIS — E78.5 HYPERLIPIDEMIA, UNSPECIFIED HYPERLIPIDEMIA TYPE: ICD-10-CM

## 2022-04-26 DIAGNOSIS — D50.9 IRON DEFICIENCY ANEMIA, UNSPECIFIED IRON DEFICIENCY ANEMIA TYPE: ICD-10-CM

## 2022-04-26 DIAGNOSIS — M54.42 CHRONIC MIDLINE LOW BACK PAIN WITH BILATERAL SCIATICA: ICD-10-CM

## 2022-04-26 DIAGNOSIS — G43.009 MIGRAINE WITHOUT AURA AND WITHOUT STATUS MIGRAINOSUS, NOT INTRACTABLE: ICD-10-CM

## 2022-04-26 DIAGNOSIS — M54.41 CHRONIC MIDLINE LOW BACK PAIN WITH BILATERAL SCIATICA: ICD-10-CM

## 2022-04-26 DIAGNOSIS — M47.816 LUMBAR SPONDYLOSIS: ICD-10-CM

## 2022-04-26 PROCEDURE — 3008F BODY MASS INDEX DOCD: CPT | Performed by: FAMILY MEDICINE

## 2022-04-26 PROCEDURE — 1036F TOBACCO NON-USER: CPT | Performed by: FAMILY MEDICINE

## 2022-04-26 PROCEDURE — 99395 PREV VISIT EST AGE 18-39: CPT | Performed by: FAMILY MEDICINE

## 2022-04-26 PROCEDURE — 3725F SCREEN DEPRESSION PERFORMED: CPT | Performed by: FAMILY MEDICINE

## 2022-04-26 PROCEDURE — 99213 OFFICE O/P EST LOW 20 MIN: CPT | Performed by: FAMILY MEDICINE

## 2022-04-26 RX ORDER — LEVOTHYROXINE SODIUM 0.03 MG/1
TABLET ORAL
Qty: 90 TABLET | Refills: 1 | Status: SHIPPED | OUTPATIENT
Start: 2022-04-26

## 2022-04-26 RX ORDER — RIZATRIPTAN BENZOATE 10 MG/1
10 TABLET, ORALLY DISINTEGRATING ORAL ONCE AS NEEDED
Qty: 9 TABLET | Refills: 0 | Status: SHIPPED | OUTPATIENT
Start: 2022-04-26

## 2022-04-26 NOTE — PATIENT INSTRUCTIONS
For headache and migraine prevention I would suggest a combination vitamin supplement which may include 1 or more of the following ingredients:  Magnesium 400 mg , Riboflavin (vitamin B2) 400 - 600 mg,  Butterbur 150 mg (PA free)  Other ingredients that may be helpful are feverfew, coenzyme Q10 100 mg three times a day,  melatonin and christina  Some example brands that combine some of these ingredients are Migravent or [employer]  Wellness Visit for Adults   AMBULATORY CARE:   A wellness visit  is when you see your healthcare provider to get screened for health problems  Your healthcare provider will also give you advice on how to stay healthy  Write down your questions so you remember to ask them  Ask your healthcare provider how often you should have a wellness visit  What happens at a wellness visit:  Your healthcare provider will ask about your health, and your family history of health problems  This includes high blood pressure, heart disease, and cancer  He or she will ask if you have symptoms that concern you, if you smoke, and about your mood  You may also be asked about your intake of medicines, supplements, food, and alcohol  Any of the following may be done:  · Your weight  will be checked  Your height may also be checked so your body mass index (BMI) can be calculated  Your BMI shows if you are at a healthy weight  · Your blood pressure  and heart rate will be checked  Your temperature may also be checked  · Blood and urine tests  may be done  Blood tests may be done to check your cholesterol levels  Abnormal cholesterol levels increase your risk for heart disease and stroke  You may also need a blood or urine test to check for diabetes if you are at increased risk  Urine tests may be done to look for signs of an infection or kidney disease  · A physical exam  includes checking your heartbeat and lungs with a stethoscope   Your healthcare provider may also check your skin to look for sun damage  · Screening tests  may be recommended  A screening test is done to check for diseases that may not cause symptoms  The screening tests you may need depend on your age, gender, family history, and lifestyle habits  For example, colorectal screening may be recommended if you are 48years old or older  Screening tests you need if you are a woman:   · A Pap smear  is used to screen for cervical cancer  Pap smears are usually done every 3 to 5 years depending on your age  You may need them more often if you have had abnormal Pap smear test results in the past  Ask your healthcare provider how often you should have a Pap smear  · A mammogram  is an x-ray of your breasts to screen for breast cancer  Experts recommend mammograms every 2 years starting at age 48 years  You may need a mammogram at age 52 years or younger if you have an increased risk for breast cancer  Talk to your healthcare provider about when you should start having mammograms and how often you need them  Vaccines you may need:   · Get an influenza vaccine  every year  The influenza vaccine protects you from the flu  Several types of viruses cause the flu  The viruses change over time, so new vaccines are made each year  · Get a tetanus-diphtheria (Td) booster vaccine  every 10 years  This vaccine protects you against tetanus and diphtheria  Tetanus is a severe infection that may cause painful muscle spasms and lockjaw  Diphtheria is a severe bacterial infection that causes a thick covering in the back of your mouth and throat  · Get a human papillomavirus (HPV) vaccine  if you are female and aged 23 to 32 or male 23 to 24 and never received it  This vaccine protects you from HPV infection  HPV is the most common infection spread by sexual contact  HPV may also cause vaginal, penile, and anal cancers  · Get a pneumococcal vaccine  if you are aged 72 years or older   The pneumococcal vaccine is an injection given to protect you from pneumococcal disease  Pneumococcal disease is an infection caused by pneumococcal bacteria  The infection may cause pneumonia, meningitis, or an ear infection  · Get a shingles vaccine  if you are 60 or older, even if you have had shingles before  The shingles vaccine is an injection to protect you from the varicella-zoster virus  This is the same virus that causes chickenpox  Shingles is a painful rash that develops in people who had chickenpox or have been exposed to the virus  How to eat healthy:  My Plate is a model for planning healthy meals  It shows the types and amounts of foods that should go on your plate  Fruits and vegetables make up about half of your plate, and grains and protein make up the other half  A serving of dairy is included on the side of your plate  The amount of calories and serving sizes you need depends on your age, gender, weight, and height  Examples of healthy foods are listed below:  · Eat a variety of vegetables  such as dark green, red, and orange vegetables  You can also include canned vegetables low in sodium (salt) and frozen vegetables without added butter or sauces  · Eat a variety of fresh fruits , canned fruit in 100% juice, frozen fruit, and dried fruit  · Include whole grains  At least half of the grains you eat should be whole grains  Examples include whole-wheat bread, wheat pasta, brown rice, and whole-grain cereals such as oatmeal     · Eat a variety of protein foods such as seafood (fish and shellfish), lean meat, and poultry without skin (turkey and chicken)  Examples of lean meats include pork leg, shoulder, or tenderloin, and beef round, sirloin, tenderloin, and extra lean ground beef  Other protein foods include eggs and egg substitutes, beans, peas, soy products, nuts, and seeds  · Choose low-fat dairy products such as skim or 1% milk or low-fat yogurt, cheese, and cottage cheese      · Limit unhealthy fats  such as butter, hard margarine, and shortening  Exercise:  Exercise at least 30 minutes per day on most days of the week  Some examples of exercise include walking, biking, dancing, and swimming  You can also fit in more physical activity by taking the stairs instead of the elevator or parking farther away from stores  Include muscle strengthening activities 2 days each week  Regular exercise provides many health benefits  It helps you manage your weight, and decreases your risk for type 2 diabetes, heart disease, stroke, and high blood pressure  Exercise can also help improve your mood  Ask your healthcare provider about the best exercise plan for you  General health and safety guidelines:   · Do not smoke  Nicotine and other chemicals in cigarettes and cigars can cause lung damage  Ask your healthcare provider for information if you currently smoke and need help to quit  E-cigarettes or smokeless tobacco still contain nicotine  Talk to your healthcare provider before you use these products  · Limit alcohol  A drink of alcohol is 12 ounces of beer, 5 ounces of wine, or 1½ ounces of liquor  · Lose weight, if needed  Being overweight increases your risk of certain health conditions  These include heart disease, high blood pressure, type 2 diabetes, and certain types of cancer  · Protect your skin  Do not sunbathe or use tanning beds  Use sunscreen with a SPF 15 or higher  Apply sunscreen at least 15 minutes before you go outside  Reapply sunscreen every 2 hours  Wear protective clothing, hats, and sunglasses when you are outside  · Drive safely  Always wear your seatbelt  Make sure everyone in your car wears a seatbelt  A seatbelt can save your life if you are in an accident  Do not use your cell phone when you are driving  This could distract you and cause an accident  Pull over if you need to make a call or send a text message  · Practice safe sex    Use latex condoms if are sexually active and have more than one partner  Your healthcare provider may recommend screening tests for sexually transmitted infections (STIs)  · Wear helmets, lifejackets, and protective gear  Always wear a helmet when you ride a bike or motorcycle, go skiing, or play sports that could cause a head injury  Wear protective equipment when you play sports  Wear a lifejacket when you are on a boat or doing water sports  © Copyright [employer] 2022 Information is for End User's use only and may not be sold, redistributed or otherwise used for commercial purposes  All illustrations and images included in CareNotes® are the copyrighted property of [employer] M [employer] Inc  or [employer]   The above information is an  only  It is not intended as medical advice for individual conditions or treatments  Talk to your doctor, nurse or pharmacist before following any medical regimen to see if it is safe and effective for you  Obesity   AMBULATORY CARE:   Obesity  means your body mass index (BMI) is greater than 30  Your healthcare provider will use your height and weight to measure your BMI  The risks of obesity include  many health problems, including injuries or physical disability  · Diabetes (high blood sugar level)    · High blood pressure or high cholesterol    · Heart disease    · Stroke    · Gallbladder or liver disease    · Cancer of the colon, breast, prostate, liver, or kidney    · Sleep apnea    · Arthritis or gout    Screening  is done to check for health conditions before you have signs or symptoms  If you are 28to 79years old, your blood sugar level may be checked every 3 years for signs of prediabetes or diabetes  Your healthcare provider will check your blood pressure at each visit  High blood pressure can lead to a stroke or other problems  Your provider may check for signs of heart disease, cancer, or other health problems    Seek care immediately if:   · You have a severe headache, confusion, or difficulty speaking  · You have weakness on one side of your body  · You have chest pain, sweating, or shortness of breath  Call your doctor if:   · You have symptoms of gallbladder or liver disease, such as pain in your upper abdomen  · You have knee or hip pain and discomfort while walking  · You have symptoms of diabetes, such as intense hunger and thirst, and frequent urination  · You have symptoms of sleep apnea, such as snoring or daytime sleepiness  · You have questions or concerns about your condition or care  Treatment for obesity  focuses on helping you lose weight to improve your health  Even a small decrease in BMI can reduce the risk for many health problems  Your healthcare provider will help you set a weight-loss goal   · Lifestyle changes  are the first step in treating obesity  These include making healthy food choices and getting regular physical activity  Your healthcare provider may suggest a weight-loss program that involves coaching, education, and therapy  · Medicine  may help you lose weight when it is used with a healthy foods and physical activity  · Surgery  can help you lose weight if you are very obese and have other health problems  There are several types of weight-loss surgery  Ask your healthcare provider for more information  Tips for safe weight loss:   · Set small, realistic goals  An example of a small goal is to walk for 20 minutes 5 days a week  Anther goal is to lose 5% of your body weight  · Tell friends, family members, and coworkers about your goals  and ask for their support  Ask a friend to lose weight with you, or join a weight-loss support group  · Identify foods or triggers that may cause you to overeat , and find ways to avoid them  Remove tempting high-calorie foods from your home and workplace  Place a bowl of fresh fruit on your kitchen counter  If stress causes you to eat, then find other ways to cope with stress   A counselor or therapist may be able to help you  · Keep a diary to track what you eat and drink  Also write down how many minutes of physical activity you do each day  Weigh yourself once a week and record it in your diary  Eating changes: You will need to eat 500 to 1,000 fewer calories each day than you currently eat to lose 1 to 2 pounds a week  The following changes will help you cut calories:  · Eat smaller portions  Use small plates, no larger than 9 inches in diameter  Fill your plate half full of fruits and vegetables  Measure your food using measuring cups until you know what a serving size looks like  · Eat 3 meals and 1 or 2 snacks each day  Plan your meals in advance  Viv Rocha and eat at home most of the time  Eat slowly  Do not skip meals  Skipping meals can lead to overeating later in the day  This can make it harder for you to lose weight  Talk with a dietitian to help you make a meal plan and schedule that is right for you  · Eat fruits and vegetables at every meal   They are low in calories and high in fiber, which makes you feel full  Do not add butter, margarine, or cream sauce to vegetables  Use herbs to season steamed vegetables  · Eat less fat and fewer fried foods  Eat more baked or grilled chicken and fish  These protein sources are lower in calories and fat than red meat  Limit fast food  Dress your salads with olive oil and vinegar instead of bottled dressing  · Limit the amount of sugar you eat  Do not drink sugary beverages  Limit alcohol  Activity changes:  Physical activity is good for your body in many ways  It helps you burn calories and build strong muscles  It decreases stress and depression, and improves your mood  It can also help you sleep better  Talk to your healthcare provider before you begin an exercise program   · Exercise for at least 30 minutes 5 days a week  Start slowly   Set aside time each day for physical activity that you enjoy and that is convenient for you  It is best to do both weight training and an activity that increases your heart rate, such as walking, bicycling, or swimming  · Find ways to be more active  Do yard work and housecleaning  Walk up the stairs instead of using elevators  Spend your leisure time going to events that require walking, such as outdoor festivals or fairs  This extra physical activity can help you lose weight and keep it off  Follow up with your doctor as directed: You may need to meet with a dietitian  Write down your questions so you remember to ask them during your visits  © Copyright [employer] 2022 Information is for End User's use only and may not be sold, redistributed or otherwise used for commercial purposes  All illustrations and images included in CareNotes® are the copyrighted property of [employer]  or [employer]  The above information is an  only  It is not intended as medical advice for individual conditions or treatments  Talk to your doctor, nurse or pharmacist before following any medical regimen to see if it is safe and effective for you  Cholesterol and Your Health   AMBULATORY CARE:   Cholesterol  is a waxy, fat-like substance  Your body uses cholesterol to make hormones and new cells, and to protect nerves  Cholesterol is made by your body  It also comes from certain foods you eat, such as meat and dairy products  Your healthcare provider can help you set goals for your cholesterol levels  He or she can help you create a plan to meet your goals  Cholesterol level goals: Your cholesterol level goals depend on your risk for heart disease, your age, and your other health conditions  The following are general guidelines:  · Total cholesterol  includes low-density lipoprotein (LDL), high-density lipoprotein (HDL), and triglyceride levels  The total cholesterol level should be lower than 200 mg/dL and is best at about 150 mg/dL      · LDL cholesterol is called bad cholesterol  because it forms plaque in your arteries  As plaque builds up, your arteries become narrow, and less blood flows through  When plaque decreases blood flow to your heart, you may have chest pain  If plaque completely blocks an artery that brings blood to your heart, you may have a heart attack  Plaque can break off and form blood clots  Blood clots may block arteries in your brain and cause a stroke  The level should be less than 130 mg/dL and is best at about 100 mg/dL  · HDL cholesterol  is called good cholesterol  because it helps remove LDL cholesterol from your arteries  It does this by attaching to LDL cholesterol and carrying it to your liver  Your liver breaks down LDL cholesterol so your body can get rid of it  High levels of HDL cholesterol can help prevent a heart attack and stroke  Low levels of HDL cholesterol can increase your risk for heart disease, heart attack, and stroke  The level should be 60 mg/dL or higher  · Triglycerides  are a type of fat that store energy from foods you eat  High levels of triglycerides also cause plaque buildup  This can increase your risk for a heart attack or stroke  If your triglyceride level is high, your LDL cholesterol level may also be high  The level should be less than 150 mg/dL  Any of the following can increase your risk for high cholesterol:   · Smoking cigarettes    · Being overweight or obese, or not getting enough exercise    · Drinking large amounts of alcohol    · A medical condition such as hypertension (high blood pressure) or diabetes    · Certain genes passed from your parents to you    · Age older than 65 years    What you need to know about having your cholesterol levels checked: Adults 21to 39years of age should have their cholesterol levels checked every 4 to 6 years  Adults 45 years or older should have their cholesterol checked every 1 to 2 years   You may need your cholesterol checked more often, or at a younger age, if you have risk factors for heart disease  You may also need to have your cholesterol checked more often if you have other health conditions, such as diabetes  Blood tests are used to check cholesterol levels  Blood tests measure your levels of triglycerides, LDL cholesterol, and HDL cholesterol  How healthy fats affect your cholesterol levels:  Healthy fats, also called unsaturated fats, help lower LDL cholesterol and triglyceride levels  Healthy fats include the following:  · Monounsaturated fats  are found in foods such as olive oil, canola oil, avocado, nuts, and olives  · Polyunsaturated fats,  such as omega 3 fats, are found in fish, such as salmon, trout, and tuna  They can also be found in plant foods such as flaxseed, walnuts, and soybeans  How unhealthy fats affect your cholesterol levels:  Unhealthy fats increase LDL cholesterol and triglyceride levels  They are found in foods high in cholesterol, saturated fat, and trans fat:  · Cholesterol  is found in eggs, dairy, and meat  · Saturated fat  is found in butter, cheese, ice cream, whole milk, and coconut oil  Saturated fat is also found in meat, such as sausage, hot dogs, and bologna  · Trans fat  is found in liquid oils and is used in fried and baked foods  Foods that contain trans fats include chips, crackers, muffins, sweet rolls, microwave popcorn, and cookies  Treatment  for high cholesterol will also decrease your risk of heart disease, heart attack, and stroke  Treatment may include any of the following:  · Lifestyle changes  may include food, exercise, weight loss, and quitting smoking  You may also need to decrease the amount of alcohol you drink  Your healthcare provider will want you to start with lifestyle changes  Other treatment may be added if lifestyle changes are not enough  Your healthcare provider may recommend you work with a team to manage hyperlipidemia   The team may include medical experts such as luke dietitian, an exercise or physical therapist, and a behavior therapist  Your family members may be included in helping you create lifestyle changes  · Medicines  may be given to lower your LDL cholesterol, triglyceride levels, or total cholesterol level  You may need medicines to lower your cholesterol if any of the following is true:    ? You have a history of stroke, TIA, unstable angina, or a heart attack  ? Your LDL cholesterol level is 190 mg/dL or higher  ? You are age 36 to 76 years, have diabetes or heart disease risk factors, and your LDL cholesterol is 70 mg/dL or higher  · Supplements  include fish oil, red yeast rice, and garlic  Fish oil may help lower your triglyceride and LDL cholesterol levels  It may also increase your HDL cholesterol level  Red yeast rice may help decrease your total cholesterol level and LDL cholesterol level  Garlic may help lower your total cholesterol level  Do not take any supplements without talking to your healthcare provider  Food changes you can make to lower your cholesterol levels:  A dietitian can help you create a healthy eating plan  He or she can show you how to read food labels and choose foods low in saturated fat, trans fats, and cholesterol  · Decrease the total amount of fat you eat  Choose lean meats, fat-free or 1% fat milk, and low-fat dairy products, such as yogurt and cheese  Try to limit or avoid red meats  Limit or do not eat fried foods or baked goods, such as cookies  · Replace unhealthy fats with healthy fats  Cook foods in olive oil or canola oil  Choose soft margarines that are low in saturated fat and trans fat  Seeds, nuts, and avocados are other examples of healthy fats  · Eat foods with omega-3 fats  Examples include salmon, tuna, mackerel, walnuts, and flaxseed  Eat fish 2 times per week  Pregnant women should not eat fish that have high levels of mercury, such as shark, swordfish, and melodie mackerel  · Increase the amount of high-fiber foods you eat  High-fiber foods can help lower your LDL cholesterol  Aim to get between 20 and 30 grams of fiber each day  Fruits and vegetables are high in fiber  Eat at least 5 servings each day  Other high-fiber foods are whole-grain or whole-wheat breads, pastas, or cereals, and brown rice  Eat 3 ounces of whole-grain foods each day  Increase fiber slowly  You may have abdominal discomfort, bloating, and gas if you add fiber to your diet too quickly  · Eat healthy protein foods  Examples include low-fat dairy products, skinless chicken and turkey, fish, and nuts  · Limit foods and drinks that are high in sugar  Your dietitian or healthcare provider can help you create daily limits for high-sugar foods and drinks  The limit may be lower if you have diabetes or another health condition  Limits can also help you lose weight if needed  Lifestyle changes you can make to lower your cholesterol levels:   · Maintain a healthy weight  Ask your healthcare provider what a healthy weight is for you  Ask him or her to help you create a weight loss plan if needed  Weight loss can decrease your total cholesterol and triglyceride levels  Weight loss may also help keep your blood pressure at a healthy level  · Be physically active throughout the day  Physical activity, such as exercise, can help lower your total cholesterol level and maintain a healthy weight  Physical activity can also help increase your HDL cholesterol level  Work with your healthcare provider to create an program that is right for you  Get at least 30 to 40 minutes of moderate physical activity most days of the week  Examples of exercise include brisk walking, swimming, or biking  Also include strength training at least 2 times each week  Your healthcare providers can help you create a physical activity plan  · Do not smoke    Nicotine and other chemicals in cigarettes and cigars can raise your cholesterol levels  Ask your healthcare provider for information if you currently smoke and need help to quit  E-cigarettes or smokeless tobacco still contain nicotine  Talk to your healthcare provider before you use these products  · Limit or do not drink alcohol  Alcohol can increase your triglyceride levels  Ask your healthcare provider before you drink alcohol  Ask how much is okay for you to drink in 24 hours or 1 week  Follow up with your doctor as directed:  Write down your questions so you remember to ask them during your visits  © Copyright [employer] 2022 Information is for End User's use only and may not be sold, redistributed or otherwise used for commercial purposes  All illustrations and images included in CareNotes® are the copyrighted property of A [employer] A M [employer] Inc  or [facility][note 3] Alejo Luis Alberto   The above information is an  only  It is not intended as medical advice for individual conditions or treatments  Talk to your doctor, nurse or pharmacist before following any medical regimen to see if it is safe and effective for you

## 2022-04-26 NOTE — PROGRESS NOTES
Assessment/Plan:  Problem List Items Addressed This Visit        Endocrine    Hypothyroid      Stable on Synthroid 25 mcg daily  Relevant Medications    levothyroxine 25 mcg tablet    Other Relevant Orders    TSH, 3rd generation with Free T4 reflex       Cardiovascular and Mediastinum    Migraines     Stable on Maxalt MLT 10mg PRN and Compazine 10mg PRN  For headache and migraine prevention I would suggest a combination vitamin supplement which may include 1 or more of the following ingredients:  Magnesium 400 mg , Riboflavin (vitamin B2) 400 - 600 mg,  Butterbur 150 mg (PA free)  Other ingredients that may be helpful are feverfew, coenzyme Q10 100 mg three times a day,  melatonin and christina  Some example brands that combine some of these ingredients are Migravent or Dolovent  Relevant Medications    rizatriptan (MAXALT-MLT) 10 MG disintegrating tablet    Other Relevant Orders    Magnesium       Nervous and Auditory    Lumbar disc disease with radiculopathy     Management per Pain Management - overdue for appointment  Improved s/p radiofrequency ablation  Relevant Orders    Ambulatory Referral to Physical Therapy    Chronic midline low back pain with bilateral sciatica     Management per Pain Management - overdue for appointment  Improved s/p radiofrequency ablation  Relevant Orders    Ambulatory Referral to Physical Therapy       Musculoskeletal and Integument    Lumbar spondylosis     Management per Pain Management - overdue for appointment  Improved s/p radiofrequency ablation  Relevant Orders    Ambulatory Referral to Physical Therapy       Genitourinary    Dysmenorrhea     On OCP per Gyn  Other    Class 1 obesity without serious comorbidity with body mass index (BMI) of 31 0 to 31 9 in adult     Recommend lifestyle modifications  Patient declines weight management consult           Hyperlipidemia     Stable s statin  Recommend lifestyle modifications  Relevant Orders    Comprehensive metabolic panel    Lipid panel    TSH, 3rd generation with Free T4 reflex    LDL cholesterol, direct    Iron deficiency anemia     Stable  Continue MVI and OCP c Fe  Other Visit Diagnoses     Annual physical exam    -  Primary    Acute bilateral thoracic back pain        Relevant Orders    Ambulatory Referral to Physical Therapy    Suspect herniated disc vs  Strain  Advise Motrin/Tylenol PRN  Obesity (BMI 30 0-34  9)        Relevant Orders    TSH, 3rd generation with Free T4 reflex    BMI 30 0-30 9,adult        Relevant Orders    TSH, 3rd generation with Free T4 reflex    BMI 31 0-31 9,adult        Relevant Orders    TSH, 3rd generation with Free T4 reflex           Return in about 6 months (around 10/26/2022) for 6mo - Hypothyroid, Migraines, Myalgias, Obesity, Labs  Future Appointments   Date Time Provider Sony De Santiago   11/1/2022  5:40 PM DO CAND[facility]        Subjective:     Naseem Olmos is a 32 y o  female who presents today for a follow-up on her chronic medical conditions  HPI:  Chief Complaint   Patient presents with    Physical Exam    Labs     Done 03/05/2022        Medication Refill     -- Above per clinical staff and reviewed  --      HPI      Today:         Return in about 6 months (around 1/26/2022) for  Physical / 6mo - Hypothyroid, Migraines, Myalgias, Obesity, Labs  6mo OV - Overdue    B/L Thoracic T12 Back Pain  - Symptoms x 1 month, worse in the past 2 weeks  Denies trauma  Dull, achy pain, lasts for seconds  Currently 4/10, Max 7/10  Worse c flexion and sitting  Stretching s benefit  Using Tylenol 500mg 2 pills QD PRN - unhelpful  Posture brace caused worsening             Obesity - Watching diet   +Exercise - Works out [employer], lifting, 7 days per week    She is working with a dietician at her gym          H/O Fe Def Anemia - used to take oral supplements, then D/C due to GI upset and symptom improvement   On MVI and OCP contains iron        Palpitations - Occurs nightly   Last saw Cardio Dr Lei Both 5/9/19, 1/19/20    Stable stress test 5/16/19  Last Holter monitor 11/5/20 - Stable        Migraines - On Compazine 10mg q8 hours PRN and Maxalt-MLT 10mg PRN - using 2nd dose - helpful   Improved with improvement in back pain - sleeping better with Melatonin 3-6mg nightly   Symptoms since 15yo   Migraines occur 2 times every 4 weeks  Using Excedrin PRN - helpful   Improves c sleep, ice pack, bitemporal massage   Denies aura   Occurs upon awakening in the AM or at the end of the day   Bi-temporal headache, across forhead, in band distribution   Intense pressure   Worse c movement, photophobia, phonophobia   Sometimes has associated nausea and vertigo   Sometimes lasts 6-12 hours  Pushes fluids - about  oz water daily   Previously on Elavil at 15yo - made her paul         PHQ-2/9 Depression Screening    Little interest or pleasure in doing things: 0 - not at all  Feeling down, depressed, or hopeless: 0 - not at all  PHQ-2 Score: 0  PHQ-2 Interpretation: Negative depression screen            Herniated Lumbar Disc - Management per Pain Management per Dr Remy Packer for back pain   Overdue for appt 7/20   Stable with exercise    Still feels tightness in her back   Feels as though her muscles spasm so much 1-2 days after exercise to pull her shoulder and knee cap out of joint   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   Referred to Physiatry   s/p LAINEY at 15yo in [place] - helpful   Saw [facility]iat[facility] 9/24/19, s/p Lumbar Spine MRI 10/5/19 -   Showed small broad-based central perfusion protrusion type disc herniation at L5/S1   No significant central foraminal narrowing, s/p facet radio-frequency ablation 1/8/20 per pain Management           Reviewed:  Labs 3/5/22, Cardio 5/9/19, Gyn 1/22/18, Physiatry 10/5/19, Pain Management 5/19/20, Rheum 2/18/21     Dysmenorrhea - Sees Gyn Ms Maico Marrero RICHIE at [facility] yearly   Next appt 1/19 - overdue   Skips placebo week of OCP       Sees Dentist q6 months  Sees Optho q2 years  The following portions of the patient's history were reviewed and updated as appropriate: allergies, current medications, past family history, past medical history, past social history, past surgical history and problem list       Review of Systems   Constitutional: Negative for appetite change, chills, diaphoresis, fatigue and fever  Respiratory: Negative for chest tightness and shortness of breath  Cardiovascular: Negative for chest pain  Gastrointestinal: Negative for abdominal pain, blood in stool, diarrhea, nausea and vomiting  Genitourinary: Negative for dysuria  Musculoskeletal: Positive for back pain  Current Outpatient Medications   Medication Sig Dispense Refill    cholecalciferol (VITAMIN D3) 1,000 units tablet Take 1,000 Units by mouth daily      levothyroxine 25 mcg tablet 1 pill by mouth once daily on an empty stomach, 30 minutes prior to eating food or drink  90 tablet 1    melatonin 3 mg Take 3 mg by mouth daily at bedtime 1-2 tabs by mouth QHS        Multiple Vitamin (MULTIVITAMINS PO) Take 1 tablet by mouth daily       Norethin-Eth Estrad-Fe Biphas (LO LOESTRIN FE) 1 MG-10 MCG / 10 MCG TABS Take 1 tablet by mouth daily Rx per Gyn      diclofenac sodium (VOLTAREN) 1 % Apply 2 g topically 4 (four) times a day (Patient not taking: Reported on 4/26/2022 ) 1 Tube 1    methocarbamol (ROBAXIN) 500 mg tablet Take 1 tablet (500 mg total) by mouth 2 (two) times a day as needed for muscle spasms (Patient not taking: Reported on 4/26/2022 ) 60 tablet 1    prochlorperazine (COMPAZINE) 10 mg tablet Take 1 tablet (10 mg total) by mouth every 8 (eight) hours as needed for nausea or vomiting (Migraine) (Patient not taking: Reported on 4/26/2022 ) 30 tablet 0    rizatriptan (MAXALT-MLT) 10 MG disintegrating tablet Take 1 tablet (10 mg total) by mouth once as needed for migraine (Max 30 mg/24 hrs ) May repeat in 2 hours if needed 9 tablet 0     No current facility-administered medications for this visit  Objective:  /86   Pulse 84   Temp 97 6 °F (36 4 °C)   Resp 16   Ht 5' 7" (1 702 m)   Wt 90 kg (198 lb 6 4 oz)   SpO2 100%   BMI 31 07 kg/m²    Wt Readings from Last 3 Encounters:   04/26/22 90 kg (198 lb 6 4 oz)   07/26/21 91 2 kg (201 lb)   05/19/21 89 4 kg (197 lb)      BP Readings from Last 3 Encounters:   04/26/22 120/86   07/26/21 108/70   10/21/20 116/78          Physical Exam  Vitals and nursing note reviewed  Constitutional:       Appearance: Normal appearance  She is well-developed  She is obese  HENT:      Head: Normocephalic and atraumatic  Right Ear: Tympanic membrane, ear canal and external ear normal       Left Ear: Tympanic membrane, ear canal and external ear normal       Nose: Nose normal       Right Sinus: No maxillary sinus tenderness or frontal sinus tenderness  Left Sinus: No maxillary sinus tenderness or frontal sinus tenderness  Mouth/Throat:      Mouth: Mucous membranes are moist       Pharynx: Oropharynx is clear  Uvula midline  Tonsils: No tonsillar exudate  Eyes:      Extraocular Movements: Extraocular movements intact  Conjunctiva/sclera: Conjunctivae normal       Pupils: Pupils are equal, round, and reactive to light  Cardiovascular:      Rate and Rhythm: Normal rate and regular rhythm  Pulses: Normal pulses  Heart sounds: Normal heart sounds  Pulmonary:      Effort: Pulmonary effort is normal       Breath sounds: Normal breath sounds  Abdominal:      General: Bowel sounds are normal  There is no distension  Palpations: Abdomen is soft  There is no mass  Tenderness: There is no abdominal tenderness  There is no guarding or rebound  Musculoskeletal:         General: Tenderness present  No swelling  Cervical back: Neck supple  Thoracic back: Tenderness (Central and B/L Transvers Processes T11-T12 ) and bony tenderness (Central and B/L Transvers Processes T11-T12) present  No swelling, edema, deformity, signs of trauma, lacerations or spasms  Decreased range of motion (B/L SB)  Right lower leg: No edema  Left lower leg: No edema  Lymphadenopathy:      Cervical: No cervical adenopathy  Skin:     Findings: No rash  Neurological:      General: No focal deficit present  Mental Status: She is alert and oriented to person, place, and time  Psychiatric:         Mood and Affect: Mood normal          Behavior: Behavior normal          Thought Content: Thought content normal          Judgment: Judgment normal          Lab Results:      Lab Results   Component Value Date    WBC 7 35 03/05/2022    HGB 14 2 03/05/2022    HCT 43 8 03/05/2022     03/05/2022    TRIG 86 03/05/2022    HDL 62 03/05/2022    LDLDIRECT 114 (H) 03/05/2022    ALT 21 03/05/2022    AST 17 03/05/2022     07/01/2014    K 4 2 03/05/2022     03/05/2022    CREATININE 0 94 03/05/2022    BUN 11 03/05/2022    CO2 26 03/05/2022    GLUF 86 03/05/2022    HGBA1C 4 8 07/22/2019     Lab Results   Component Value Date    URICACID 4 0 10/22/2020     Invalid input(s): BASENAME Vitamin D    No results found  POCT Labs      BMI Counseling: Body mass index is 31 07 kg/m²  The BMI is above normal  Nutrition recommendations include encouraging healthy choices of fruits and vegetables  Exercise recommendations include exercising 3-5 times per week  No pharmacotherapy was ordered  Rationale for BMI follow-up plan is due to patient being overweight or obese  Depression Screening and Follow-up Plan: Patient was screened for depression during today's encounter  They screened negative with a PHQ-2 score of 0

## 2022-04-27 NOTE — PROGRESS NOTES
Assessment/Plan:  Problem List Items Addressed This Visit        Endocrine    Hypothyroid      Stable on Synthroid 25 mcg daily  Relevant Medications    levothyroxine 25 mcg tablet    Other Relevant Orders    TSH, 3rd generation with Free T4 reflex       Cardiovascular and Mediastinum    Migraines     Stable on Maxalt MLT 10mg PRN and Compazine 10mg PRN  For headache and migraine prevention I would suggest a combination vitamin supplement which may include 1 or more of the following ingredients:  Magnesium 400 mg , Riboflavin (vitamin B2) 400 - 600 mg,  Butterbur 150 mg (PA free)  Other ingredients that may be helpful are feverfew, coenzyme Q10 100 mg three times a day,  melatonin and christina  Some example brands that combine some of these ingredients are Migravent or Dolovent  Relevant Medications    rizatriptan (MAXALT-MLT) 10 MG disintegrating tablet    Other Relevant Orders    Magnesium       Nervous and Auditory    Lumbar disc disease with radiculopathy     Management per Pain Management - overdue for appointment  Improved s/p radiofrequency ablation  Relevant Orders    Ambulatory Referral to Physical Therapy    Chronic midline low back pain with bilateral sciatica     Management per Pain Management - overdue for appointment  Improved s/p radiofrequency ablation  Relevant Orders    Ambulatory Referral to Physical Therapy       Musculoskeletal and Integument    Lumbar spondylosis     Management per Pain Management - overdue for appointment  Improved s/p radiofrequency ablation  Relevant Orders    Ambulatory Referral to Physical Therapy       Genitourinary    Dysmenorrhea     On OCP per Gyn  Other    Class 1 obesity without serious comorbidity with body mass index (BMI) of 31 0 to 31 9 in adult     Recommend lifestyle modifications  Patient declines weight management consult           Hyperlipidemia     Stable s statin  Recommend lifestyle modifications  Relevant Orders    Comprehensive metabolic panel    Lipid panel    TSH, 3rd generation with Free T4 reflex    LDL cholesterol, direct    Iron deficiency anemia     Stable  Continue MVI and OCP c Fe  Other Visit Diagnoses     Annual physical exam    -  Primary    Acute bilateral thoracic back pain        Relevant Orders    Ambulatory Referral to Physical Therapy    Obesity (BMI 30 0-34  9)        Relevant Orders    TSH, 3rd generation with Free T4 reflex    BMI 30 0-30 9,adult        Relevant Orders    TSH, 3rd generation with Free T4 reflex    BMI 31 0-31 9,adult        Relevant Orders    TSH, 3rd generation with Free T4 reflex           Return in about 6 months (around 10/26/2022) for 6mo - Hypothyroid, Migraines, Myalgias, Obesity, Labs  Future Appointments   Date Time Provider Sony De Santiago   11/1/2022  5:40 PM Ivette Posada DO [facility]        Subjective:     Elisabet Salas is a 32 y o  female who presents today for a follow-up on her chronic medical conditions  HPI:  Chief Complaint   Patient presents with    Physical Exam    Labs     Done 03/05/2022    Hm    Medication Refill     -- Above per clinical staff and reviewed  --      HPI      Today:      Physical    Watching diet  +Exercise  Dysmenorrhea - Sees Gyn Ms  Mahin Laura PA-[facility] at [facility] yearly   Next appt 1/19 - overdue   Skips placebo week of OCP        Sees Dentist q6 months  Sees Optho q2 years            Health Maintenance   Topic Date Due    BMI: Followup Plan  07/26/2022    Cervical Cancer Screening  07/26/2022 (Originally 1/23/2020)    Depression Screening  04/26/2023    BMI: Adult  04/26/2023    Annual Physical  04/26/2023    DTaP,Tdap,and Td Vaccines (2 - Td or Tdap) 07/22/2029    HIV Screening  Completed    Hepatitis C Screening  Completed    Influenza Vaccine  Completed    COVID-19 Vaccine  Completed    Pneumococcal Vaccine: Pediatrics (0 to 5 Years) and At-Risk Patients (6 to 59 Years)  Aged Out    HIB Vaccine  Aged Out    Hepatitis B Vaccine  Aged Out    IPV Vaccine  Aged Out    Hepatitis A Vaccine  Aged Out    Meningococcal ACWY Vaccine  Aged Out    HPV Vaccine  Aged Out         The following portions of the patient's history were reviewed and updated as appropriate: allergies, current medications, past family history, past medical history, past social history, past surgical history and problem list       Review of Systems     See other note  Current Outpatient Medications   Medication Sig Dispense Refill    cholecalciferol (VITAMIN D3) 1,000 units tablet Take 1,000 Units by mouth daily      levothyroxine 25 mcg tablet 1 pill by mouth once daily on an empty stomach, 30 minutes prior to eating food or drink  90 tablet 1    melatonin 3 mg Take 3 mg by mouth daily at bedtime 1-2 tabs by mouth QHS   Multiple Vitamin (MULTIVITAMINS PO) Take 1 tablet by mouth daily       Norethin-Eth Estrad-Fe Biphas (LO LOESTRIN FE) 1 MG-10 MCG / 10 MCG TABS Take 1 tablet by mouth daily Rx per Gyn      diclofenac sodium (VOLTAREN) 1 % Apply 2 g topically 4 (four) times a day (Patient not taking: Reported on 4/26/2022 ) 1 Tube 1    methocarbamol (ROBAXIN) 500 mg tablet Take 1 tablet (500 mg total) by mouth 2 (two) times a day as needed for muscle spasms (Patient not taking: Reported on 4/26/2022 ) 60 tablet 1    prochlorperazine (COMPAZINE) 10 mg tablet Take 1 tablet (10 mg total) by mouth every 8 (eight) hours as needed for nausea or vomiting (Migraine) (Patient not taking: Reported on 4/26/2022 ) 30 tablet 0    rizatriptan (MAXALT-MLT) 10 MG disintegrating tablet Take 1 tablet (10 mg total) by mouth once as needed for migraine (Max 30 mg/24 hrs ) May repeat in 2 hours if needed 9 tablet 0     No current facility-administered medications for this visit         Objective:  /86   Pulse 84   Temp 97 6 °F (36 4 °C)   Resp 16   Ht 5' 7" (1 702 m)   Wt 90 kg (198 lb 6 4 oz)   SpO2 100%   BMI 31 07 kg/m²    Wt Readings from Last 3 Encounters:   04/26/22 90 kg (198 lb 6 4 oz)   07/26/21 91 2 kg (201 lb)   05/19/21 89 4 kg (197 lb)      BP Readings from Last 3 Encounters:   04/26/22 120/86   07/26/21 108/70   10/21/20 116/78          Physical Exam      Vitals and nursing note reviewed  Constitutional:       Appearance: Normal appearance  She is well-developed  She is obese  HENT:      Head: Normocephalic and atraumatic  Right Ear: Tympanic membrane, ear canal and external ear normal       Left Ear: Tympanic membrane, ear canal and external ear normal       Nose: Nose normal       Right Sinus: No maxillary sinus tenderness or frontal sinus tenderness  Left Sinus: No maxillary sinus tenderness or frontal sinus tenderness  Mouth/Throat:      Mouth: Mucous membranes are moist       Pharynx: Oropharynx is clear  Uvula midline  Tonsils: No tonsillar exudate  Eyes:      Extraocular Movements: Extraocular movements intact  Conjunctiva/sclera: Conjunctivae normal       Pupils: Pupils are equal, round, and reactive to light  Cardiovascular:      Rate and Rhythm: Normal rate and regular rhythm  Pulses: Normal pulses  Heart sounds: Normal heart sounds  Pulmonary:      Effort: Pulmonary effort is normal       Breath sounds: Normal breath sounds  Abdominal:      General: Bowel sounds are normal  There is no distension  Palpations: Abdomen is soft  There is no mass  Tenderness: There is no abdominal tenderness  There is no guarding or rebound  Musculoskeletal:         General: Tenderness present  No swelling  Cervical back: Neck supple  Thoracic back: Tenderness (Central and B/L Transvers Processes T11-T12 ) and bony tenderness (Central and B/L Transvers Processes T11-T12) present  No swelling, edema, deformity, signs of trauma, lacerations or spasms  Decreased range of motion (B/L SB)  Right lower leg: No edema  Left lower leg: No edema  Lymphadenopathy:      Cervical: No cervical adenopathy  Skin:     Findings: No rash  Neurological:      General: No focal deficit present  Mental Status: She is alert and oriented to person, place, and time  Psychiatric:         Mood and Affect: Mood normal          Behavior: Behavior normal          Thought Content: Thought content normal          Judgment: Judgment normal      Lab Results:      Lab Results   Component Value Date    WBC 7 35 03/05/2022    HGB 14 2 03/05/2022    HCT 43 8 03/05/2022     03/05/2022    TRIG 86 03/05/2022    HDL 62 03/05/2022    LDLDIRECT 114 (H) 03/05/2022    ALT 21 03/05/2022    AST 17 03/05/2022     07/01/2014    K 4 2 03/05/2022     03/05/2022    CREATININE 0 94 03/05/2022    BUN 11 03/05/2022    CO2 26 03/05/2022    GLUF 86 03/05/2022    HGBA1C 4 8 07/22/2019     Lab Results   Component Value Date    URICACID 4 0 10/22/2020     Invalid input(s): BASENAME Vitamin D    No results found       POCT Labs

## 2022-05-16 ENCOUNTER — EVALUATION (OUTPATIENT)
Dept: PHYSICAL THERAPY | Facility: OTHER | Age: 32
End: 2022-05-16
Payer: COMMERCIAL

## 2022-05-16 DIAGNOSIS — M54.6 ACUTE BILATERAL THORACIC BACK PAIN: Primary | ICD-10-CM

## 2022-05-16 DIAGNOSIS — M54.42 CHRONIC MIDLINE LOW BACK PAIN WITH BILATERAL SCIATICA: ICD-10-CM

## 2022-05-16 DIAGNOSIS — G89.29 CHRONIC MIDLINE LOW BACK PAIN WITH BILATERAL SCIATICA: ICD-10-CM

## 2022-05-16 DIAGNOSIS — M51.16 LUMBAR DISC DISEASE WITH RADICULOPATHY: ICD-10-CM

## 2022-05-16 DIAGNOSIS — M47.816 LUMBAR SPONDYLOSIS: ICD-10-CM

## 2022-05-16 DIAGNOSIS — M54.41 CHRONIC MIDLINE LOW BACK PAIN WITH BILATERAL SCIATICA: ICD-10-CM

## 2022-05-16 PROCEDURE — 97140 MANUAL THERAPY 1/> REGIONS: CPT

## 2022-05-16 PROCEDURE — 97112 NEUROMUSCULAR REEDUCATION: CPT

## 2022-05-16 NOTE — PROGRESS NOTES
PT Evaluation     Today's date: 2022  Patient name: Maritza Loera  : 1990  MRN: [id number]  Referring provider: Madison Wesley DO  Dx:   Encounter Diagnosis     ICD-10-CM    1  Acute bilateral thoracic back pain  M54 6 Ambulatory Referral to Physical Therapy   2  Lumbar disc disease with radiculopathy  M51 16 Ambulatory Referral to Physical Therapy   3  Lumbar spondylosis  M47 [id number] Ambulatory Referral to Physical Therapy   4  Chronic midline low back pain with bilateral sciatica  M54 41 Ambulatory Referral to Physical Therapy    M54 42     G89 29                 Called pt to follow-up about progress with HEP, reported she was doing well  She is currently out of the country and didn't know when she would be back but speculated sometime in August, pt said she would call back then to schedule a follow-up  Discharged at this time and will perform re-eval when she returns  Assessment  Assessment details: Maritza Loera is a pleasant 32 y o  female who presents with thoracic back pain with concurrent chronic low back and hip pain  Patient's greatest concerns are preventing the problem from getting worse  Pt states she has had chronic back issues since she was about 15years old but recently noticed thoracic pain 2-3 months ago  She was diagnosed with facet loading syndrome as well as L5-S1 herniation, and was given a nerve block about 2 years ago  She describes the pain as radiating most of the time, but experiences shooting pain when brushing her teeth  doing the dishes, rolling up from laying on a bench at the gym, and overarching her back with lat pulldowns  She gets pain into her L glute, and numbness in her R 4th and 5th toes  She experiences tightness on both sides of her back  She also works at [employer] and experiences a lot of pain after sitting for a long time  She flies a lot for work which also aggravates her back pain   She has noticed with this new onset of upper back pain that her low back is more sore than usual and feels like her hips are uneven  She was diagnosed with R hip impingement 2 years ago  She reports doing strength training 6 days a week with additional cardio, yoga, and stretching exercises as well  She reports feeling better after working out, stretching, and sometimes taking Tylenol  She reports feeling weakness in her upper body, and is overall hypermobile  Pt has family history of cancer  Denies bowel or bladder changes, recent fever, or infection  Pt states she has a hard time sleeping through the night due to discomfort  Pt would like to be independent with exercises at home with follow-ups to PT as needed  Objectively, pt has near full lumbar ROM, pain during flexion, R rot, and L SB  She was TTP B QLs, R TFL, thoracic and lumbar paraspinals, periscapular musculature  Hip weakness B, R > L  Leg length discrepancy noted, L leg longer than R, corrected with L SIJ grV  Pt may be experiencing these symptoms and objective findings based on clinical presentation of thoracic pain associated with poor posture and lumbar hypermobility  Pt would benefit from skilled physical therapy services to address current deficits, improve quality of life, and restore PLOF with transition to home exercise program when appropriate  No referral necessary at this time  Primary Impairments:  1) thoracic pain  2) lumbar hypermobility  3) increased thoracic and periscapular tissue tension    Etiologic factors include chronic low back issues and poor posture  Positive prognostic indicators include age and positive attitude toward recovery  Negative prognostic indicators include obesity  Function Based Goals:  Patient will be independent with HEP upon discharge  Patient will be able to independently manage symptoms upon discharge  Patient will resume prior level of function and home ADLs with minimal to no symptoms upon discharge    Patient will be able to brush teeth and wash dishes with minimal to no symptoms upon discharge  Patient will be able to sit for at least 1 hour with minimal to no symptoms upon discharge  Patient will be able to work out with minimal to no symptoms upon discharge  Impairment Based Goals:  Patient will increase lumbar ROM to WNL in 3 weeks  Patient will increase B hip strength by at least 1/2 MMT grade in 3 weeks  Patient will demonstrate decreased tissue tension in QLs, R TFL, and thoracic and lumbar musculature as noted by therapist in 4 weeks  Patient will demonstrate even leg length without mobilization in 4 weeks           Impairments: abnormal coordination, abnormal muscle firing, abnormal muscle tone, abnormal or restricted ROM, abnormal movement, impaired physical strength, lacks appropriate home exercise program, pain with function and poor posture     Symptom irritability: lowBarriers to therapy:     Understanding of Dx/Px/POC: good   Prognosis: good    Plan  Plan details: Given HEP, follow-up in 1 weeks with other appointments variable due to work schedule and high copay  Patient would benefit from: skilled physical therapy  Referral necessary: No  Planned therapy interventions: abdominal trunk stabilization, activity modification, balance, body mechanics training, breathing training, coordination, flexibility, functional ROM exercises, home exercise program, therapeutic exercise, therapeutic activities, stretching, strengthening, postural training, patient education, neuromuscular re-education, motor coordination training, massage, manual therapy and joint mobilization  Frequency: 1x month  Duration in weeks: 12  Treatment plan discussed with: patient        Subjective Evaluation    History of Present Illness  Date of onset: 2022          Recurrent probem    Quality of life: good    Pain  Current pain ratin  At best pain ratin  At worst pain rating: 10  Location: thoracic pain   Quality: radiating  Relieving factors: relaxation, rest, support, change in position and medications  Aggravating factors: sitting  Progression: no change    Social Support  Lives with: spouse ( )    Employment status: working    Diagnostic Tests  No diagnostic tests performed  Treatments  Previous treatment: injection treatment, physical therapy and massage (nerve block L3-L5, L4 injection childhood  dry needling in the past)  Patient Goals  Patient goals for therapy: decreased pain, improved balance, return to sport/leisure activities and increased strength          Objective     General Comments:      Lumbar Comments  Spine ROM:  Flexion: 0% kirk, p! Low thoracic  PSIS mobility forward flexion: equal   Extension: 15% kirk, pressure  SB: L    0% kirk    R    15% kirk, p! Rot: L    0% kirk, slight p!    R    0% kirk     Squat: good mobility, pt reported tightness in hips  SL Balance: L  good     R  good   Posture: FHP, rounded shoulders  Palpation: TTP B QLs, R TFL, thoracic and lumbar paraspinals, periscapular musculature     LQS:  L4 reflex: L  1+     R  1+   S1 reflex: L   1+    R  1+  Dermatomes: L   2+    R   2+  Myotomes: L  2+     R   2+    Slump test: L  (-)     R   (-)     Hip ROM: full ROM in all directions, some tightness in B hip flexion     Hip MMT:  Flexion: L    4+/5     R    4/5, p! ER: L    5/5     R    5/5  IR: L    5/5     R    4/5, p!   Extension glute: L    4+/5     R    4+/5  Extension hamstring: L    4+/5     R    4+/5  Abduction: L    4+/5     R    4+/5     Scour: L (-)      R  (-)  PSLR: L  (-)     R  (-)   ASLR: L  (-)     R   (-)     Soft tissue: palpable tissue tension B QLs, R TFL, thoracic and lumbar paraspinals, periscapular musculature     LLD: L leg longer, corrected with L SIJ grV             Precautions: hypothyroidism  [id number]         Manuals 5/16            L SIJ grV KA            STM - paraspinals, B QLs, R TFL, periscapular musculature KA            Thoracic grIV KA                         Neuro Re-Ed             TrA 5"x30    10x B marches            Seated thoracic extensions 10"x10            scap squeezes 5"x30            Cervical retractions 5"x30            Bridges  trialed            Clamshells  trialed            SLR 3 way nv            SL bridge             Bridge on ball nv            Ther Ex             Seated upper trap stretch 30"x3            Seated levator trap stretch 30"x3            Hip flexor stretch 30"x3            Squats  nv            RDL nv            Leg press nv                                      Ther Activity                                       Gait Training                                       Modalities

## 2022-05-17 PROCEDURE — 97162 PT EVAL MOD COMPLEX 30 MIN: CPT

## 2022-05-26 ENCOUNTER — APPOINTMENT (OUTPATIENT)
Dept: PHYSICAL THERAPY | Facility: OTHER | Age: 32
End: 2022-05-26
Payer: COMMERCIAL

## 2022-09-12 DIAGNOSIS — G43.009 MIGRAINE WITHOUT AURA AND WITHOUT STATUS MIGRAINOSUS, NOT INTRACTABLE: ICD-10-CM

## 2022-09-12 RX ORDER — RIZATRIPTAN BENZOATE 10 MG/1
TABLET, ORALLY DISINTEGRATING ORAL
Qty: 9 TABLET | Refills: 0 | OUTPATIENT
Start: 2022-09-12

## 2022-09-13 RX ORDER — RIZATRIPTAN BENZOATE 10 MG/1
10 TABLET, ORALLY DISINTEGRATING ORAL ONCE AS NEEDED
Qty: 9 TABLET | Refills: 0 | Status: SHIPPED | OUTPATIENT
Start: 2022-09-13

## 2022-09-27 ENCOUNTER — EVALUATION (OUTPATIENT)
Dept: PHYSICAL THERAPY | Facility: OTHER | Age: 32
End: 2022-09-27
Payer: COMMERCIAL

## 2022-09-27 DIAGNOSIS — M54.50 LUMBAR PAIN: Primary | ICD-10-CM

## 2022-09-27 PROCEDURE — 97161 PT EVAL LOW COMPLEX 20 MIN: CPT

## 2022-09-27 PROCEDURE — 97112 NEUROMUSCULAR REEDUCATION: CPT

## 2022-09-27 PROCEDURE — 97140 MANUAL THERAPY 1/> REGIONS: CPT

## 2022-09-27 NOTE — PROGRESS NOTES
PT Evaluation     Today's date: 2022  Patient name: Anu Topete  : 1990  MRN: [id number]  Referring provider: Dante Desai DO  Dx:   Encounter Diagnosis     ICD-10-CM    1  Lumbar pain  M54 50                   Assessment  Assessment details: IE 2022: Pt came to PT in May for an initial evaluation of lumbar and thoracic pain  Pt travels for work for long periods of time and just recently was able to come back to therapy  She reports her thoracic pain is much better with postural correction exercises given to her at the first PT eval  She does report muscle spasms in her lower back 3-4x/days, mostly on her R side with pain into the QL and R glute  She has also noticed an increase in R calf muscle tightness  She reports an increase in spasms when sitting and has a lot of difficulty laying prone  Sleeping is very challenging due to pain  She also reports feeling tightness in the anterior R hip, with reports of snapping anteriorly as well  She especially gets clicking when laying supine doing leg work for core strengthening  She reported numbness and tingling in R 4th and 5th toes at her last eval but reported these symptoms are sporadic and have much improved, they increase when she is standing for too long or sitting  Denies bowel and bladder dysfunction, denies recent fever or infection  IE 2022 details: Anu Topete is a pleasant 32 y o  female who presents with thoracic back pain with concurrent chronic low back and hip pain  Patient's greatest concerns are preventing the problem from getting worse  Pt states she has had chronic back issues since she was about 15years old but recently noticed thoracic pain 2-3 months ago  She was diagnosed with facet loading syndrome as well as L5-S1 herniation, and was given a nerve block about 2 years ago  She describes the pain as radiating most of the time, but experiences shooting pain when brushing her teeth   doing the dishes, rolling up from laying on a bench at the gym, and overarching her back with lat pulldowns  She gets pain into her L glute, and numbness in her R 4th and 5th toes  She experiences tightness on both sides of her back  She also works at [employer] and experiences a lot of pain after sitting for a long time  She flies a lot for work which also aggravates her back pain  She has noticed with this new onset of upper back pain that her low back is more sore than usual and feels like her hips are uneven  She was diagnosed with R hip impingement 2 years ago  She reports doing strength training 6 days a week with additional cardio, yoga, and stretching exercises as well  She reports feeling better after working out, stretching, and sometimes taking Tylenol  She reports feeling weakness in her upper body, and is overall hypermobile  Pt has family history of cancer  Denies bowel or bladder changes, recent fever, or infection  Pt states she has a hard time sleeping through the night due to discomfort  Pt would like to be independent with exercises at home with follow-ups to PT as needed  Objectively, pt has near full lumbar ROM, pain during extension, B rot, and B SB  She was TTP B QLs and B TFLs  Good hip strength B  Leg length discrepancy noted, R leg longer than L, corrected with R hip LAD grV  Positive slump test B  Pain with scour, FADIR, and B hip IR PROM  P-A painful L1-L5  Pt may be experiencing these symptoms and objective findings based on clinical presentation of lumbar pain secondary to muscular imbalance  Pt would benefit from skilled physical therapy services to address current deficits, improve quality of life, and restore PLOF with transition to home exercise program when appropriate  Pt educated on sleeping position in sidelying with pillow propped and educated on HEP  No referral necessary at this time       Primary Impairments:  1) lumbar pain  2) lumbar hypomobility  3) increased QL and TFL tissue tension    Etiologic factors include chronic low back issues and poor posture  Positive prognostic indicators include age and positive attitude toward recovery  Negative prognostic indicators include obesity  Function Based Goals:  Patient will be independent with HEP upon discharge  Patient will be able to independently manage symptoms upon discharge  Patient will resume prior level of function and home ADLs with minimal to no symptoms upon discharge  Patient will be able to sit for at least 1 hour with minimal to no symptoms upon discharge  Patient will be able to work out with minimal to no symptoms upon discharge  Patient will be able to sleep through the night without symptoms upon discharge  Impairment Based Goals:  Patient will increase lumbar ROM to WNL in 3 weeks  Patient will increase B hip abductor strength by at least 1/2 MMT grade in 3 weeks  Patient will demonstrate decreased tissue tension in QLs, R TFL, and thoracic and lumbar musculature as noted by therapist in 4 weeks  Patient will demonstrate even leg length without mobilization in 4 weeks  Impairments: abnormal coordination, abnormal muscle firing, abnormal muscle tone, abnormal or restricted ROM, abnormal movement, impaired physical strength, lacks appropriate home exercise program, pain with function and poor posture     Symptom irritability: lowBarriers to therapy:           Understanding of Dx/Px/POC: good   Prognosis: good    Plan  Plan details:  Follow-up as patient able, given HEP  Patient would benefit from: skilled physical therapy  Referral necessary: No  Planned therapy interventions: abdominal trunk stabilization, activity modification, balance, body mechanics training, breathing training, coordination, flexibility, functional ROM exercises, home exercise program, therapeutic exercise, therapeutic activities, stretching, strengthening, postural training, patient education, neuromuscular re-education, motor coordination training, massage, manual therapy and joint mobilization  Frequency: 1x month  Duration in weeks: 12  Treatment plan discussed with: patient        Subjective Evaluation    History of Present Illness  Date of onset: 2022          Recurrent probem    Quality of life: good    Pain  Current pain rating: 3  At best pain ratin  At worst pain ratin  Location: R sided lumbar pain  Quality: radiating and sharp  Relieving factors: relaxation, rest, support, change in position and medications  Aggravating factors: sitting  Progression: no change    Social Support  Lives with: spouse ( )    Employment status: working    Diagnostic Tests  No diagnostic tests performed  Treatments  Previous treatment: injection treatment, physical therapy and massage (nerve block L3-L5, L4 injection childhood  dry needling in the past)  Patient Goals  Patient goals for therapy: decreased pain, improved balance, return to sport/leisure activities and increased strength          Objective     General Comments:      Lumbar Comments  Spine ROM:  Flexion: 0% kirk  PSIS mobility forward flexion: equal but R hip IR  Extension: 15% kirk, p! SB: L    0% kirk, p!    R    0% kirk, p! Rot: L    0% kirk, slight p!    R    0% kirk, p! In L     Squat: good mobility, pt reported pinching in R hip   SL Balance: L  good     R  good   Posture: FHP, rounded shoulders  Palpation: TTP B QLs, B TFL, thoracic and lumbar paraspinals    Slump test: L  (+)     R   (+)     Hip ROM: full ROM in all directions, p!  With B IR     Hip MMT:  Flexion: L   5/5     R    5/5  ER: L    5/5     R    5/5  IR: L    5/5     R    5/5  Extension glute: L    5/5     R    5/5  Extension hamstring: L    5/5     R    5/5  Abduction: L    4+/5     R    4+/5     Scour: L (+)      R  (+)  PSLR: L  (-)     R   (-)  FADIR: L (+)     R (+)     Lumbar P-A painful L1-L5, not painful with sacral P-A   LLD: R leg longer, corrected with R hip LAD grV               Precautions: hypothyroidism  [id number]         Manuals 5/16 9/27           L SIJ grV KA             STM - paraspinals, B QLs, R TFL, periscapular musculature KA KA           Thoracic grIV KA            Lumbar P-A grIII-IV  KA           R hip LAD grV  KA           Hip distraction with belt mobilization  nv           Neuro Re-Ed             TrA 5"x30    10x B marches 5"x30           Seated thoracic extensions 10"x10            scap squeezes 5"x30            Cervical retractions 5"x30            Bridges  trialed            Clamshells  trialed            SLR 3 way nv            SL bridge  5"x5 B           Bridge on ball nv            Slump sliders  10x B           Sitting hip flexor march  10x B           Ther Ex             Seated upper trap stretch 30"x3            Seated levator trap stretch 30"x3            Hip flexor stretch 30"x3            Squats  nv            RDL nv            Leg press nv            TFL ball release  1' ea                                     Ther Activity                                       Gait Training                                       Modalities

## 2022-11-29 DIAGNOSIS — E03.9 HYPOTHYROIDISM, UNSPECIFIED TYPE: ICD-10-CM

## 2022-11-29 RX ORDER — LEVOTHYROXINE SODIUM 0.03 MG/1
TABLET ORAL
Qty: 90 TABLET | Refills: 0 | Status: SHIPPED | OUTPATIENT
Start: 2022-11-29

## 2023-03-04 ENCOUNTER — LAB (OUTPATIENT)
Dept: LAB | Age: 33
End: 2023-03-04

## 2023-03-04 ENCOUNTER — TELEPHONE (OUTPATIENT)
Dept: FAMILY MEDICINE CLINIC | Facility: CLINIC | Age: 33
End: 2023-03-04

## 2023-03-04 DIAGNOSIS — E66.9 OBESITY, UNSPECIFIED CLASSIFICATION, UNSPECIFIED OBESITY TYPE, UNSPECIFIED WHETHER SERIOUS COMORBIDITY PRESENT: ICD-10-CM

## 2023-03-04 DIAGNOSIS — E78.5 HYPERLIPIDEMIA, UNSPECIFIED HYPERLIPIDEMIA TYPE: ICD-10-CM

## 2023-03-04 DIAGNOSIS — E03.9 PRIMARY HYPOTHYROIDISM: ICD-10-CM

## 2023-03-04 DIAGNOSIS — G43.009 MIGRAINE WITHOUT AURA AND WITHOUT STATUS MIGRAINOSUS, NOT INTRACTABLE: ICD-10-CM

## 2023-03-04 LAB
ALBUMIN SERPL BCP-MCNC: 3.8 G/DL (ref 3.5–5)
ALP SERPL-CCNC: 52 U/L (ref 46–116)
ALT SERPL W P-5'-P-CCNC: 19 U/L (ref 12–78)
ANION GAP SERPL CALCULATED.3IONS-SCNC: 2 MMOL/L (ref 4–13)
AST SERPL W P-5'-P-CCNC: 18 U/L (ref 5–45)
BILIRUB SERPL-MCNC: 0.38 MG/DL (ref 0.2–1)
BUN SERPL-MCNC: 13 MG/DL (ref 5–25)
CALCIUM SERPL-MCNC: 9.6 MG/DL (ref 8.3–10.1)
CHLORIDE SERPL-SCNC: 108 MMOL/L (ref 96–108)
CHOLEST SERPL-MCNC: 188 MG/DL
CO2 SERPL-SCNC: 26 MMOL/L (ref 21–32)
CREAT SERPL-MCNC: 0.91 MG/DL (ref 0.6–1.3)
GFR SERPL CREATININE-BSD FRML MDRD: 83 ML/MIN/1.73SQ M
GLUCOSE P FAST SERPL-MCNC: 92 MG/DL (ref 65–99)
HDLC SERPL-MCNC: 67 MG/DL
LDLC SERPL CALC-MCNC: 99 MG/DL (ref 0–100)
LDLC SERPL DIRECT ASSAY-MCNC: 99 MG/DL (ref 0–100)
MAGNESIUM SERPL-MCNC: 2.7 MG/DL (ref 1.6–2.6)
NONHDLC SERPL-MCNC: 121 MG/DL
POTASSIUM SERPL-SCNC: 4.1 MMOL/L (ref 3.5–5.3)
PROT SERPL-MCNC: 7.3 G/DL (ref 6.4–8.4)
SODIUM SERPL-SCNC: 136 MMOL/L (ref 135–147)
T4 FREE SERPL-MCNC: 0.96 NG/DL (ref 0.76–1.46)
TRIGL SERPL-MCNC: 109 MG/DL
TSH SERPL DL<=0.05 MIU/L-ACNC: 5.11 UIU/ML (ref 0.45–4.5)

## 2023-03-05 NOTE — RESULT ENCOUNTER NOTE
Pt is requesting an urgent refill of the below medication. Pt is leaving for out of town on 3/4/17. Please send new Rx to [facility] in [place].      folic acid (FOLVITE) 1 MG tablet 180 tablet 3      Sig: Take 2 tablets by mouth once daily        Unstable labs - will review with patient at upcoming appointment  Low Anion Gap - Repeat nonfasting CMP in 1 month  HypOthyroidism - To consider increase Synthroid and repeat labs in 6 weeks  Hypermagnesemia - Decrease dietary Magnesium

## 2023-03-05 NOTE — TELEPHONE ENCOUNTER
Please schedule overdue appt  Labs done 3/4/23  She will be due for physical 4/26/23  Return in about 6 months (around 10/26/2022) for Physical / 6mo - Hypothyroid, Migraines, Myalgias, Obesity, Labs

## 2023-03-06 DIAGNOSIS — E03.9 HYPOTHYROIDISM, UNSPECIFIED TYPE: ICD-10-CM

## 2023-03-06 DIAGNOSIS — G43.009 MIGRAINE WITHOUT AURA AND WITHOUT STATUS MIGRAINOSUS, NOT INTRACTABLE: ICD-10-CM

## 2023-03-06 RX ORDER — RIZATRIPTAN BENZOATE 10 MG/1
10 TABLET, ORALLY DISINTEGRATING ORAL ONCE AS NEEDED
Qty: 9 TABLET | Refills: 0 | Status: SHIPPED | OUTPATIENT
Start: 2023-03-06

## 2023-03-06 RX ORDER — LEVOTHYROXINE SODIUM 0.03 MG/1
TABLET ORAL
Qty: 90 TABLET | Refills: 0 | Status: SHIPPED | OUTPATIENT
Start: 2023-03-06

## 2023-04-06 DIAGNOSIS — G43.009 MIGRAINE WITHOUT AURA AND WITHOUT STATUS MIGRAINOSUS, NOT INTRACTABLE: ICD-10-CM

## 2023-04-06 RX ORDER — RIZATRIPTAN BENZOATE 10 MG/1
TABLET, ORALLY DISINTEGRATING ORAL
Qty: 9 TABLET | Refills: 0 | Status: SHIPPED | OUTPATIENT
Start: 2023-04-06

## 2023-05-02 ENCOUNTER — OFFICE VISIT (OUTPATIENT)
Dept: FAMILY MEDICINE CLINIC | Facility: CLINIC | Age: 33
End: 2023-05-02

## 2023-05-02 VITALS
WEIGHT: 200.6 LBS | SYSTOLIC BLOOD PRESSURE: 124 MMHG | HEART RATE: 72 BPM | TEMPERATURE: 98.2 F | OXYGEN SATURATION: 99 % | RESPIRATION RATE: 18 BRPM | DIASTOLIC BLOOD PRESSURE: 76 MMHG | BODY MASS INDEX: 31.48 KG/M2 | HEIGHT: 67 IN

## 2023-05-02 DIAGNOSIS — M51.16 LUMBAR DISC DISEASE WITH RADICULOPATHY: ICD-10-CM

## 2023-05-02 DIAGNOSIS — E78.5 HYPERLIPIDEMIA, UNSPECIFIED HYPERLIPIDEMIA TYPE: ICD-10-CM

## 2023-05-02 DIAGNOSIS — E03.9 HYPOTHYROIDISM, UNSPECIFIED TYPE: ICD-10-CM

## 2023-05-02 DIAGNOSIS — D50.9 IRON DEFICIENCY ANEMIA, UNSPECIFIED IRON DEFICIENCY ANEMIA TYPE: ICD-10-CM

## 2023-05-02 DIAGNOSIS — G43.009 MIGRAINE WITHOUT AURA AND WITHOUT STATUS MIGRAINOSUS, NOT INTRACTABLE: ICD-10-CM

## 2023-05-02 DIAGNOSIS — N94.6 DYSMENORRHEA: ICD-10-CM

## 2023-05-02 DIAGNOSIS — Z12.4 CERVICAL CANCER SCREENING: ICD-10-CM

## 2023-05-02 DIAGNOSIS — Z00.00 ANNUAL PHYSICAL EXAM: Primary | ICD-10-CM

## 2023-05-02 DIAGNOSIS — E66.9 CLASS 1 OBESITY WITHOUT SERIOUS COMORBIDITY WITH BODY MASS INDEX (BMI) OF 31.0 TO 31.9 IN ADULT, UNSPECIFIED OBESITY TYPE: ICD-10-CM

## 2023-05-02 DIAGNOSIS — M79.10 MYALGIA: ICD-10-CM

## 2023-05-02 RX ORDER — LEVOTHYROXINE SODIUM 0.03 MG/1
TABLET ORAL
Qty: 90 TABLET | Refills: 0 | Status: CANCELLED | OUTPATIENT
Start: 2023-05-02

## 2023-05-02 RX ORDER — LEVOTHYROXINE SODIUM 0.05 MG/1
50 TABLET ORAL DAILY
Qty: 30 TABLET | Refills: 1 | Status: SHIPPED | OUTPATIENT
Start: 2023-05-02

## 2023-05-02 NOTE — PROGRESS NOTES
Assessment/Plan:  Problem List Items Addressed This Visit        Endocrine    Hypothyroid     Hypothyroidism  Increase Synthroid 50 mcg daily  Relevant Medications    levothyroxine (Synthroid) 50 mcg tablet    Other Relevant Orders    TSH, 3rd generation with Free T4 reflex    TSH, 3rd generation with Free T4 reflex       Cardiovascular and Mediastinum    Migraines     Stable on Maxalt MLT 10mg PRN and Compazine 10mg PRN  For headache and migraine prevention I would suggest a combination vitamin supplement which may include 1 or more of the following ingredients:  Magnesium 400 mg , Riboflavin (vitamin B2) 400 - 600 mg,  Butterbur 150 mg (PA free)  Other ingredients that may be helpful are feverfew, coenzyme Q10 100 mg three times a day,  melatonin and christina  Some example brands that combine some of these ingredients are Migravent or Dolovent  Relevant Orders    Magnesium       Nervous and Auditory    Lumbar disc disease with radiculopathy     Management per Pain Management - overdue for appointment  Improved s/p radiofrequency ablation  Genitourinary    Dysmenorrhea     On OCP per Gyn  Other    Class 1 obesity without serious comorbidity with body mass index (BMI) of 31 0 to 31 9 in adult     Recommend lifestyle modifications  Patient previously declined weight management consult  Relevant Orders    TSH, 3rd generation with Free T4 reflex    Vitamin D 25 hydroxy    Hyperlipidemia     Stable s statin  Recommend lifestyle modifications  Relevant Orders    Comprehensive metabolic panel    Lipid panel    TSH, 3rd generation with Free T4 reflex    LDL cholesterol, direct    Iron deficiency anemia     Stable  Continue MVI and OCP c Fe            Other Visit Diagnoses     Annual physical exam    -  Primary    Health Maintenance   Topic Date Due    Cervical Cancer Screening  01/23/2020    COVID-19 Vaccine (4 - Booster for Keyes Peter series) 08/02/2023 (Originally 2/22/2022)    Influenza Vaccine (Season Ended) 09/01/2023    Depression Screening  05/02/2024    BMI: Followup Plan  05/02/2024    BMI: Adult  05/02/2024    Annual Physical  05/02/2024    DTaP,Tdap,and Td Vaccines (2 - Td or Tdap) 07/22/2029    HIV Screening  Completed    Hepatitis C Screening  Completed    Pneumococcal Vaccine: Pediatrics (0 to 5 Years) and At-Risk Patients (6 to 59 Years)  Aged Out    HIB Vaccine  Aged Out    IPV Vaccine  Aged Out    Hepatitis A Vaccine  Aged Out    Meningococcal ACWY Vaccine  Aged Out    HPV Vaccine  Aged Out         BMI 31 0-31 9,adult        Myalgia        Relevant Orders    Vitamin D 25 hydroxy    Cervical cancer screening        Relevant Orders    Ambulatory Referral to Gynecology           Return in about 6 months (around 11/2/2023) for 6mo - Hypothyroid, Migraines, Myalgias, Obesity, Labs  No future appointments  Subjective:     Patrice is a 28 y o  female who presents today for a follow-up on her chronic medical conditions  HPI:  Chief Complaint   Patient presents with    Physical Exam     Physical / 6mo - Hypothyroid, Migraines, Myalgias, Obesity, Labs  No new problems or concerns   HM     Needs referral for GYN, pending  Declines covid boosters at this time  -- Above per clinical staff and reviewed  --      HPI      Today:      Return in about 6 months (around 10/26/2022) for 6mo - Hypothyroid, Migraines, Myalgias, Obesity, Labs  6mo OV - Overdue       Obesity - Watching diet   +Exercise - Works out [employer], lifting, 7 days per week  She is no longer working with a dietician at her gym  Hypothyroidism - Lost Rx for 2 days  Otherwise, taking Synthroid regularly and properly    Negative Thyroid antibodies 12/12/20           H/O Fe Def Anemia - used to take oral supplements, then D/C due to GI upset and symptom improvement   On MVI and OCP contains iron        Palpitations - Occurs 1-2 times per week, previously occurring nightly   Worse c increased stress  Last saw Yee Duarte 19, 20    Stable stress test 19   Last Holter monitor 20 - Stable        Migraines - On Compazine 10mg q8 hours PRN - has not used recently, and Maxalt-MLT 10mg PRN - not needing to us 2nd dose - helpful   Improved with improvement in back pain - sleeping better with Melatonin 3-6mg nightly   Symptoms since 13yo   Migraines occur 2-3 times every 4 weeks  Using Excedrin PRN - helpful   Improves c sleep, ice pack, bitemporal massage   Denies aura   Occurs upon awakening in the AM or at the end of the day   Bi-temporal headache, across forhead, in band distribution   Intense pressure   Worse c movement, photophobia, phonophobia   Sometimes has associated nausea and vertigo   Sometimes lasts 6-12 hours   Pushes fluids - about  oz water daily   Previously on Elavil at 13yo - made her paul        PHQ-2/9 Depression Screening    Little interest or pleasure in doing things: 0 - not at all  Feeling down, depressed, or hopeless: 0 - not at all  Trouble falling or staying asleep, or sleeping too much: 0 - not at all  Feeling tired or having little energy: 1 - several days  Poor appetite or overeatin - not at all  Feeling bad about yourself - or that you are a failure or have let yourself or your family down: 0 - not at all  Trouble concentrating on things, such as reading the newspaper or watching television: 0 - not at all  Moving or speaking so slowly that other people could have noticed   Or the opposite - being so fidgety or restless that you have been moving around a lot more than usual: 0 - not at all  Thoughts that you would be better off dead, or of hurting yourself in some way: 0 - not at all  PHQ-2 Score: 0  PHQ-2 Interpretation: Negative depression screen  PHQ-9 Score: 1   PHQ-9 Interpretation: No or Minimal depression          LINDA-7 Flowsheet Screening    Flowsheet Row Most Recent Value   Over the last 2 weeks, how often have you been bothered by any of the following problems? Feeling nervous, anxious, or on edge 0   Not being able to stop or control worrying 0   Worrying too much about different things 0   Trouble relaxing 0   Being so restless that it is hard to sit still 0   Becoming easily annoyed or irritable 0   Feeling afraid as if something awful might happen 0   LINDA-7 Total Score 0           Herniated Lumbar Disc - Management per Pain Management per Dr Linnea Felix for back pain   Overdue for appt 7/20   Stable with exercise   Still feels tightness in her back   Feels as though her muscles spasm so much 1-2 days after exercise to pull her shoulder and knee cap out of joint   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   Referred to Physiatry   s/p LAINEY at 13yo in [facility] - helpful   Saw [facility] Physiatry [facility] 9/24/19, s/p Lumbar Spine MRI 10/5/19 -   Showed small broad-based central perfusion protrusion type disc herniation at L5/S1   No significant central foraminal narrowing, s/p facet radio-frequency ablation 1/8/20 per pain Management           Reviewed:  Labs 3/4/23, Cardio 5/9/19, Gyn 1/22/18, Physiatry 10/5/19, Pain Management 5/19/20, Rheum 2/18/21     Dysmenorrhea -[facility]  Genove[facility] Irina QUIROZ at [facility] yearly   Next appt 1/19 - overdue   Skips placebo week of OCP        Sees Dentist q6 months  Sees Optho q2 years  The following portions of the patient's history were reviewed and updated as appropriate: allergies, current medications, past family history, past medical history, past social history, past surgical history and problem list       Review of Systems   Constitutional: Negative for appetite change, chills, diaphoresis, fatigue and fever  Respiratory: Negative for chest tightness and shortness of breath  Cardiovascular: Positive for palpitations  Negative for chest pain  Gastrointestinal: Negative for abdominal pain, blood in stool, diarrhea, nausea and vomiting  "  Genitourinary: Negative for dysuria  Current Outpatient Medications   Medication Sig Dispense Refill    cholecalciferol (VITAMIN D3) 1,000 units tablet Take 1,000 Units by mouth daily      levothyroxine (Synthroid) 50 mcg tablet Take 1 tablet (50 mcg total) by mouth daily 30 tablet 1    melatonin 3 mg Take 3 mg by mouth daily at bedtime 1-2 tabs by mouth QHS   Multiple Vitamin (MULTIVITAMINS PO) Take 1 tablet by mouth daily       Norethin-Eth Estrad-Fe Biphas 1 MG-10 MCG / 10 MCG TABS Take 1 tablet by mouth daily Rx per Gyn      prochlorperazine (COMPAZINE) 10 mg tablet Take 1 tablet (10 mg total) by mouth every 8 (eight) hours as needed for nausea or vomiting (Migraine) 30 tablet 0    rizatriptan (MAXALT-MLT) 10 mg disintegrating tablet TAKE 1 TAB BY MOUTH ONCE AS NEEDED FOR MIGRAINE (MAX 30 MG/24 HRS ) MAY REPEAT IN 2 HOURS IF NEEDED 9 tablet 0     No current facility-administered medications for this visit  Objective:  /76   Pulse 72   Temp 98 2 °F (36 8 °C)   Resp 18   Ht 5' 7\" (1 702 m)   Wt 91 kg (200 lb 9 6 oz)   SpO2 99%   BMI 31 42 kg/m²    Wt Readings from Last 3 Encounters:   05/02/23 91 kg (200 lb 9 6 oz)   04/26/22 90 kg (198 lb 6 4 oz)   07/26/21 91 2 kg (201 lb)      BP Readings from Last 3 Encounters:   05/02/23 124/76   04/26/22 120/86   07/26/21 108/70          Physical Exam  Vitals and nursing note reviewed  Constitutional:       Appearance: Normal appearance  She is well-developed  She is obese  HENT:      Head: Normocephalic and atraumatic  Right Ear: Tympanic membrane, ear canal and external ear normal       Left Ear: Tympanic membrane, ear canal and external ear normal       Nose: Nose normal       Right Sinus: No maxillary sinus tenderness or frontal sinus tenderness  Left Sinus: No maxillary sinus tenderness or frontal sinus tenderness  Mouth/Throat:      Mouth: Mucous membranes are moist       Pharynx: Oropharynx is clear   Uvula " midline  Tonsils: No tonsillar exudate  Eyes:      Extraocular Movements: Extraocular movements intact  Conjunctiva/sclera: Conjunctivae normal       Pupils: Pupils are equal, round, and reactive to light  Cardiovascular:      Rate and Rhythm: Normal rate and regular rhythm  Pulses: Normal pulses  Heart sounds: Normal heart sounds  Pulmonary:      Effort: Pulmonary effort is normal       Breath sounds: Normal breath sounds  Abdominal:      General: Bowel sounds are normal  There is no distension  Palpations: Abdomen is soft  There is no mass  Tenderness: There is no abdominal tenderness  There is no guarding or rebound  Musculoskeletal:         General: No swelling or tenderness  Cervical back: Neck supple  Right lower leg: No edema  Left lower leg: No edema  Lymphadenopathy:      Cervical: No cervical adenopathy  Skin:     Findings: No rash  Neurological:      General: No focal deficit present  Mental Status: She is alert and oriented to person, place, and time  Psychiatric:         Mood and Affect: Mood normal          Behavior: Behavior normal          Thought Content: Thought content normal          Judgment: Judgment normal          Lab Results:      Lab Results   Component Value Date    WBC 7 35 03/05/2022    HGB 14 2 03/05/2022    HCT 43 8 03/05/2022     03/05/2022    TRIG 109 03/04/2023    HDL 67 03/04/2023    LDLDIRECT 99 03/04/2023    ALT 19 03/04/2023    AST 18 03/04/2023     07/01/2014    K 4 1 03/04/2023     03/04/2023    CREATININE 0 91 03/04/2023    BUN 13 03/04/2023    CO2 26 03/04/2023    GLUF 92 03/04/2023    HGBA1C 4 8 07/22/2019     Lab Results   Component Value Date    URICACID 4 0 10/22/2020     Invalid input(s): BASENAME Vitamin D    No results found  POCT Labs      BMI Counseling: Body mass index is 31 42 kg/m²   The BMI is above normal  Nutrition recommendations include encouraging healthy choices of fruits and vegetables  Exercise recommendations include exercising 3-5 times per week  Rationale for BMI follow-up plan is due to patient being overweight or obese  Depression Screening and Follow-up Plan: Patient was screened for depression during today's encounter  They screened negative with a PHQ-2 score of 0

## 2023-05-02 NOTE — PATIENT INSTRUCTIONS
Please contact your insurance if you are uncertain of coverage for plan of care items  Your insurance may not cover the cost of your Vitamin D blood test, which is approximately $65-70  Please notify the lab prior to blood draw if you would like to decline this test           Wellness Visit for Adults   AMBULATORY CARE:   A wellness visit  is when you see your healthcare provider to get screened for health problems  Your healthcare provider will also give you advice on how to stay healthy  Write down your questions so you remember to ask them  Ask your healthcare provider how often you should have a wellness visit  What happens at a wellness visit:  Your healthcare provider will ask about your health, and your family history of health problems  This includes high blood pressure, heart disease, and cancer  He or she will ask if you have symptoms that concern you, if you smoke, and about your mood  You may also be asked about your intake of medicines, supplements, food, and alcohol  Any of the following may be done: Your weight  will be checked  Your height may also be checked so your body mass index (BMI) can be calculated  Your BMI shows if you are at a healthy weight  Your blood pressure  and heart rate will be checked  Your temperature may also be checked  Blood and urine tests  may be done  Blood tests may be done to check your cholesterol levels  Abnormal cholesterol levels increase your risk for heart disease and stroke  You may also need a blood or urine test to check for diabetes if you are at increased risk  Urine tests may be done to look for signs of an infection or kidney disease  A physical exam  includes checking your heartbeat and lungs with a stethoscope  Your healthcare provider may also check your skin to look for sun damage  Screening tests  may be recommended  A screening test is done to check for diseases that may not cause symptoms   The screening tests you may need depend on your age, gender, family history, and lifestyle habits  For example, colorectal screening may be recommended if you are 48years old or older  Screening tests you need if you are a woman:   A Pap smear  is used to screen for cervical cancer  Pap smears are usually done every 3 to 5 years depending on your age  You may need them more often if you have had abnormal Pap smear test results in the past  Ask your healthcare provider how often you should have a Pap smear  A mammogram  is an x-ray of your breasts to screen for breast cancer  Experts recommend mammograms every 2 years starting at age 48 years  You may need a mammogram at age 52 years or younger if you have an increased risk for breast cancer  Talk to your healthcare provider about when you should start having mammograms and how often you need them  Vaccines you may need:   Get an influenza vaccine  every year  The influenza vaccine protects you from the flu  Several types of viruses cause the flu  The viruses change over time, so new vaccines are made each year  Get a tetanus-diphtheria (Td) booster vaccine  every 10 years  This vaccine protects you against tetanus and diphtheria  Tetanus is a severe infection that may cause painful muscle spasms and lockjaw  Diphtheria is a severe bacterial infection that causes a thick covering in the back of your mouth and throat  Get a human papillomavirus (HPV) vaccine  if you are female and aged 23 to 32 or male 23 to 24 and never received it  This vaccine protects you from HPV infection  HPV is the most common infection spread by sexual contact  HPV may also cause vaginal, penile, and anal cancers  Get a pneumococcal vaccine  if you are aged 72 years or older  The pneumococcal vaccine is an injection given to protect you from pneumococcal disease  Pneumococcal disease is an infection caused by pneumococcal bacteria  The infection may cause pneumonia, meningitis, or an ear infection      Get a shingles vaccine  if you are 61 or older, even if you have had shingles before  The shingles vaccine is an injection to protect you from the varicella-zoster virus  This is the same virus that causes chickenpox  Shingles is a painful rash that develops in people who had chickenpox or have been exposed to the virus  How to eat healthy:  My Plate is a model for planning healthy meals  It shows the types and amounts of foods that should go on your plate  Fruits and vegetables make up about half of your plate, and grains and protein make up the other half  A serving of dairy is included on the side of your plate  The amount of calories and serving sizes you need depends on your age, gender, weight, and height  Examples of healthy foods are listed below:  Eat a variety of vegetables  such as dark green, red, and orange vegetables  You can also include canned vegetables low in sodium (salt) and frozen vegetables without added butter or sauces  Eat a variety of fresh fruits , canned fruit in 100% juice, frozen fruit, and dried fruit  Include whole grains  At least half of the grains you eat should be whole grains  Examples include whole-wheat bread, wheat pasta, brown rice, and whole-grain cereals such as oatmeal     Eat a variety of protein foods such as seafood (fish and shellfish), lean meat, and poultry without skin (turkey and chicken)  Examples of lean meats include pork leg, shoulder, or tenderloin, and beef round, sirloin, tenderloin, and extra lean ground beef  Other protein foods include eggs and egg substitutes, beans, peas, soy products, nuts, and seeds  Choose low-fat dairy products such as skim or 1% milk or low-fat yogurt, cheese, and cottage cheese  Limit unhealthy fats  such as butter, hard margarine, and shortening  Exercise:  Exercise at least 30 minutes per day on most days of the week  Some examples of exercise include walking, biking, dancing, and swimming   You can also fit in more physical activity by taking the stairs instead of the elevator or parking farther away from stores  Include muscle strengthening activities 2 days each week  Regular exercise provides many health benefits  It helps you manage your weight, and decreases your risk for type 2 diabetes, heart disease, stroke, and high blood pressure  Exercise can also help improve your mood  Ask your healthcare provider about the best exercise plan for you  General health and safety guidelines:   Do not smoke  Nicotine and other chemicals in cigarettes and cigars can cause lung damage  Ask your healthcare provider for information if you currently smoke and need help to quit  E-cigarettes or smokeless tobacco still contain nicotine  Talk to your healthcare provider before you use these products  Limit alcohol  A drink of alcohol is 12 ounces of beer, 5 ounces of wine, or 1½ ounces of liquor  Lose weight, if needed  Being overweight increases your risk of certain health conditions  These include heart disease, high blood pressure, type 2 diabetes, and certain types of cancer  Protect your skin  Do not sunbathe or use tanning beds  Use sunscreen with a SPF 15 or higher  Apply sunscreen at least 15 minutes before you go outside  Reapply sunscreen every 2 hours  Wear protective clothing, hats, and sunglasses when you are outside  Drive safely  Always wear your seatbelt  Make sure everyone in your car wears a seatbelt  A seatbelt can save your life if you are in an accident  Do not use your cell phone when you are driving  This could distract you and cause an accident  Pull over if you need to make a call or send a text message  Practice safe sex  Use latex condoms if are sexually active and have more than one partner  Your healthcare provider may recommend screening tests for sexually transmitted infections (STIs)  Wear helmets, lifejackets, and protective gear    Always wear a helmet when you ride a bike or motorcycle, go skiing, or play sports that could cause a head injury  Wear protective equipment when you play sports  Wear a lifejacket when you are on a boat or doing water sports  © Copyright Izabella Kenny 2022 Information is for End User's use only and may not be sold, redistributed or otherwise used for commercial purposes  The above information is an  only  It is not intended as medical advice for individual conditions or treatments  Talk to your doctor, nurse or pharmacist before following any medical regimen to see if it is safe and effective for you  Obesity   AMBULATORY CARE:   Obesity  means your body mass index (BMI) is greater than 30  Your healthcare provider will use your age, height, and weight to measure your BMI  The risks of obesity include  many health problems, including injuries or physical disability  Diabetes (high blood sugar level)    High blood pressure or high cholesterol    Heart disease    Stroke    Gallbladder or liver disease    Cancer of the colon, breast, prostate, liver, or kidney    Sleep apnea    Arthritis or gout    Screening  is done to check for health conditions before you have signs or symptoms  If you are 28to 79years old, your blood sugar level may be checked every 3 years for signs of prediabetes or diabetes  Your healthcare provider will check your blood pressure at each visit  High blood pressure can lead to a stroke or other problems  Your provider may check for signs of heart disease, cancer, or other health problems  Seek care immediately if:   You have a severe headache, confusion, or difficulty speaking  You have weakness on one side of your body  You have chest pain, sweating, or shortness of breath  Call your doctor if:   You have symptoms of gallbladder or liver disease, such as pain in your upper abdomen  You have knee or hip pain and discomfort while walking      You have symptoms of diabetes, such as intense hunger and thirst, and frequent urination  You have symptoms of sleep apnea, such as snoring or daytime sleepiness  You have questions or concerns about your condition or care  Treatment for obesity  focuses on helping you lose weight to improve your health  Even a small decrease in BMI can reduce the risk for many health problems  Your healthcare provider will help you set a weight-loss goal   Lifestyle changes  are the first step in treating obesity  These include making healthy food choices and getting regular physical activity  Your healthcare provider may suggest a weight-loss program that involves coaching, education, and therapy  Medicine  may help you lose weight when it is used with a healthy foods and physical activity  Surgery  can help you lose weight if you have obesity along with other health problems  Several types of weight-loss surgery are available  Ask your healthcare provider for more information  Tips for safe weight loss:   Set small, realistic goals  An example of a small goal is to walk for 20 minutes 5 days a week  Anther goal is to lose 5% of your body weight  Ask for support  Tell friends, family members, and coworkers about your goals  Ask someone to lose weight with you  You may also want to join a weight-loss support group  Identify foods or triggers that may cause you to overeat  Remove tempting high-calorie foods from your home and workplace  Place a bowl of fresh fruit on your kitchen counter  If stress causes you to eat, find other ways to cope with stress  A counselor or therapist may be able to help you  Track your daily calories and activity  Write down what you eat and drink  Also write down how many minutes of physical activity you do each day  Track your weekly weight  Weigh yourself in the morning, before you eat or drink anything but after you use the bathroom  Use the same scale, in the same place, and in similar clothing each time   Only weigh yourself 1 to 2 times each week, or as directed  You may become discouraged if you weigh yourself every day  Eating changes: You will need to eat 500 to 1,000 fewer calories each day than you currently eat to lose 1 to 2 pounds a week  The following changes will help you cut calories:  Eat smaller portions  Use small plates, no larger than 9 inches in diameter  Fill your plate half full of fruits and vegetables  Measure your food using measuring cups until you know what a serving size looks like  Eat 3 meals and 1 or 2 snacks each day  Plan your meals in advance  [place] and eat at home most of the time  Eat slowly  Do not skip meals  Skipping meals can lead to overeating later in the day  This can make it harder for you to lose weight  Talk with a dietitian to help you make a meal plan and schedule that is right for you  Eat fruits and vegetables at every meal   They are low in calories and high in fiber, which makes you feel full  Do not add butter, margarine, or cream sauce to vegetables  Use herbs to season steamed vegetables  Eat less fat and fewer fried foods  Eat more baked or grilled chicken and fish  These protein sources are lower in calories and fat than red meat  Limit fast food  Dress your salads with olive oil and vinegar instead of bottled dressing  Limit the amount of sugar you eat  Do not drink sugary beverages  Limit alcohol  Activity changes:  Physical activity is good for your body in many ways  It helps you burn calories and build strong muscles  It decreases stress and depression, and improves your mood  It can also help you sleep better  Talk to your healthcare provider before you begin an exercise program   Exercise for at least 30 minutes 5 days a week  Start slowly  Set aside time each day for physical activity that you enjoy and that is convenient for you   It is best to do both weight training and an activity that increases your heart rate, such as walking, bicycling, or swimming  Find ways to be more active  Do yard work and housecleaning  Walk up the stairs instead of using elevators  Spend your leisure time going to events that require walking, such as outdoor festivals or fairs  This extra physical activity can help you lose weight and keep it off  Follow up with your doctor as directed: You may need to meet with a dietitian  Write down your questions so you remember to ask them during your visits  © Copyright Sanam Plate 2022 Information is for End User's use only and may not be sold, redistributed or otherwise used for commercial purposes  The above information is an  only  It is not intended as medical advice for individual conditions or treatments  Talk to your doctor, nurse or pharmacist before following any medical regimen to see if it is safe and effective for you  Cholesterol and Your Health   AMBULATORY CARE:   Cholesterol  is a waxy, fat-like substance  Your body uses cholesterol to make hormones and new cells, and to protect nerves  Cholesterol is made by your body  It also comes from certain foods you eat, such as meat and dairy products  Your healthcare provider can help you set goals for your cholesterol levels  He or she can help you create a plan to meet your goals  Cholesterol level goals: Your cholesterol level goals depend on your risk for heart disease, your age, and your other health conditions  The following are general guidelines: Total cholesterol  includes low-density lipoprotein (LDL), high-density lipoprotein (HDL), and triglyceride levels  The total cholesterol level should be lower than 200 mg/dL and is best at about 150 mg/dL  LDL cholesterol  is called bad cholesterol  because it forms plaque in your arteries  As plaque builds up, your arteries become narrow, and less blood flows through  When plaque decreases blood flow to your heart, you may have chest pain   If plaque completely blocks an artery that brings blood to your heart, you may have a heart attack  Plaque can break off and form blood clots  Blood clots may block arteries in your brain and cause a stroke  The level should be less than 130 mg/dL and is best at about 100 mg/dL  HDL cholesterol  is called good cholesterol  because it helps remove LDL cholesterol from your arteries  It does this by attaching to LDL cholesterol and carrying it to your liver  Your liver breaks down LDL cholesterol so your body can get rid of it  High levels of HDL cholesterol can help prevent a heart attack and stroke  Low levels of HDL cholesterol can increase your risk for heart disease, heart attack, and stroke  The level should be 60 mg/dL or higher  Triglycerides  are a type of fat that store energy from foods you eat  High levels of triglycerides also cause plaque buildup  This can increase your risk for a heart attack or stroke  If your triglyceride level is high, your LDL cholesterol level may also be high  The level should be less than 150 mg/dL  Any of the following can increase your risk for high cholesterol:   Smoking cigarettes    Being overweight or obese, or not getting enough exercise    Drinking large amounts of alcohol    A medical condition such as hypertension (high blood pressure) or diabetes    Certain genes passed from your parents to you    Age older than 65 years    What you need to know about having your cholesterol levels checked: Adults 21to 39years of age should have their cholesterol levels checked every 4 to 6 years  Adults 45 years or older should have their cholesterol checked every 1 to 2 years  You may need your cholesterol checked more often, or at a younger age, if you have risk factors for heart disease  You may also need to have your cholesterol checked more often if you have other health conditions, such as diabetes  Blood tests are used to check cholesterol levels   Blood tests measure your levels of triglycerides, LDL cholesterol, and HDL cholesterol  How healthy fats affect your cholesterol levels:  Healthy fats, also called unsaturated fats, help lower LDL cholesterol and triglyceride levels  Healthy fats include the following:  Monounsaturated fats  are found in foods such as olive oil, canola oil, avocado, nuts, and olives  Polyunsaturated fats,  such as omega 3 fats, are found in fish, such as salmon, trout, and tuna  They can also be found in plant foods such as flaxseed, walnuts, and soybeans  How unhealthy fats affect your cholesterol levels:  Unhealthy fats increase LDL cholesterol and triglyceride levels  They are found in foods high in cholesterol, saturated fat, and trans fat:  Cholesterol  is found in eggs, dairy, and meat  Saturated fat  is found in butter, cheese, ice cream, whole milk, and coconut oil  Saturated fat is also found in meat, such as sausage, hot dogs, and bologna  Trans fat  is found in liquid oils and is used in fried and baked foods  Foods that contain trans fats include chips, crackers, muffins, sweet rolls, microwave popcorn, and cookies  Treatment  for high cholesterol will also decrease your risk of heart disease, heart attack, and stroke  Treatment may include any of the following:  Lifestyle changes  may include food, exercise, weight loss, and quitting smoking  You may also need to decrease the amount of alcohol you drink  Your healthcare provider will want you to start with lifestyle changes  Other treatment may be added if lifestyle changes are not enough  Your healthcare provider may recommend you work with a team to manage hyperlipidemia  The team may include medical experts such as a dietitian, an exercise or physical therapist, and a behavior therapist  Your family members may be included in helping you create lifestyle changes  Medicines  may be given to lower your LDL cholesterol, triglyceride levels, or total cholesterol level   You may need medicines to lower your cholesterol if any of the following is true:    You have a history of stroke, TIA, unstable angina, or a heart attack  Your LDL cholesterol level is 190 mg/dL or higher  You are age 36 to 76 years, have diabetes or heart disease risk factors, and your LDL cholesterol is 70 mg/dL or higher  Supplements  include fish oil, red yeast rice, and garlic  Fish oil may help lower your triglyceride and LDL cholesterol levels  It may also increase your HDL cholesterol level  Red yeast rice may help decrease your total cholesterol level and LDL cholesterol level  Garlic may help lower your total cholesterol level  Do not take any supplements without talking to your healthcare provider  Food changes you can make to lower your cholesterol levels:  A dietitian can help you create a healthy eating plan  He or she can show you how to read food labels and choose foods low in saturated fat, trans fats, and cholesterol  Decrease the total amount of fat you eat  Choose lean meats, fat-free or 1% fat milk, and low-fat dairy products, such as yogurt and cheese  Try to limit or avoid red meats  Limit or do not eat fried foods or baked goods, such as cookies  Replace unhealthy fats with healthy fats  Cook foods in olive oil or canola oil  Choose soft margarines that are low in saturated fat and trans fat  Seeds, nuts, and avocados are other examples of healthy fats  Eat foods with omega-3 fats  Examples include salmon, tuna, mackerel, walnuts, and flaxseed  Eat fish 2 times per week  Pregnant women should not eat fish that have high levels of mercury, such as shark, swordfish, and melodie mackerel  Increase the amount of high-fiber foods you eat  High-fiber foods can help lower your LDL cholesterol  Aim to get between 20 and 30 grams of fiber each day  Fruits and vegetables are high in fiber  Eat at least 5 servings each day   Other high-fiber foods are whole-grain or whole-wheat breads, pastas, or cereals, and brown rice  Eat 3 ounces of whole-grain foods each day  Increase fiber slowly  You may have abdominal discomfort, bloating, and gas if you add fiber to your diet too quickly  Eat healthy protein foods  Examples include low-fat dairy products, skinless chicken and turkey, fish, and nuts  Limit foods and drinks that are high in sugar  Your dietitian or healthcare provider can help you create daily limits for high-sugar foods and drinks  The limit may be lower if you have diabetes or another health condition  Limits can also help you lose weight if needed  Lifestyle changes you can make to lower your cholesterol levels:   Maintain a healthy weight  Ask your healthcare provider what a healthy weight is for you  Ask him or her to help you create a weight loss plan if needed  Weight loss can decrease your total cholesterol and triglyceride levels  Weight loss may also help keep your blood pressure at a healthy level  Be physically active throughout the day  Physical activity, such as exercise, can help lower your total cholesterol level and maintain a healthy weight  Physical activity can also help increase your HDL cholesterol level  Work with your healthcare provider to create an program that is right for you  Get at least 30 to 40 minutes of moderate physical activity most days of the week  Examples of exercise include brisk walking, swimming, or biking  Also include strength training at least 2 times each week  Your healthcare providers can help you create a physical activity plan  Do not smoke  Nicotine and other chemicals in cigarettes and cigars can raise your cholesterol levels  Ask your healthcare provider for information if you currently smoke and need help to quit  E-cigarettes or smokeless tobacco still contain nicotine  Talk to your healthcare provider before you use these products  Limit or do not drink alcohol  Alcohol can increase your triglyceride levels   Ask your healthcare provider before you drink alcohol  Ask how much is okay for you to drink in 24 hours or 1 week  Follow up with your doctor as directed:  Write down your questions so you remember to ask them during your visits  © Copyright Sandra Howard 2022 Information is for End User's use only and may not be sold, redistributed or otherwise used for commercial purposes  The above information is an  only  It is not intended as medical advice for individual conditions or treatments  Talk to your doctor, nurse or pharmacist before following any medical regimen to see if it is safe and effective for you

## 2023-05-24 DIAGNOSIS — E03.9 HYPOTHYROIDISM, UNSPECIFIED TYPE: ICD-10-CM

## 2023-05-25 RX ORDER — LEVOTHYROXINE SODIUM 0.05 MG/1
TABLET ORAL
Qty: 90 TABLET | Refills: 1 | OUTPATIENT
Start: 2023-05-25

## 2023-06-02 ENCOUNTER — TELEPHONE (OUTPATIENT)
Dept: FAMILY MEDICINE CLINIC | Facility: CLINIC | Age: 33
End: 2023-06-02

## 2023-06-02 NOTE — TELEPHONE ENCOUNTER
Patient can call dermatology on her own for an appointment    If she is requesting a formal referral,  She will need to be evaluated in the office first

## 2023-06-02 NOTE — TELEPHONE ENCOUNTER
Patient called she would like an order put in to see Dermatology for suspicious mole that she would like to have checked, please advise

## 2023-06-02 NOTE — TELEPHONE ENCOUNTER
Spoke with pt   Pt states she believes she needs a formal referral  Scheduled for an appt 6/6 for evaluation and referral

## 2023-06-05 NOTE — PROGRESS NOTES
Assessment/Plan:  Problem List Items Addressed This Visit        Endocrine    Hypothyroid     Hypothyroidism  S/p recent Increase Synthroid 50 mcg daily  Other    Class 1 obesity without serious comorbidity with body mass index (BMI) of 31 0 to 31 9 in adult     Worsening  Recommend lifestyle modifications  Patient previously declined weight management consult  To consider GLP1-agonist?          Other Visit Diagnoses     Neoplasm of uncertain behavior of skin    -  Primary    Relevant Orders    Ambulatory Referral to Dermatology    Papule of skin        Relevant Orders    Ambulatory Referral to Dermatology    Family history of melanoma        Relevant Orders    Ambulatory Referral to Dermatology           Return in about 5 months (around 11/2/2023) for (around 11/2/2023) for 6mo - Hypothyroid, Migraines, Myalgias, Obesity, Labs  Future Appointments   Date Time Provider Sony De Santiago   6/21/2023  4:00 PM Marquis Duckworth MD [facility]        Subjective:     Radha Stuart is a 28 y o  female who presents today for a follow-up on her acute medical conditions  HPI:  Chief Complaint   Patient presents with   • Nevus     Pt notes a mole on the R chest/breast area that has been present for the last several months  Pt states she believed it was a pimple at first, but it grew bigger and developed irregular borders  There is also a new area adjacent to the original lesion that pt would like to be checked as well  Sister was just dx with melanoma six months ago  -- Above per clinical staff and reviewed  --    HPI      Today:      Nevus x 2 on Right Chest - Superior lesion Symptoms x 6 months, unsure if size is increasing or shape is changing  Inferior lesion Symptoms x 1 week  Denies itching, pain, or D/C  Desires Derm referral as sister diagnosed c melanoma at 30yo  She has not scheduled Derm appt yet as referral is needed  Wears sunblock        The following portions of the "patient's history were reviewed and updated as appropriate: allergies, current medications, past family history, past medical history, past social history, past surgical history and problem list       Review of Systems   Constitutional: Negative for appetite change, chills, diaphoresis, fatigue and fever  Respiratory: Negative for chest tightness and shortness of breath  Cardiovascular: Negative for chest pain  Gastrointestinal: Negative for abdominal pain, blood in stool, diarrhea, nausea and vomiting  Genitourinary: Negative for dysuria  Current Outpatient Medications   Medication Sig Dispense Refill   • cholecalciferol (VITAMIN D3) 1,000 units tablet Take 1,000 Units by mouth daily     • levothyroxine (Synthroid) 50 mcg tablet Take 1 tablet (50 mcg total) by mouth daily 30 tablet 1   • melatonin 3 mg Take 3 mg by mouth daily at bedtime 1-2 tabs by mouth QHS  • Multiple Vitamin (MULTIVITAMINS PO) Take 1 tablet by mouth daily      • Norethin-Eth Estrad-Fe Biphas 1 MG-10 MCG / 10 MCG TABS Take 1 tablet by mouth daily Rx per Gyn     • prochlorperazine (COMPAZINE) 10 mg tablet Take 1 tablet (10 mg total) by mouth every 8 (eight) hours as needed for nausea or vomiting (Migraine) 30 tablet 0   • rizatriptan (MAXALT-MLT) 10 mg disintegrating tablet TAKE 1 TAB BY MOUTH ONCE AS NEEDED FOR MIGRAINE (MAX 30 MG/24 HRS ) MAY REPEAT IN 2 HOURS IF NEEDED 9 tablet 0     No current facility-administered medications for this visit  Objective:  /82   Pulse 82   Temp (!) 97 4 °F (36 3 °C)   Resp 18   Ht 5' 7\" (1 702 m)   Wt 92 5 kg (204 lb)   SpO2 99%   BMI 31 95 kg/m²    Wt Readings from Last 3 Encounters:   06/06/23 92 5 kg (204 lb)   05/02/23 91 kg (200 lb 9 6 oz)   04/26/22 90 kg (198 lb 6 4 oz)      BP Readings from Last 3 Encounters:   06/06/23 126/82   05/02/23 124/76   04/26/22 120/86          Physical Exam  Vitals and nursing note reviewed     Constitutional:       Appearance: Normal " "appearance  She is well-developed  She is obese  HENT:      Head: Normocephalic and atraumatic  Eyes:      Conjunctiva/sclera: Conjunctivae normal    Neck:      Thyroid: No thyromegaly  Cardiovascular:      Rate and Rhythm: Normal rate and regular rhythm  Pulses: Normal pulses  Heart sounds: Normal heart sounds  Pulmonary:      Effort: Pulmonary effort is normal       Breath sounds: Normal breath sounds  Musculoskeletal:         General: No swelling or tenderness  Cervical back: Neck supple  Right lower leg: No edema  Left lower leg: No edema  Lymphadenopathy:      Cervical: No cervical adenopathy  Skin:     Comments: Right anterior medial superior breast c pinpoint cluster of 3 pink papules, right anterior medial inferior breast c pinpoint umbilicated pink papule  Neurological:      General: No focal deficit present  Mental Status: She is alert and oriented to person, place, and time  Lab Results:      Lab Results   Component Value Date    ALT 19 03/04/2023    AST 18 03/04/2023    BUN 13 03/04/2023     03/04/2023    CO2 26 03/04/2023    CREATININE 0 91 03/04/2023    GLUF 92 03/04/2023    HCT 43 8 03/05/2022    HDL 67 03/04/2023    HGB 14 2 03/05/2022    HGBA1C 4 8 07/22/2019    K 4 1 03/04/2023    LDLDIRECT 99 03/04/2023     07/01/2014     03/05/2022    TRIG 109 03/04/2023    WBC 7 35 03/05/2022     Lab Results   Component Value Date    URICACID 4 0 10/22/2020     Invalid input(s): \"BASENAME\" Vitamin D    No results found       POCT Labs                       "

## 2023-06-06 ENCOUNTER — OFFICE VISIT (OUTPATIENT)
Dept: FAMILY MEDICINE CLINIC | Facility: CLINIC | Age: 33
End: 2023-06-06
Payer: COMMERCIAL

## 2023-06-06 VITALS
OXYGEN SATURATION: 99 % | HEART RATE: 82 BPM | HEIGHT: 67 IN | BODY MASS INDEX: 32.02 KG/M2 | WEIGHT: 204 LBS | DIASTOLIC BLOOD PRESSURE: 82 MMHG | TEMPERATURE: 97.4 F | SYSTOLIC BLOOD PRESSURE: 126 MMHG | RESPIRATION RATE: 18 BRPM

## 2023-06-06 DIAGNOSIS — E66.9 CLASS 1 OBESITY WITHOUT SERIOUS COMORBIDITY WITH BODY MASS INDEX (BMI) OF 31.0 TO 31.9 IN ADULT, UNSPECIFIED OBESITY TYPE: ICD-10-CM

## 2023-06-06 DIAGNOSIS — Z80.8 FAMILY HISTORY OF MELANOMA: ICD-10-CM

## 2023-06-06 DIAGNOSIS — R23.8 PAPULE OF SKIN: ICD-10-CM

## 2023-06-06 DIAGNOSIS — D48.5 NEOPLASM OF UNCERTAIN BEHAVIOR OF SKIN: Primary | ICD-10-CM

## 2023-06-06 DIAGNOSIS — E03.9 HYPOTHYROIDISM, UNSPECIFIED TYPE: ICD-10-CM

## 2023-06-06 PROCEDURE — 99214 OFFICE O/P EST MOD 30 MIN: CPT | Performed by: FAMILY MEDICINE

## 2023-06-06 NOTE — ASSESSMENT & PLAN NOTE
Worsening  Recommend lifestyle modifications  Patient previously declined weight management consult    To consider GLP1-agonist?

## 2023-06-20 DIAGNOSIS — E03.9 HYPOTHYROIDISM, UNSPECIFIED TYPE: ICD-10-CM

## 2023-06-20 RX ORDER — LEVOTHYROXINE SODIUM 0.05 MG/1
TABLET ORAL
Qty: 90 TABLET | Refills: 1 | Status: SHIPPED | OUTPATIENT
Start: 2023-06-20

## 2023-06-21 ENCOUNTER — OFFICE VISIT (OUTPATIENT)
Dept: OBGYN CLINIC | Facility: CLINIC | Age: 33
End: 2023-06-21
Payer: COMMERCIAL

## 2023-06-21 VITALS
SYSTOLIC BLOOD PRESSURE: 110 MMHG | DIASTOLIC BLOOD PRESSURE: 80 MMHG | HEIGHT: 67 IN | WEIGHT: 203 LBS | BODY MASS INDEX: 31.86 KG/M2

## 2023-06-21 DIAGNOSIS — Z12.4 CERVICAL CANCER SCREENING: ICD-10-CM

## 2023-06-21 DIAGNOSIS — Z01.419 WELL WOMAN EXAM WITH ROUTINE GYNECOLOGICAL EXAM: ICD-10-CM

## 2023-06-21 DIAGNOSIS — Z11.51 SCREENING FOR HUMAN PAPILLOMAVIRUS (HPV): ICD-10-CM

## 2023-06-21 DIAGNOSIS — Z11.51 SCREENING FOR HPV (HUMAN PAPILLOMAVIRUS): Primary | ICD-10-CM

## 2023-06-21 DIAGNOSIS — Z12.4 SCREENING FOR MALIGNANT NEOPLASM OF CERVIX: ICD-10-CM

## 2023-06-21 PROCEDURE — G0145 SCR C/V CYTO,THINLAYER,RESCR: HCPCS | Performed by: OBSTETRICS & GYNECOLOGY

## 2023-06-21 PROCEDURE — 99385 PREV VISIT NEW AGE 18-39: CPT | Performed by: OBSTETRICS & GYNECOLOGY

## 2023-06-21 NOTE — PROGRESS NOTES
ASSESSMENT & PLAN: Damián Cruz is a 28 y o  Ken Davis with normal gynecologic exam     1   Routine well woman exam done today  2    Pap and HPV:Pap with HPV was done today  Current ASCCP Guidelines reviewed  Last Pap  [unfilled] :  no abnormalities  3   The patient declined STD testing  4  The patient is sexually active  She agreeed to contraception and options have been discussed  Considering TTC this year  Discussed stopping OCP when ready to TTC and starting PNV with folic acid  Will refer to M for preconception counseling due to possible connective tissue d/o, hypothyroidism, hx of back pain and surgery  5  The following were reviewed in today's visit: breast self exam and family planning choices  6  Patient to return to office in 12 months for WWE  All questions have been answered to her satisfaction  CC:  Annual Gynecologic Examination    HPI: Damián Cruz is a 28 y o  Ken Davis who presents for annual gynecologic examination  She has the following concerns:  Currently taking Lo Loestin continuously and does not have a menstrual cycle  Interested to [address] [place] [address] this year  Would like to know how to prepare for conception  Hx of back pain and back surgery (no hardware)  Hypothyroid on levothyroxine  Sister with melanoma  Possible connective tissue d/o- reports joint issues, hypermobility     Health Maintenance:    She wears her seatbelt routinely  She does perform regular monthly self breast exams  She feels safe at home        Past Medical History:   Diagnosis Date   • Anemia 2005   • Arthritis 2005   • Class 1 obesity without serious comorbidity with body mass index (BMI) of 30 0 to 30 9 in adult    • Class 1 obesity without serious comorbidity with body mass index (BMI) of 31 0 to 31 9 in adult    • Dysmenorrhea    • Hyperlipidemia    • Hypothyroid    • Iron deficiency anemia    • Lumbar herniated disc     s/p LAINEY   • Migraines    • Ovarian cyst     On OCP per Gyn   • Overweight Past Surgical History:   Procedure Laterality Date   • SPINE SURGERY  2019    On record at [facility]   • WISDOM TOOTH EXTRACTION         Past OB/Gyn History:  Period Cycle (Days): 28  Period Pattern: RegularNo LMP recorded (lmp unknown)  (Menstrual status: Birth Control)  Menstrual History:  OB History        0    Para   0    Term   0       0    AB   0    Living   0       SAB   0    IAB   0    Ectopic   0    Multiple   0    Live Births   0           Obstetric Comments   Menarche 12   On lo Loestrin- no menstrual bleeding             No LMP recorded (lmp unknown)  (Menstrual status: Birth Control)  Period Cycle (Days): 28  Period Pattern: Regular    History of sexually transmitted infection No  Patient is currently sexually active  heterosexual Birth control: combination OCPs  Last Pap  [unfilled] :  no abnormalities      Family History   Problem Relation Age of Onset   • No Known Problems Mother    • Hypertension Father    • Scoliosis Sister    • Endometriosis Sister    • Ovarian cysts Sister    • Melanoma Sister 35   • Depression Brother         Anxiety related   • Anxiety disorder Brother    • Breast cancer Maternal Grandmother    • Rheum arthritis Maternal Grandmother         Hands effected   • Arthritis Maternal Grandmother         General   • Cancer Maternal Grandmother         Breast   • Heart disease Maternal Grandfather    • Diabetes Maternal Grandfather    • Stroke Maternal Grandfather         Stroke and heart attack   • Lung cancer Paternal Grandmother        Social History:  Social History     Socioeconomic History   • Marital status: /Civil Union     Spouse name: Not on file   • Number of children: 0   • Years of education: Not on file   • Highest education level: Not on file   Occupational History   • Occupation:      Comment: [address] in [place]   Tobacco Use   • Smoking status: Never   • Smokeless tobacco: Never   Vaping Use   • Vaping Use: Never used Substance and Sexual Activity   • Alcohol use: Yes     Comment: occasion   • Drug use: Never   • Sexual activity: Yes     Partners: Male     Birth control/protection: OCP     Comment: Generic loestrin fe   Other Topics Concern   • Not on file   Social History Narrative        Lives with     No children         Social Determinants of Health     Financial Resource Strain: Not on file   Food Insecurity: Not on file   Transportation Needs: Not on file   Physical Activity: Not on file   Stress: Not on file   Social Connections: Not on file   Intimate Partner Violence: Not on file   Housing Stability: Not on file     Presently lives with partner  Patient is   Patient is currently employed [employer]   Allergies   Allergen Reactions   • Pollen Extract Sneezing     Itchy eyes       Current Outpatient Medications:   •  levothyroxine 50 mcg tablet, TAKE 1 TABLET BY MOUTH EVERY DAY, Disp: 90 tablet, Rfl: 1  •  melatonin 3 mg, Take 3 mg by mouth daily at bedtime 1-2 tabs by mouth QHS  , Disp: , Rfl:   •  Multiple Vitamin (MULTIVITAMINS PO), Take 1 tablet by mouth daily , Disp: , Rfl:   •  Norethin-Eth Estrad-Fe Biphas 1 MG-10 MCG / 10 MCG TABS, Take 1 tablet by mouth daily Rx per Gyn, Disp: , Rfl:   •  prochlorperazine (COMPAZINE) 10 mg tablet, Take 1 tablet (10 mg total) by mouth every 8 (eight) hours as needed for nausea or vomiting (Migraine), Disp: 30 tablet, Rfl: 0  •  rizatriptan (MAXALT-MLT) 10 mg disintegrating tablet, TAKE 1 TAB BY MOUTH ONCE AS NEEDED FOR MIGRAINE (MAX 30 MG/24 HRS ) MAY REPEAT IN 2 HOURS IF NEEDED, Disp: 9 tablet, Rfl: 0  •  cholecalciferol (VITAMIN D3) 1,000 units tablet, Take 1,000 Units by mouth daily (Patient not taking: Reported on 6/21/2023), Disp: , Rfl:     Review of Systems:  Review of Systems   Constitutional: Negative for activity change, chills, fever and unexpected weight change  HENT: Negative for congestion, ear pain, hearing loss and sore throat  "  Respiratory: Negative for cough, chest tightness and shortness of breath  Cardiovascular: Negative for chest pain and leg swelling  Gastrointestinal: Negative for abdominal pain, constipation, diarrhea, nausea and vomiting  Genitourinary: Negative for difficulty urinating, dysuria, frequency, menstrual problem, pelvic pain, vaginal discharge and vaginal pain  Skin: Negative for color change and rash  Neurological: Negative for dizziness, numbness and headaches  Psychiatric/Behavioral: Negative for agitation and confusion  Physical Exam:  /80 (BP Location: Left arm, Patient Position: Sitting, Cuff Size: Standard)   Ht 5' 7\" (1 702 m)   Wt 92 1 kg (203 lb)   LMP  (LMP Unknown)   BMI 31 79 kg/m²    Physical Exam  Constitutional:       General: She is not in acute distress  Appearance: Normal appearance  Genitourinary:      Vulva, bladder, rectum and urethral meatus normal       No lesions in the vagina  Right Labia: No rash, tenderness or lesions  Left Labia: No tenderness, lesions or rash  No labial fusion noted  No vaginal discharge or tenderness  No vaginal prolapse present  No vaginal atrophy present  Right Adnexa: not tender, not full and no mass present  Left Adnexa: not tender, not full and no mass present  No cervical motion tenderness or friability  Uterus is not enlarged or prolapsed  Breasts:     Breasts are soft  Right: No inverted nipple, mass, nipple discharge or skin change  Left: No inverted nipple, mass, nipple discharge or skin change  HENT:      Head: Normocephalic and atraumatic  Nose: Nose normal    Eyes:      Conjunctiva/sclera: Conjunctivae normal       Pupils: Pupils are equal, round, and reactive to light  Cardiovascular:      Rate and Rhythm: Normal rate and regular rhythm  Pulses: Normal pulses  Heart sounds: Normal heart sounds     Pulmonary:      Effort: Pulmonary effort is " normal  No respiratory distress  Breath sounds: Normal breath sounds  No wheezing  Abdominal:      General: Abdomen is flat  There is no distension  Palpations: Abdomen is soft  Tenderness: There is no abdominal tenderness  There is no guarding  Musculoskeletal:         General: Normal range of motion  Cervical back: Normal range of motion and neck supple  Neurological:      General: No focal deficit present  Mental Status: She is alert and oriented to person, place, and time  Mental status is at baseline  Skin:     General: Skin is warm and dry  Psychiatric:         Mood and Affect: Mood normal          Behavior: Behavior normal          Thought Content: Thought content normal          Judgment: Judgment normal    Vitals and nursing note reviewed

## 2023-06-23 ENCOUNTER — LAB (OUTPATIENT)
Dept: LAB | Facility: CLINIC | Age: 33
End: 2023-06-23
Payer: COMMERCIAL

## 2023-06-23 DIAGNOSIS — E03.9 HYPOTHYROIDISM, UNSPECIFIED TYPE: Primary | ICD-10-CM

## 2023-06-23 LAB — TSH SERPL DL<=0.05 MIU/L-ACNC: 3.14 UIU/ML (ref 0.45–4.5)

## 2023-06-23 PROCEDURE — 84443 ASSAY THYROID STIM HORMONE: CPT

## 2023-06-23 PROCEDURE — 36415 COLL VENOUS BLD VENIPUNCTURE: CPT

## 2023-06-24 NOTE — RESULT ENCOUNTER NOTE
Normal thyroid labs  Continue current dose of thyroid medication  Please schedule:    Return in about 5 months (around 11/2/2023) for (around 11/2/2023) for 6mo - Hypothyroid, Migraines, Myalgias, Obesity, Labs      Message sent to patient via [facility] patient portal

## 2023-06-28 LAB
LAB AP GYN PRIMARY INTERPRETATION: NORMAL
Lab: NORMAL

## 2023-06-30 LAB
HPV HR 12 DNA CVX QL NAA+PROBE: NEGATIVE
HPV16 DNA CVX QL NAA+PROBE: NEGATIVE
HPV18 DNA CVX QL NAA+PROBE: NEGATIVE

## 2023-06-30 PROCEDURE — 87624 HPV HI-RISK TYP POOLED RSLT: CPT | Performed by: OBSTETRICS & GYNECOLOGY

## 2023-08-14 ENCOUNTER — TELEPHONE (OUTPATIENT)
Facility: HOSPITAL | Age: 33
End: 2023-08-14

## 2023-08-14 NOTE — TELEPHONE ENCOUNTER
LVM for patient to schedule a preconception consultation. [facility] phone number provided for scheduling.

## 2023-10-18 PROBLEM — Z31.69 ENCOUNTER FOR PRECONCEPTION CONSULTATION: Status: ACTIVE | Noted: 2023-10-18

## 2023-10-19 ENCOUNTER — TELEMEDICINE (OUTPATIENT)
Dept: PERINATAL CARE | Facility: CLINIC | Age: 33
End: 2023-10-19

## 2023-10-19 DIAGNOSIS — E03.9 HYPOTHYROIDISM, UNSPECIFIED TYPE: ICD-10-CM

## 2023-10-19 DIAGNOSIS — M51.16 LUMBAR DISC DISEASE WITH RADICULOPATHY: ICD-10-CM

## 2023-10-19 DIAGNOSIS — Z31.69 ENCOUNTER FOR PRECONCEPTION CONSULTATION: ICD-10-CM

## 2023-10-19 DIAGNOSIS — M24.9 HYPERMOBILITY OF JOINT: Primary | ICD-10-CM

## 2023-10-19 DIAGNOSIS — G43.009 MIGRAINE WITHOUT AURA AND WITHOUT STATUS MIGRAINOSUS, NOT INTRACTABLE: ICD-10-CM

## 2023-10-19 DIAGNOSIS — E66.9 CLASS 1 OBESITY WITHOUT SERIOUS COMORBIDITY WITH BODY MASS INDEX (BMI) OF 31.0 TO 31.9 IN ADULT, UNSPECIFIED OBESITY TYPE: ICD-10-CM

## 2023-10-19 DIAGNOSIS — Z01.419 WELL WOMAN EXAM WITH ROUTINE GYNECOLOGICAL EXAM: ICD-10-CM

## 2023-10-19 NOTE — ASSESSMENT & PLAN NOTE
We discussed her history of migraines. She reports infrequent migraines though when she does get one, it can last for over a day. She reports taking Compazine as well as Maxalt. As migraine may be modulated by fluctuations in estrogen levels, most women do report improvement over the course of pregnancy. Others report no change while others report worsening. Regarding her current medications, we discussed Compazine may be safely taken in pregnancy for migraines. Regarding Maxalt, we discussed triptans may be used sparingly in pregnancy if needed as there are theoretical concerns about uterine vasoconstriction and impaired placental perfusion. They do not appear teratogenic. Therefore, when pregnant, she should favor Compazine over Maxalt for abortive therapy. Her migraines are not frequent enough to require prophylactic therapy at this point. She was counseled there is no need to alter her migraine medication regimen in advance of trying to conceive, particularly as she requires these medications infrequently.

## 2023-10-19 NOTE — LETTER
October 19, 2023     Luis Antonio Rivera, [address] [facility]  [address]  [address] [facility]    Patient: Fernando [place]   YOB: 1990   Date of Visit: 10/19/2023       Dear Dr. Marysol Lange:    Thank you for referring Uday Palomo to me for evaluation. Below are my notes for this consultation. If you have questions, please do not hesitate to call me. I look forward to following your patient along with you. Sincerely,        Alpa Rivero MD        CC: No Recipients    Alpa Rivero MD  10/19/2023  9:55 AM  Sign when Signing Visit  PRECONCEPTION CONSULTATION: MATERNAL-FETAL MEDICINE     Virtual Regular Visit    Verification of patient location: [place] is located in the following state in which I hold an active license [place]. The patient was identified by name and date of birth. She was informed that this is a telemedicine visit and that the visit is being conducted through the [employer]. She agrees to proceed. My office door was closed. No one else was in the room. She acknowledged consent and understanding of privacy and security of the video platform. The patient has agreed to participate and understands they can discontinue the visit at any time. Patient is aware this is a billable service. Assessment and Plan: Ms. Nat Coates is a 28 y.o. [facility] here for preconception counseling. By issue:    Problem List Items Addressed This Visit       Lumbar disc disease with radiculopathy     Josh Cason has chronic lower back pain. She has herniated lumbar/sacral discs and facet arthropathy. She has been seen by neurology and orthopedics for this issue. Treatment has consisted of injections, nerve blocks, cauterization and supportive measures such as exercises and physical therapy. She does not take pain medication. We reviewed pregnancy can exacerbate back pain given the effect of the gravid uterus on her center of gravity and core strength and stability. Supportive measures will be emphasized and we will work with her throughout her pregnancy to control her pain. She was concerned about vaginal delivery given her lower back pain as well as issues with hip malalignment and hypermobility. We discussed these are not strict indications for  delivery but any woman has the option for primary  delivery. Mode of delivery can be further discussed in her future pregnancy. In her future pregnancy, consultation with anesthesiology is advised to discuss risks of neuraxial anesthesia in the setting of her lower back pain and multiple procedures. Class 1 obesity without serious comorbidity with body mass index (BMI) of 31.0 to 31.9 in adult     Pregnant women with BMI>30 should aim to limit weight gain of 11-20 pounds ideally via healthy diet and regular exercise, as elevated maternal BMI confers increased risks of preeclampsia, GDM, cardiac dysfunction, sleep apnea,  delivery, and VTE, and fetal risks include miscarriage, fetal anomalies (along with reduced detection), and stillbirth, macrosomia and impaired growth. Weight loss via diet and exercise was recommended prior to conception in order to reduce her risk of the aforementioned complications and to optimize pregnancy outcomes. Migraines     We discussed her history of migraines. She reports infrequent migraines though when she does get one, it can last for over a day. She reports taking Compazine as well as Maxalt. As migraine may be modulated by fluctuations in estrogen levels, most women do report improvement over the course of pregnancy. Others report no change while others report worsening. Regarding her current medications, we discussed Compazine may be safely taken in pregnancy for migraines.   Regarding Maxalt, we discussed triptans may be used sparingly in pregnancy if needed as there are theoretical concerns about uterine vasoconstriction and impaired placental perfusion. They do not appear teratogenic. Therefore, when pregnant, she should favor Compazine over Maxalt for abortive therapy. Her migraines are not frequent enough to require prophylactic therapy at this point. She was counseled there is no need to alter her migraine medication regimen in advance of trying to conceive, particularly as she requires these medications infrequently. Hypothyroid     Jyoti Cho has hypothyroidism managed on 50 mcg levothyroxine daily. Last TSH was 3.1. We reviewed in pregnancy that levothyroxine often needs to be increased to meet the increased demand for thyroid hormone. As long as her hypothyroidism is well controlled, she should not have any significant increased risk of adverse pregnancy outcome. Poorly controlled maternal hypothyroidism does place  the pregnancy at risk for preeclampsia, placental abruption, nonreassuring fetal heart rate,  birth, low birthweight,  morbidity and mortality, neuropsychological and cognitive impairment, and postpartum hemorrhage. Hypermobility of joint - Primary     Jyoti Cho reports a history of chronic joint pain and multiple joint injuries. She reports she has hypermobility and concern was raised by orthopedics for an underlying connective tissue disorder. She is uncertain whether she has complications with wound healing. She states her dad and sister also have hypermobility. No one in her family has a proven/genetic diagnosis of a connective tissue disorder. There is no history of sudden cardiac death. She was seen by a rheumatologist in  who attributed her symptoms to benign joint hypermobility. Work-up for inflammatory process or autoimmune condition was negative. Jyoti Cho has never been evaluated by a medical geneticist or connective tissue disorder specialist.    We discussed pregnancy leads to increased joint laxity and therefore may worsen her symptoms of joint pain.   Some connective tissue disorders can have great implications for pregnancy, particularly vascular Ro-Danlos syndrome, but this diagnosis is less likely given her lack of predominant features. If she has a connective tissue disorder, she may be at increased risk for pregnancy complications such as  labor and delivery. Referral to a medical geneticist was offered and she accepted. An echo was also ordered today to assess her cardiac function and aortic root, especially as she does have a history of palpitations and a heart murmur. Encounter for preconception consultation     I recommend that she be up-to-date on all preventive health, including pap smears, mammography, and colonoscopy (if indicated). Vaccines should be up-to-date as well, including annual influenza vaccination. COVID-19 vaccination is recommended, and mRNA vaccines (Lethaniel Evens) are favored in women of reproductive age. I recommend her immunity to vaccine-preventable illnesses including Varicella, Measles/Mumps/Rubella, and Hepatitis B be assessed by checking antibody titers (IgG), and that she receive boosters for any waning immunity. Referring physician:   Apryl Fine Md  [address] [facility]  [address]  [facility]),  [address] [place] [address]    Reason for consultation: preconception consultation    Dear Dr. Nesbitt Bachelor,  Thank you for referring patient Beulah Rousseau for preconception Maternal-Fetal Medicine consultation regarding joint hypermobility, back pain, hypothyroidism, migraine, and obesity. As you know, Ms. Chon Ho is a 28 y.o. Sundra Mary. She is currently on OCPs but is considering trying to conceive within the next year.  Her history is as follows:    Past Medical History:   Diagnosis Date   • Anemia    • Arthritis    • Class 1 obesity without serious comorbidity with body mass index (BMI) of 30.0 to 30.9 in adult    • Class 1 obesity without serious comorbidity with body mass index (BMI) of 31.0 to 31.9 in adult    • Dysmenorrhea    • Hyperlipidemia • Hypothyroid    • Iron deficiency anemia    • Lumbar herniated disc     s/p LAINEY   • Migraines    • Ovarian cyst     On OCP per Gyn   • Overweight        Past Surgical History:   Procedure Laterality Date   • SPINE SURGERY  2019    On record at [facility]   • WISDOM TOOTH EXTRACTION         OB History    Para Term  AB Living   0 0 0 0 0 0   SAB IAB Ectopic Multiple Live Births   0 0 0 0 0   Obstetric Comments   Menarche 15    On lo Loestrin- no menstrual bleeding        Gynecologic history: No LMP recorded. (Menstrual status: Birth Control). None. Has family history of endometriosis but has never been diagnosed herself. She had ovarian cysts as a teenager but no diagnosis of PCOS.     Social History     Tobacco Use   • Smoking status: Never   • Smokeless tobacco: Never   Vaping Use   • Vaping Use: Never used   Substance Use Topics   • Alcohol use: Yes     Comment: occasion   • Drug use: Never       Family History   Problem Relation Age of Onset   • No Known Problems Mother    • Hypertension Father    • Scoliosis Sister    • Endometriosis Sister    • Ovarian cysts Sister    • Melanoma Sister 35   • Depression Brother         Anxiety related   • Anxiety disorder Brother    • Breast cancer Maternal Grandmother    • Rheum arthritis Maternal Grandmother         Hands effected   • Arthritis Maternal Grandmother         General   • Cancer Maternal Grandmother         Breast   • Heart disease Maternal Grandfather    • Diabetes Maternal Grandfather    • Stroke Maternal Grandfather         Stroke and heart attack   • Lung cancer Paternal Grandmother      Family history includes diabetes in her grandfather and preeclampsia in her mother but is negative for birth defects, blood clot in legs or lungs, early-onset breast ovarian or colon cancer; Down syndrome or other chromosome problem, genetic condition or carrier state for cystic fibrosis, sickle cell, thalassemia, Daniel-Sachs, or spinal muscular atrophy; hemophilia or von Willebrand's disease. Current Outpatient Medications:   •  levothyroxine 50 mcg tablet, TAKE 1 TABLET BY MOUTH EVERY DAY, Disp: 90 tablet, Rfl: 1  •  melatonin 3 mg, Take 3 mg by mouth daily at bedtime 1-2 tabs by mouth QHS. , Disp: , Rfl:   •  Multiple Vitamin (MULTIVITAMINS PO), Take 1 tablet by mouth daily , Disp: , Rfl:   •  Norethin-Eth Estrad-Fe Biphas 1 MG-10 MCG / 10 MCG TABS, Take 1 tablet by mouth daily Rx per Gyn, Disp: , Rfl:   •  prochlorperazine (COMPAZINE) 10 mg tablet, Take 1 tablet (10 mg total) by mouth every 8 (eight) hours as needed for nausea or vomiting (Migraine), Disp: 30 tablet, Rfl: 0  •  rizatriptan (MAXALT-MLT) 10 mg disintegrating tablet, TAKE 1 TAB BY MOUTH ONCE AS NEEDED FOR MIGRAINE (MAX 30 MG/24 HRS.) MAY REPEAT IN 2 HOURS IF NEEDED, Disp: 9 tablet, Rfl: 0    Allergies   Allergen Reactions   • Pollen Extract Sneezing     Itchy eyes     Occupation: works for "[employer]. Spouse/Partner Name: MaguireiRule; Age 28; occupation: works at [employer]. Vitals: not currently breastfeeding. Exam:  General appearance: alert, well appearing, and in no distress. The remainder of her physical examination was deferred as she was here today for consultation and discussion.    -------------------------  At the conclusion of today's encounter, all questions were answered to her satisfaction. Thank you very much for this kind referral and please do not hesitate to contact me with any further questions or concerns. Sincerely,    Graham Carpio MD  Attending Physician, [facility]    I spent 32 minutes directly with the patient during this visit    VIRTUAL VISIT [address] [facility] [place] verbally agrees to participate in [employer].   Patient is aware that [employer] could be limited without vital signs or the ability to perform a full hands-on physical exam. Karen Banuelos understands she or the provider may request at any time to terminate the video visit and request the patient to seek care or treatment in person.

## 2023-10-19 NOTE — ASSESSMENT & PLAN NOTE
Allyson Patel has chronic lower back pain. She has herniated lumbar/sacral discs and facet arthropathy. She has been seen by neurology and orthopedics for this issue. Treatment has consisted of injections, nerve blocks, cauterization and supportive measures such as exercises and physical therapy. She does not take pain medication. We reviewed pregnancy can exacerbate back pain given the effect of the gravid uterus on her center of gravity and core strength and stability. Supportive measures will be emphasized and we will work with her throughout her pregnancy to control her pain. She was concerned about vaginal delivery given her lower back pain as well as issues with hip malalignment and hypermobility. We discussed these are not strict indications for  delivery but any woman has the option for primary  delivery. Mode of delivery can be further discussed in her future pregnancy. In her future pregnancy, consultation with anesthesiology is advised to discuss risks of neuraxial anesthesia in the setting of her lower back pain and multiple procedures. PAST SURGICAL HISTORY:  H/O hernia repair inquinal    Presence of stent in coronary artery 2 stents Dec 2019

## 2023-10-19 NOTE — ASSESSMENT & PLAN NOTE
I recommend that she be up-to-date on all preventive health, including pap smears, mammography, and colonoscopy (if indicated). Vaccines should be up-to-date as well, including annual influenza vaccination. COVID-19 vaccination is recommended, and mRNA vaccines (Shelli Acre) are favored in women of reproductive age. I recommend her immunity to vaccine-preventable illnesses including Varicella, Measles/Mumps/Rubella, and Hepatitis B be assessed by checking antibody titers (IgG), and that she receive boosters for any waning immunity.

## 2023-10-19 NOTE — ASSESSMENT & PLAN NOTE
Frank Martinez reports a history of chronic joint pain and multiple joint injuries. She reports she has hypermobility and concern was raised by orthopedics for an underlying connective tissue disorder. She is uncertain whether she has complications with wound healing. She states her dad and sister also have hypermobility. No one in her family has a proven/genetic diagnosis of a connective tissue disorder. There is no history of sudden cardiac death. She was seen by a rheumatologist in  who attributed her symptoms to benign joint hypermobility. Work-up for inflammatory process or autoimmune condition was negative. Frank Martinez has never been evaluated by a medical geneticist or connective tissue disorder specialist.    We discussed pregnancy leads to increased joint laxity and therefore may worsen her symptoms of joint pain. Some connective tissue disorders can have great implications for pregnancy, particularly vascular Ro-Danlos syndrome, but this diagnosis is less likely given her lack of predominant features. If she has a connective tissue disorder, she may be at increased risk for pregnancy complications such as  labor and delivery. Referral to a medical geneticist was offered and she accepted. An echo was also ordered today to assess her cardiac function and aortic root, especially as she does have a history of palpitations and a heart murmur.

## 2023-10-19 NOTE — ASSESSMENT & PLAN NOTE
I recommend that she be up-to-date on all preventive health, including pap smears, mammography, and colonoscopy (if indicated). Vaccines should be up-to-date as well, including annual influenza vaccination. COVID-19 vaccination is recommended, and mRNA vaccines (Elle Dany) are favored in women of reproductive age. I recommend her immunity to vaccine-preventable illnesses including Varicella, Measles/Mumps/Rubella, and Hepatitis B be assessed by checking antibody titers (IgG), and that she receive boosters for any waning immunity.

## 2023-10-19 NOTE — PROGRESS NOTES
PRECONCEPTION CONSULTATION: MATERNAL-FETAL MEDICINE     Virtual Regular Visit    Verification of patient location: [place] is located in the following state in which I hold an active license [place]. The patient was identified by name and date of birth. She was informed that this is a telemedicine visit and that the visit is being conducted through the [employer]. She agrees to proceed. My office door was closed. No one else was in the room. She acknowledged consent and understanding of privacy and security of the video platform. The patient has agreed to participate and understands they can discontinue the visit at any time. Patient is aware this is a billable service. Assessment and Plan: Ms. Jose Canas is a 28 y.o. Forrest Rink here for preconception counseling. By issue:    Problem List Items Addressed This Visit       Lumbar disc disease with radiculopathy     Veronica Larios has chronic lower back pain. She has herniated lumbar/sacral discs and facet arthropathy. She has been seen by neurology and orthopedics for this issue. Treatment has consisted of injections, nerve blocks, cauterization and supportive measures such as exercises and physical therapy. She does not take pain medication. We reviewed pregnancy can exacerbate back pain given the effect of the gravid uterus on her center of gravity and core strength and stability. Supportive measures will be emphasized and we will work with her throughout her pregnancy to control her pain. She was concerned about vaginal delivery given her lower back pain as well as issues with hip malalignment and hypermobility. We discussed these are not strict indications for  delivery but any woman has the option for primary  delivery. Mode of delivery can be further discussed in her future pregnancy.   In her future pregnancy, consultation with anesthesiology is advised to discuss risks of neuraxial anesthesia in the setting of her lower back pain and multiple procedures. Class 1 obesity without serious comorbidity with body mass index (BMI) of 31.0 to 31.9 in adult     Pregnant women with BMI>30 should aim to limit weight gain of 11-20 pounds ideally via healthy diet and regular exercise, as elevated maternal BMI confers increased risks of preeclampsia, GDM, cardiac dysfunction, sleep apnea,  delivery, and VTE, and fetal risks include miscarriage, fetal anomalies (along with reduced detection), and stillbirth, macrosomia and impaired growth. Weight loss via diet and exercise was recommended prior to conception in order to reduce her risk of the aforementioned complications and to optimize pregnancy outcomes. Migraines     We discussed her history of migraines. She reports infrequent migraines though when she does get one, it can last for over a day. She reports taking Compazine as well as Maxalt. As migraine may be modulated by fluctuations in estrogen levels, most women do report improvement over the course of pregnancy. Others report no change while others report worsening. Regarding her current medications, we discussed Compazine may be safely taken in pregnancy for migraines. Regarding Maxalt, we discussed triptans may be used sparingly in pregnancy if needed as there are theoretical concerns about uterine vasoconstriction and impaired placental perfusion. They do not appear teratogenic. Therefore, when pregnant, she should favor Compazine over Maxalt for abortive therapy. Her migraines are not frequent enough to require prophylactic therapy at this point. She was counseled there is no need to alter her migraine medication regimen in advance of trying to conceive, particularly as she requires these medications infrequently. Hypothyroid     [place] Severance has hypothyroidism managed on 50 mcg levothyroxine daily. Last TSH was 3.1.   We reviewed in pregnancy that levothyroxine often needs to be increased to meet the increased demand for thyroid hormone. As long as her hypothyroidism is well controlled, she should not have any significant increased risk of adverse pregnancy outcome. Poorly controlled maternal hypothyroidism does place  the pregnancy at risk for preeclampsia, placental abruption, nonreassuring fetal heart rate,  birth, low birthweight,  morbidity and mortality, neuropsychological and cognitive impairment, and postpartum hemorrhage. Hypermobility of joint - Primary     Tr Serrano reports a history of chronic joint pain and multiple joint injuries. She reports she has hypermobility and concern was raised by orthopedics for an underlying connective tissue disorder. She is uncertain whether she has complications with wound healing. She states her dad and sister also have hypermobility. No one in her family has a proven/genetic diagnosis of a connective tissue disorder. There is no history of sudden cardiac death. She was seen by a rheumatologist in  who attributed her symptoms to benign joint hypermobility. Work-up for inflammatory process or autoimmune condition was negative. Tr Serrano has never been evaluated by a medical geneticist or connective tissue disorder specialist.    We discussed pregnancy leads to increased joint laxity and therefore may worsen her symptoms of joint pain. Some connective tissue disorders can have great implications for pregnancy, particularly vascular Ro-Danlos syndrome, but this diagnosis is less likely given her lack of predominant features. If she has a connective tissue disorder, she may be at increased risk for pregnancy complications such as  labor and delivery. Referral to a medical geneticist was offered and she accepted. An echo was also ordered today to assess her cardiac function and aortic root, especially as she does have a history of palpitations and a heart murmur.          Encounter for preconception consultation     I recommend that she be up-to-date on all preventive health, including pap smears, mammography, and colonoscopy (if indicated). Vaccines should be up-to-date as well, including annual influenza vaccination. COVID-19 vaccination is recommended, and mRNA vaccines (Genella Sakina) are favored in women of reproductive age. I recommend her immunity to vaccine-preventable illnesses including Varicella, Measles/Mumps/Rubella, and Hepatitis B be assessed by checking antibody titers (IgG), and that she receive boosters for any waning immunity. Referring physician:   Martine Piper Md  [address]  [address]  [facility],  [address]    Reason for consultation: preconception consultation    Dear Dr. Oralia Irving,  Thank you for referring patient Brittny Williamson for preconception Maternal-Fetal Medicine consultation regarding joint hypermobility, back pain, hypothyroidism, migraine, and obesity. As you know, Ms. Bruce Agudelo is a 28 y.o. Donna Rafter. She is currently on OCPs but is considering trying to conceive within the next year. Her history is as follows:    Past Medical History:   Diagnosis Date    Anemia     Arthritis     Class 1 obesity without serious comorbidity with body mass index (BMI) of 30.0 to 30.9 in adult     Class 1 obesity without serious comorbidity with body mass index (BMI) of 31.0 to 31.9 in adult     Dysmenorrhea     Hyperlipidemia     Hypothyroid     Iron deficiency anemia     Lumbar herniated disc     s/p LAINEY    Migraines     Ovarian cyst     On OCP per Gyn    Overweight        Past Surgical History:   Procedure Laterality Date    SPINE SURGERY  2019    On record at [facility]    WISDOM TOOTH EXTRACTION         OB History    Para Term  AB Living   0 0 0 0 0 0   SAB IAB Ectopic Multiple Live Births   0 0 0 0 0   Obstetric Comments   Menarche 12    On lo Loestrin- no menstrual bleeding        Gynecologic history: No LMP recorded. (Menstrual status: Birth Control). None.  Has family history of endometriosis but has never been diagnosed herself. She had ovarian cysts as a teenager but no diagnosis of PCOS. Social History     Tobacco Use    Smoking status: Never    Smokeless tobacco: Never   Vaping Use    Vaping Use: Never used   Substance Use Topics    Alcohol use: Yes     Comment: occasion    Drug use: Never       Family History   Problem Relation Age of Onset    No Known Problems Mother     Hypertension Father     Scoliosis Sister     Endometriosis Sister     Ovarian cysts Sister     Melanoma Sister 35    Depression Brother         Anxiety related    Anxiety disorder Brother     Breast cancer Maternal Grandmother     Rheum arthritis Maternal Grandmother         Hands effected    Arthritis Maternal Grandmother         General    Cancer Maternal Grandmother         Breast    Heart disease Maternal Grandfather     Diabetes Maternal Grandfather     Stroke Maternal Grandfather         Stroke and heart attack    Lung cancer Paternal Grandmother      Family history includes diabetes in her grandfather and preeclampsia in her mother but is negative for birth defects, blood clot in legs or lungs, early-onset breast ovarian or colon cancer; Down syndrome or other chromosome problem, genetic condition or carrier state for cystic fibrosis, sickle cell, thalassemia, Daniel-Sachs, or spinal muscular atrophy; hemophilia or von Willebrand's disease. Current Outpatient Medications:     levothyroxine 50 mcg tablet, TAKE 1 TABLET BY MOUTH EVERY DAY, Disp: 90 tablet, Rfl: 1    melatonin 3 mg, Take 3 mg by mouth daily at bedtime 1-2 tabs by mouth QHS. , Disp: , Rfl:     Multiple Vitamin (MULTIVITAMINS PO), Take 1 tablet by mouth daily , Disp: , Rfl:     Norethin-Eth Estrad-Fe Biphas 1 MG-10 MCG / 10 MCG TABS, Take 1 tablet by mouth daily Rx per Gyn, Disp: , Rfl:     prochlorperazine (COMPAZINE) 10 mg tablet, Take 1 tablet (10 mg total) by mouth every 8 (eight) hours as needed for nausea or vomiting (Migraine), Disp: 30 tablet, Rfl: 0    rizatriptan (MAXALT-MLT) 10 mg disintegrating tablet, TAKE 1 TAB BY MOUTH ONCE AS NEEDED FOR MIGRAINE (MAX 30 MG/24 HRS.) MAY REPEAT IN 2 HOURS IF NEEDED, Disp: 9 tablet, Rfl: 0    Allergies   Allergen Reactions    Pollen Extract Sneezing     Itchy eyes     Occupation: works for [employer]. Spouse/Partner Name: Marcello Chavez; Age 28; occupation: works at [employer]. Vitals: not currently breastfeeding. Exam:  General appearance: alert, well appearing, and in no distress. The remainder of her physical examination was deferred as she was here today for consultation and discussion.    -------------------------  At the conclusion of today's encounter, all questions were answered to her satisfaction. Thank you very much for this kind referral and please do not hesitate to contact me with any further questions or concerns. Sincerely,    Violet Kearney MD  Attending Physician, [address] [place] [address]    I spent 32 minutes directly with the patient during this visit    VIRTUAL VISIT [address] [facility] verbally agrees to participate in [employer]. Patient is aware that [employer] could be limited without vital signs or the ability to perform a full hands-on physical exam. Tyrell Edward understands she or the provider may request at any time to terminate the video visit and request the patient to seek care or treatment in person.

## 2023-10-19 NOTE — ASSESSMENT & PLAN NOTE
Michele Barba has hypothyroidism managed on 50 mcg levothyroxine daily. Last TSH was 3.1. We reviewed in pregnancy that levothyroxine often needs to be increased to meet the increased demand for thyroid hormone. As long as her hypothyroidism is well controlled, she should not have any significant increased risk of adverse pregnancy outcome. Poorly controlled maternal hypothyroidism does place  the pregnancy at risk for preeclampsia, placental abruption, nonreassuring fetal heart rate,  birth, low birthweight,  morbidity and mortality, neuropsychological and cognitive impairment, and postpartum hemorrhage.

## 2023-12-03 ENCOUNTER — APPOINTMENT (OUTPATIENT)
Dept: LAB | Facility: HOSPITAL | Age: 33
End: 2023-12-03
Payer: COMMERCIAL

## 2023-12-03 DIAGNOSIS — E03.9 MYXEDEMA HEART DISEASE: ICD-10-CM

## 2023-12-03 DIAGNOSIS — I51.9 MYXEDEMA HEART DISEASE: ICD-10-CM

## 2023-12-03 DIAGNOSIS — M79.10 MYALGIA: ICD-10-CM

## 2023-12-03 DIAGNOSIS — E78.5 HYPERLIPIDEMIA, UNSPECIFIED HYPERLIPIDEMIA TYPE: ICD-10-CM

## 2023-12-03 DIAGNOSIS — E66.9 OBESITY, UNSPECIFIED CLASSIFICATION, UNSPECIFIED OBESITY TYPE, UNSPECIFIED WHETHER SERIOUS COMORBIDITY PRESENT: ICD-10-CM

## 2023-12-03 DIAGNOSIS — G43.009 MIGRAINE WITHOUT AURA AND WITHOUT STATUS MIGRAINOSUS, NOT INTRACTABLE: ICD-10-CM

## 2023-12-03 LAB
25(OH)D3 SERPL-MCNC: 32.1 NG/ML (ref 30–100)
ALBUMIN SERPL BCP-MCNC: 4.3 G/DL (ref 3.5–5)
ALP SERPL-CCNC: 48 U/L (ref 34–104)
ALT SERPL W P-5'-P-CCNC: 14 U/L (ref 7–52)
ANION GAP SERPL CALCULATED.3IONS-SCNC: 5 MMOL/L
AST SERPL W P-5'-P-CCNC: 16 U/L (ref 13–39)
BILIRUB SERPL-MCNC: 0.43 MG/DL (ref 0.2–1)
BUN SERPL-MCNC: 15 MG/DL (ref 5–25)
CALCIUM SERPL-MCNC: 9.3 MG/DL (ref 8.4–10.2)
CHLORIDE SERPL-SCNC: 106 MMOL/L (ref 96–108)
CHOLEST SERPL-MCNC: 191 MG/DL
CO2 SERPL-SCNC: 26 MMOL/L (ref 21–32)
CREAT SERPL-MCNC: 0.99 MG/DL (ref 0.6–1.3)
GFR SERPL CREATININE-BSD FRML MDRD: 75 ML/MIN/1.73SQ M
GLUCOSE P FAST SERPL-MCNC: 87 MG/DL (ref 65–99)
HDLC SERPL-MCNC: 69 MG/DL
LDLC SERPL CALC-MCNC: 109 MG/DL (ref 0–100)
LDLC SERPL DIRECT ASSAY-MCNC: 127 MG/DL (ref 0–100)
MAGNESIUM SERPL-MCNC: 2.2 MG/DL (ref 1.9–2.7)
NONHDLC SERPL-MCNC: 122 MG/DL
POTASSIUM SERPL-SCNC: 4.7 MMOL/L (ref 3.5–5.3)
PROT SERPL-MCNC: 7.3 G/DL (ref 6.4–8.4)
SODIUM SERPL-SCNC: 137 MMOL/L (ref 135–147)
TRIGL SERPL-MCNC: 67 MG/DL
TSH SERPL DL<=0.05 MIU/L-ACNC: 3.09 UIU/ML (ref 0.45–4.5)

## 2023-12-03 PROCEDURE — 80061 LIPID PANEL: CPT

## 2023-12-03 PROCEDURE — 36415 COLL VENOUS BLD VENIPUNCTURE: CPT

## 2023-12-03 PROCEDURE — 80053 COMPREHEN METABOLIC PANEL: CPT

## 2023-12-03 PROCEDURE — 84443 ASSAY THYROID STIM HORMONE: CPT

## 2023-12-03 PROCEDURE — 82306 VITAMIN D 25 HYDROXY: CPT

## 2023-12-03 PROCEDURE — 83721 ASSAY OF BLOOD LIPOPROTEIN: CPT

## 2023-12-03 PROCEDURE — 83735 ASSAY OF MAGNESIUM: CPT

## 2023-12-05 ENCOUNTER — OFFICE VISIT (OUTPATIENT)
Dept: FAMILY MEDICINE CLINIC | Facility: CLINIC | Age: 33
End: 2023-12-05
Payer: COMMERCIAL

## 2023-12-05 VITALS
BODY MASS INDEX: 32.71 KG/M2 | DIASTOLIC BLOOD PRESSURE: 82 MMHG | TEMPERATURE: 99 F | WEIGHT: 208.4 LBS | SYSTOLIC BLOOD PRESSURE: 124 MMHG | HEART RATE: 74 BPM | OXYGEN SATURATION: 100 % | RESPIRATION RATE: 16 BRPM | HEIGHT: 67 IN

## 2023-12-05 DIAGNOSIS — M51.16 LUMBAR DISC DISEASE WITH RADICULOPATHY: ICD-10-CM

## 2023-12-05 DIAGNOSIS — N94.6 DYSMENORRHEA: ICD-10-CM

## 2023-12-05 DIAGNOSIS — E66.9 CLASS 1 OBESITY WITHOUT SERIOUS COMORBIDITY WITH BODY MASS INDEX (BMI) OF 32.0 TO 32.9 IN ADULT, UNSPECIFIED OBESITY TYPE: ICD-10-CM

## 2023-12-05 DIAGNOSIS — G43.009 MIGRAINE WITHOUT AURA AND WITHOUT STATUS MIGRAINOSUS, NOT INTRACTABLE: ICD-10-CM

## 2023-12-05 DIAGNOSIS — E78.5 HYPERLIPIDEMIA, UNSPECIFIED HYPERLIPIDEMIA TYPE: Primary | ICD-10-CM

## 2023-12-05 DIAGNOSIS — D50.9 IRON DEFICIENCY ANEMIA, UNSPECIFIED IRON DEFICIENCY ANEMIA TYPE: ICD-10-CM

## 2023-12-05 DIAGNOSIS — E66.9 OBESITY (BMI 30.0-34.9): ICD-10-CM

## 2023-12-05 DIAGNOSIS — Z13.6 SCREENING FOR CARDIOVASCULAR CONDITION: ICD-10-CM

## 2023-12-05 DIAGNOSIS — E03.9 HYPOTHYROIDISM, UNSPECIFIED TYPE: ICD-10-CM

## 2023-12-05 PROCEDURE — 99214 OFFICE O/P EST MOD 30 MIN: CPT | Performed by: FAMILY MEDICINE

## 2023-12-05 RX ORDER — PROCHLORPERAZINE MALEATE 10 MG
10 TABLET ORAL EVERY 8 HOURS PRN
Qty: 30 TABLET | Refills: 0 | Status: SHIPPED | OUTPATIENT
Start: 2023-12-05

## 2023-12-05 RX ORDER — LEVOTHYROXINE SODIUM 0.05 MG/1
TABLET ORAL
Qty: 90 TABLET | Refills: 1 | Status: SHIPPED | OUTPATIENT
Start: 2023-12-05

## 2023-12-05 NOTE — PROGRESS NOTES
Assessment/Plan:  Problem List Items Addressed This Visit        Endocrine    Hypothyroid     Euthyroid. Continue Synthroid 50 mcg daily. Relevant Medications    levothyroxine 50 mcg tablet    Other Relevant Orders    TSH, 3rd generation with Free T4 reflex       Cardiovascular and Mediastinum    Migraines     Stable on Maxalt MLT 10mg PRN and Compazine 10mg PRN. For headache and migraine prevention I would suggest a combination vitamin supplement which may include 1 or more of the following ingredients:  Magnesium 400 mg , Riboflavin (vitamin B2) 400 - 600 mg,  Butterbur 150 mg (PA free). Other ingredients that may be helpful are feverfew, coenzyme Q10 100 mg three times a day,  melatonin and christina. Some example brands that combine some of these ingredients are Migravent or Dolovent. Relevant Medications    prochlorperazine (COMPAZINE) 10 mg tablet    Other Relevant Orders    Magnesium       Nervous and Auditory    Lumbar disc disease with radiculopathy     Management per Pain Management - overdue for appointment. Improved s/p radiofrequency ablation. Genitourinary    Dysmenorrhea     On OCP per Gyn. Other    Class 1 obesity without serious comorbidity with body mass index (BMI) of 32.0 to 32.9 in adult     Worsening. Recommend lifestyle modifications. Patient previously declined weight management consult. To consider GLP1-agonist?           Hyperlipidemia - Primary     Stable s statin  Recommend lifestyle modifications. Relevant Orders    CBC and differential    Comprehensive metabolic panel    Lipid panel    TSH, 3rd generation with Free T4 reflex    LDL cholesterol, direct    Iron deficiency anemia     Stable. Continue MVI and OCP c Fe. Relevant Orders    CBC and differential   Other Visit Diagnoses     Obesity (BMI 30.0-34. 9)        BMI 32.0-32.9,adult        Screening for cardiovascular condition        Relevant Orders    CBC and differential    Comprehensive metabolic panel    Lipid panel    LDL cholesterol, direct           Return in about 6 months (around 6/5/2024) for Physical / 6mo - Hypothyroid, Migraines, Myalgias, Labs. Future Appointments   Date Time Provider [address]   6/4/2024  6:00 PM Omar Mc DO [facility]          Subjective:     Maura Roblero is a 28 y.o. female who presents today for a follow-up on her chronic medical conditions. HPI:  Chief Complaint   Patient presents with   • Follow-up     6 mo f/u and med refills. • HM     Declines flu shot today due to upcoming travel. No additional covid boosters. -- Above per clinical staff and reviewed. --      HPI      Today:    Return in about 5 months (around 11/2/2023) for (around 11/2/2023) for 6mo - Hypothyroid, Migraines, Myalgias, Obesity, Labs     6mo OV    Plans TTC in 2024. Obesity - Watching diet. +Exercise - Works out for 45-60 minutes - Cardio, lifting, 7 days per week. She is no longer working with a dietician at her gym. Hypothyroidism - Taking Synthroid regularly and properly. Negative Thyroid antibodies 12/12/20. H/O Fe Def Anemia - used to take oral supplements, then D/C due to GI upset and symptom improvement. On MVI and OCP contains iron. Palpitations - Occurs less than 1-2 times per week, previously occurring nightly. Worse c increased stress. Last saw [facility] Dr. Molly Grimes 5/9/19, 1/19/20. Stable stress test 5/16/19. Last Holter monitor 11/5/20 - Stable. Migraines - On Compazine 10mg q8 hours PRN - has not used recently, and Maxalt-MLT 10mg PRN - not needing to us 2nd dose - helpful. Improved with improvement in back pain - sleeping better without Melatonin 3-6mg nightly. Symptoms since 15yo. Migraines occur 1-2 times monthly. Using Excedrin PRN - helpful. Improves c sleep, ice pack, bitemporal massage. Denies aura. Occurs upon awakening in the AM or at the end of the day. Bi-temporal headache, across forhead, in band distribution. Intense pressure. Worse c movement, photophobia, phonophobia. Sometimes has associated nausea and vertigo. Sometimes lasts 6-12 hours. Pushes fluids - about  oz water daily. Previously on Elavil at 13yo - made her paul. PHQ-2/9 Depression Screening    Little interest or pleasure in doing things: 0 - not at all  Feeling down, depressed, or hopeless: 0 - not at all  Trouble falling or staying asleep, or sleeping too much: 0 - not at all  Feeling tired or having little energy: 0 - not at all  Poor appetite or overeatin - not at all  Feeling bad about yourself - or that you are a failure or have let yourself or your family down: 0 - not at all  Trouble concentrating on things, such as reading the newspaper or watching television: 0 - not at all  Moving or speaking so slowly that other people could have noticed. Or the opposite - being so fidgety or restless that you have been moving around a lot more than usual: 0 - not at all  Thoughts that you would be better off dead, or of hurting yourself in some way: 0 - not at all  PHQ-2 Score: 0  PHQ-2 Interpretation: Negative depression screen  PHQ-9 Score: 0   PHQ-9 Interpretation: No or Minimal depression        LINDA-7 Flowsheet Screening    Flowsheet Row Most Recent Value   Over the last 2 weeks, how often have you been bothered by any of the following problems? Feeling nervous, anxious, or on edge 0   Not being able to stop or control worrying 0   Worrying too much about different things 0   Trouble relaxing 0   Being so restless that it is hard to sit still 0   Becoming easily annoyed or irritable 0   Feeling afraid as if something awful might happen 0   LINDA-7 Total Score 0           Herniated Lumbar Disc - Management per Pain Management per Dr. Washington Blas for back pain. Overdue for appt . Stable with exercise. Still feels tightness in her back.   Feels as though her muscles spasm so much 1-2 days after exercise to pull her shoulder and knee cap out of joint. 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. Referred to Physiatry. [place] 11[place] [facility] [place] [facility]/24/19, s/p Lumbar Spine MRI 10/5/19 -   Showed small broad-based central perfusion protrusion type disc herniation at L5/S1. No significant central foraminal narrowing, s/p facet radio-frequency ablation 1/8/20 per pain Management. Reviewed:  Labs 12/3/23, Cardio 5/9/19, Gyn 6/21/23, Physiatry 10/5/19, Pain Management 5/19/20, Rheum 2/18/21, MFM 10/19/23     Dysmenorrhea - Sees Gyn Dr. Janel Acosta at [facility] yearly - Next appt 6/24. Skips placebo week of OCP. The following portions of the patient's history were reviewed and updated as appropriate: allergies, current medications, past family history, past medical history, past social history, past surgical history and problem list.      Review of Systems   Constitutional:  Negative for appetite change, chills, diaphoresis, fatigue and fever. Respiratory:  Negative for chest tightness and shortness of breath. Cardiovascular:  Negative for chest pain. Gastrointestinal:  Negative for abdominal pain, blood in stool, diarrhea, nausea and vomiting. Genitourinary:  Negative for dysuria. Musculoskeletal:  Positive for back pain. Neurological:  Positive for headaches. Current Outpatient Medications   Medication Sig Dispense Refill   • levothyroxine 50 mcg tablet 1 pill by mouth once daily on an empty stomach, 30 minutes prior to eating food or drink.  90 tablet 1   • Multiple Vitamin (MULTIVITAMINS PO) Take 1 tablet by mouth daily      • Norethin-Eth Estrad-Fe Biphas 1 MG-10 MCG / 10 MCG TABS Take 1 tablet by mouth daily Rx per Gyn     • prochlorperazine (COMPAZINE) 10 mg tablet Take 1 tablet (10 mg total) by mouth every 8 (eight) hours as needed for nausea or vomiting (Migraine) 30 tablet 0   • rizatriptan (MAXALT-MLT) 10 mg disintegrating tablet Take at the onset of migraine; if symptoms continue or return, may take another dose at least 2 hours after first dose. Take no more than 2 doses in a day. 9 tablet 0     No current facility-administered medications for this visit. Objective:  /82   Pulse 74   Temp 99 °F (37.2 °C)   Resp 16   Ht 5' 7" (1.702 m)   Wt 94.5 kg (208 lb 6.4 oz)   SpO2 100%   BMI 32.64 kg/m²    Wt Readings from Last 3 Encounters:   12/05/23 94.5 kg (208 lb 6.4 oz)   06/21/23 92.1 kg (203 lb)   06/06/23 92.5 kg (204 lb)      BP Readings from Last 3 Encounters:   12/05/23 124/82   06/21/23 110/80   06/06/23 126/82          Physical Exam  Vitals and nursing note reviewed. Constitutional:       Appearance: Normal appearance. She is well-developed. She is obese. HENT:      Head: Normocephalic and atraumatic. Eyes:      Conjunctiva/sclera: Conjunctivae normal.   Neck:      Thyroid: No thyromegaly. Cardiovascular:      Rate and Rhythm: Normal rate and regular rhythm. Pulses: Normal pulses. Heart sounds: Normal heart sounds. Pulmonary:      Effort: Pulmonary effort is normal.      Breath sounds: Normal breath sounds. Abdominal:      General: Bowel sounds are normal. There is no distension. Palpations: Abdomen is soft. There is no mass. Tenderness: There is no abdominal tenderness. There is no guarding or rebound. Musculoskeletal:         General: No swelling or tenderness. Cervical back: Neck supple. Right lower leg: No edema. Left lower leg: No edema. Lymphadenopathy:      Cervical: No cervical adenopathy. Neurological:      General: No focal deficit present. Mental Status: She is alert and oriented to person, place, and time. Cranial Nerves: No cranial nerve deficit. Sensory: No sensory deficit. Motor: No weakness.       Coordination: Coordination normal.      Deep Tendon Reflexes: Reflexes normal.   Psychiatric:         Mood and Affect: Mood normal.         Behavior: Behavior normal.         Thought Content: Thought content normal.         Judgment: Judgment normal.         Lab Results:      Lab Results   Component Value Date    WBC 7.35 03/05/2022    HGB 14.2 03/05/2022    HCT 43.8 03/05/2022     03/05/2022    TRIG 67 12/03/2023    HDL 69 12/03/2023    LDLDIRECT 127 (H) 12/03/2023    ALT 14 12/03/2023    AST 16 12/03/2023     07/01/2014    K 4.7 12/03/2023     12/03/2023    CREATININE 0.99 12/03/2023    BUN 15 12/03/2023    CO2 26 12/03/2023    GLUF 87 12/03/2023    HGBA1C 4.8 07/22/2019     Lab Results   Component Value Date    URICACID 4.0 10/22/2020     Invalid input(s): "BASENAME" Vitamin D    No results found. POCT Labs      BMI Counseling: Body mass index is 32.64 kg/m². The BMI is above normal. Exercise recommendations include exercising 3-5 times per week. Depression Screening and Follow-up Plan: Patient was screened for depression during today's encounter. They screened negative with a PHQ-2 score of 0. BMI Counseling: Body mass index is 32.64 kg/m². The BMI is above normal. Nutrition recommendations include 3-5 servings of fruits/vegetables daily. Exercise recommendations include exercising 3-5 times per week.

## 2023-12-06 NOTE — PATIENT INSTRUCTIONS
Low normal vitamin D - Recommend start multivitamin and over-the-counter vitamin D3 1000 - 3000 International Units daily. Obesity   AMBULATORY CARE:   Obesity  means your body mass index (BMI) is greater than 30. Your healthcare provider will use your age, height, and weight to measure your BMI. The risks of obesity include  many health problems, including injuries or physical disability. Diabetes (high blood sugar level)    High blood pressure or high cholesterol    Heart disease or heart failure    Stroke    Gallbladder or liver disease    Cancer of the colon, breast, prostate, liver, or kidney    Sleep apnea    Arthritis or gout    Screening  is done to check for health conditions before you have signs or symptoms. If you are 28to 79years old, your blood sugar level may be checked every 3 years for signs of prediabetes or diabetes. Your healthcare provider will check your blood pressure at each visit. High blood pressure can lead to a stroke or other problems. Your provider may check for signs of heart disease, cancer, or other health problems. Seek care immediately if:   You have a severe headache, confusion, or difficulty speaking. You have weakness on one side of your body. You have chest pain, sweating, or shortness of breath. Call your doctor if:   You have symptoms of gallbladder or liver disease, such as pain in your upper abdomen. You have knee or hip pain and discomfort while walking. You have symptoms of diabetes, such as intense hunger and thirst, and frequent urination. You have symptoms of sleep apnea, such as snoring or daytime sleepiness. You have questions or concerns about your condition or care. Treatment for obesity  focuses on helping you lose weight to improve your health. Even a small decrease in BMI can reduce the risk for many health problems.  Your healthcare provider will help you set a weight-loss goal.  Lifestyle changes  are the first step in treating obesity. These include making healthy food choices and getting regular physical activity. Your healthcare provider may suggest a weight-loss program that involves coaching, education, and therapy. Medicine  may help you lose weight when it is used with a healthy foods and physical activity. Surgery  can help you lose weight if you have obesity along with other health problems. Several types of weight-loss surgery are available. Ask your healthcare provider for more information. Tips for safe weight loss:   Set small, realistic goals. An example of a small goal is to walk for 20 minutes 5 days a week. Anther goal is to lose 5% of your body weight. Ask for support. Tell friends, family members, and coworkers about your goals. Ask someone to lose weight with you. You may also want to join a weight-loss support group. Identify foods or triggers that may cause you to overeat. Remove tempting high-calorie foods from your home and workplace. Place a bowl of fresh fruit on your kitchen counter. If stress causes you to eat, find other ways to cope with stress. A counselor or therapist may be able to help you. Track your daily calories and activity. Write down what you eat and drink. Also write down how many minutes of physical activity you do each day. Track your weekly weight. Weigh yourself in the morning, before you eat or drink anything but after you use the bathroom. Use the same scale, in the same place, and in similar clothing each time. Only weigh yourself 1 to 2 times each week, or as directed. You may become discouraged if you weigh yourself every day. Eating changes: You will need to eat 500 to 1,000 fewer calories each day than you currently eat to lose 1 to 2 pounds a week. The following changes will help you cut calories:  Eat smaller portions. Use small plates, no larger than 9 inches in diameter. Fill your plate half full of fruits and vegetables.  Measure your food using measuring cups until you know what a serving size looks like. Eat 3 meals and 1 or 2 snacks each day. Plan your meals in advance. Pao Carver and eat at home most of the time. Eat slowly. Do not skip meals. Skipping meals can lead to overeating later in the day. This can make it harder for you to lose weight. Talk with a dietitian to help you make a meal plan and schedule that is right for you. Eat fruits and vegetables at every meal.  They are low in calories and high in fiber, which makes you feel full. Do not add butter, margarine, or cream sauce to vegetables. Use herbs to season steamed vegetables. Eat less fat and fewer fried foods. Eat more baked or grilled chicken and fish. These protein sources are lower in calories and fat than red meat. Limit fast food. Dress your salads with olive oil and vinegar instead of bottled dressing. Limit the amount of sugar you eat. Do not drink sugary beverages. Limit alcohol. Activity changes:  Physical activity is good for your body in many ways. It helps you burn calories and build strong muscles. It decreases stress and depression, and improves your mood. It can also help you sleep better. Talk to your healthcare provider before you begin an exercise program.  Exercise for at least 30 minutes 5 days a week. Start slowly. Set aside time each day for physical activity that you enjoy and that is convenient for you. It is best to do both weight training and an activity that increases your heart rate, such as walking, bicycling, or swimming. Find ways to be more active. Do yard work and housecleaning. Walk up the stairs instead of using elevators. Spend your leisure time going to events that require walking, such as outdoor festivals or fairs. This extra physical activity can help you lose weight and keep it off. Follow up with your doctor as directed: You may need to meet with a dietitian.  Write down your questions so you remember to ask them during your visits. © Copyright [employer] 2023 Information is for End User's use only and may not be sold, redistributed or otherwise used for commercial purposes. The above information is an  only. It is not intended as medical advice for individual conditions or treatments. Talk to your doctor, nurse or pharmacist before following any medical regimen to see if it is safe and effective for you.

## 2023-12-08 NOTE — ASSESSMENT & PLAN NOTE
Stable on Maxalt MLT 10mg PRN and Compazine 10mg PRN. For headache and migraine prevention I would suggest a combination vitamin supplement which may include 1 or more of the following ingredients:  Magnesium 400 mg , Riboflavin (vitamin B2) 400 - 600 mg,  Butterbur 150 mg (PA free). Other ingredients that may be helpful are feverfew, coenzyme Q10 100 mg three times a day,  melatonin and christina. Some example brands that combine some of these ingredients are Migravent or Dolovent.

## 2023-12-08 NOTE — ASSESSMENT & PLAN NOTE
Worsening. Recommend lifestyle modifications. Patient previously declined weight management consult.   To consider GLP1-agonist?

## 2024-05-06 DIAGNOSIS — G43.009 MIGRAINE WITHOUT AURA AND WITHOUT STATUS MIGRAINOSUS, NOT INTRACTABLE: ICD-10-CM

## 2024-05-06 RX ORDER — RIZATRIPTAN BENZOATE 10 MG/1
TABLET, ORALLY DISINTEGRATING ORAL
Qty: 9 TABLET | Refills: 0 | Status: SHIPPED | OUTPATIENT
Start: 2024-05-06

## 2024-05-21 ENCOUNTER — OFFICE VISIT (OUTPATIENT)
Dept: FAMILY MEDICINE CLINIC | Facility: CLINIC | Age: 34
End: 2024-05-21
Payer: COMMERCIAL

## 2024-05-21 VITALS
SYSTOLIC BLOOD PRESSURE: 122 MMHG | TEMPERATURE: 98.1 F | OXYGEN SATURATION: 99 % | DIASTOLIC BLOOD PRESSURE: 82 MMHG | BODY MASS INDEX: 32.15 KG/M2 | HEART RATE: 71 BPM | WEIGHT: 204.8 LBS | RESPIRATION RATE: 16 BRPM | HEIGHT: 67 IN

## 2024-05-21 DIAGNOSIS — M25.551 RIGHT HIP PAIN: Primary | ICD-10-CM

## 2024-05-21 DIAGNOSIS — M79.604 LOW BACK PAIN RADIATING TO RIGHT LEG: ICD-10-CM

## 2024-05-21 DIAGNOSIS — M54.50 LOW BACK PAIN RADIATING TO RIGHT LEG: ICD-10-CM

## 2024-05-21 PROCEDURE — 99213 OFFICE O/P EST LOW 20 MIN: CPT | Performed by: FAMILY MEDICINE

## 2024-05-21 NOTE — PROGRESS NOTES
"Assessment/Plan:       Problem List Items Addressed This Visit    None  Visit Diagnoses     Right hip pain    -  Primary    Relevant Orders    Ambulatory Referral to Orthopedic Surgery    Ambulatory Referral to Physical Therapy    Low back pain radiating to right leg        Relevant Orders    Ambulatory Referral to Physical Therapy                 Most recent labs reviewed     Subjective:     Sudheer is a 33 y.o. female here today with chief complaint below:  Chief Complaint   Patient presents with   • Back Pain     Patient has a hx of chronic back injuries, which causes a lot of hip pain. Patient was running and her hip gave out. Patient states that it couldn't hold her weight. Patient states that she is having a hard time standing for a long period of time and keeping posture is difficult and causes pain. Patient is concerns.    • Hip Pain     Patient states that she hears clicking noises coming from her right side hip.      - CC above per clinical staff and reviewed.    HPI:  Chronic back issues.    About a month or so ago, was running on treadmill, but R hip went out, couldn't bear weight.  Fell on treadmill.    Happened again while walking dog, was able to catch herself.     She exercises regularly.  Hip pain more often when she is exercising    Hard to stay upright when standing.    Traveling more, hotel beds, out of usual routine.     Had seen pain mgmt and had injections in the past    Uses tylenol/ibuprofen prn pain  Did PT with relief in the past        The following portions of the patient's history were reviewed and updated as appropriate: allergies, current medications, past family history, past medical history, past social history, past surgical history and problem list.    ROS:  Review of Systems     Objective:      /82 (BP Location: Left arm, Patient Position: Sitting, Cuff Size: Standard)   Pulse 71   Temp 98.1 °F (36.7 °C) (Temporal)   Resp 16   Ht 5' 7\" (1.702 m)   Wt 92.9 kg (204 lb 12.8 " oz)   SpO2 99%   BMI 32.08 kg/m²   BP Readings from Last 3 Encounters:   05/21/24 122/82   12/05/23 124/82   06/21/23 110/80     Wt Readings from Last 3 Encounters:   05/21/24 92.9 kg (204 lb 12.8 oz)   12/05/23 94.5 kg (208 lb 6.4 oz)   06/21/23 92.1 kg (203 lb)               Physical Exam:   Physical Exam  Vitals and nursing note reviewed.   Constitutional:       Appearance: Normal appearance.   Cardiovascular:      Rate and Rhythm: Normal rate.   Pulmonary:      Effort: Pulmonary effort is normal.      Breath sounds: Normal breath sounds.   Musculoskeletal:      Comments: Pain with internal > external rotation of the R hip  Normal ROM L hip  Normal gait and strength   Neurological:      Mental Status: She is alert.      Gait: Gait normal.   Psychiatric:         Mood and Affect: Mood normal.         Behavior: Behavior normal.

## 2024-05-22 ENCOUNTER — DOCUMENTATION (OUTPATIENT)
Facility: HOSPITAL | Age: 34
End: 2024-05-22

## 2024-05-22 NOTE — PROGRESS NOTES
Prior authorization obtained for cpt code(s) 44800. Submitted via [employer].     Reason for testing: Diagnosis: Hypermobility of joint.     Patient information provided.  Patient name Sudheer Ortega    Date of birth 1990    Address [address] [facility] [address]    Phone number [phone]    Insurance [employer]    ID# [id number]      Provider information:  Ordering provider's name and NPI [facility]     NPI: [id number]   Address [facility] located at [address] [place] [address]   Phone: [phone]   Fax [phone]   [facility] Tax ID  # [id number]    Group # # [id number]    Performing [place] NPI [facility] NPI# [id number]     Insurance Authorization is not a guarantee of payment and final determination is based on your member benefits, appropriateness of service provided and eligibility at time of services rendered and claim received.     Predetermination: Authorization request has been successfully submitted. As the Requesting Provider, I agree to notify my patient, the [facility] member, of the outcome of this authorization request. The approval of services are based on medical necessity and is not a guarantee of payment; payment is based on eligibility and benefits at the time of service. By utilizing this system you are agreeing to the above disclaimer statement.

## 2024-06-03 ENCOUNTER — AMB VIDEO VISIT (OUTPATIENT)
Dept: OTHER | Facility: HOSPITAL | Age: 34
End: 2024-06-03

## 2024-06-03 VITALS — TEMPERATURE: 97 F

## 2024-06-03 DIAGNOSIS — J32.9 SINUSITIS, UNSPECIFIED CHRONICITY, UNSPECIFIED LOCATION: Primary | ICD-10-CM

## 2024-06-03 DIAGNOSIS — G43.009 MIGRAINE WITHOUT AURA AND WITHOUT STATUS MIGRAINOSUS, NOT INTRACTABLE: ICD-10-CM

## 2024-06-03 PROCEDURE — ECARE PR SL URGENT CARE VIRTUAL VISIT: Performed by: NURSE PRACTITIONER

## 2024-06-03 RX ORDER — AMOXICILLIN 875 MG/1
875 TABLET, COATED ORAL 2 TIMES DAILY
Qty: 20 TABLET | Refills: 0 | Status: SHIPPED | OUTPATIENT
Start: 2024-06-03 | End: 2024-06-13

## 2024-06-03 RX ORDER — FLUTICASONE PROPIONATE 50 MCG
1 SPRAY, SUSPENSION (ML) NASAL DAILY
Qty: 16 G | Refills: 0 | Status: SHIPPED | OUTPATIENT
Start: 2024-06-03 | End: 2024-06-10

## 2024-06-03 RX ORDER — RIZATRIPTAN BENZOATE 10 MG/1
TABLET, ORALLY DISINTEGRATING ORAL
Qty: 9 TABLET | Refills: 1 | Status: SHIPPED | OUTPATIENT
Start: 2024-06-03

## 2024-06-03 NOTE — PATIENT INSTRUCTIONS
Start antibiotic.  Take probiotic.  OTC cough and cold. Start flonase.  Follow up with PCP if no improvement.  Go to ER with any worsening symptoms.

## 2024-06-03 NOTE — PROGRESS NOTES
Required Documentation:  Encounter provider: SHAYY Ga    Identify all parties in room with patient during virtual visit:  No one else    The patient was identified by name and date of birth. Sudheer Ortega was informed that this is a telemedicine visit and that the visit is being conducted through the "[employer]" platform. She agrees to proceed..  My office door was closed. No one else was in the room.  She acknowledged consent and understanding of privacy and security of the video platform. The patient has agreed to participate and understands they can discontinue the visit at any time.    Verification of patient location:  Patient is located at Home in the following state in which I hold an active license [place]    Patient is aware this is a billable service.     Reason for visit is No chief complaint on file.       Subjective  This is a 33 year old female here today for video visit.  She states about 1 week ago she started with allergy medication.  She states 2 days ago she started to get in sinus pressure and pain.  She states the pain is worse and pain is radiating to right ear.  No fever. No chest pain or sob.  She is not using Flonase.  She states last week she had some yellow nasal discharge.            Past Medical History:   Diagnosis Date    Anemia 2005    Arthritis 2005    Class 1 obesity without serious comorbidity with body mass index (BMI) of 30.0 to 30.9 in adult     Class 1 obesity without serious comorbidity with body mass index (BMI) of 31.0 to 31.9 in adult     Dysmenorrhea     Hyperlipidemia     Hypothyroid     Iron deficiency anemia     Lumbar herniated disc     s/p LAINEY    Migraines     Ovarian cyst     On OCP per Gyn    Overweight        Past Surgical History:   Procedure Laterality Date    SPINE SURGERY  12/2019    On record at [facility] TOOTH EXTRACTION          Allergies   Allergen Reactions    Pollen Extract Sneezing     Itchy eyes       Review of Systems    Constitutional:  Negative for activity change, chills, fatigue and fever.   HENT:  Positive for congestion, rhinorrhea, sinus pressure and sinus pain.    Respiratory:  Negative for cough and shortness of breath.    Cardiovascular: Negative.    Neurological: Negative.    Psychiatric/Behavioral: Negative.         Video Exam    Vitals:    06/03/24 1637   Temp: (!) 97 °F (36.1 °C)       Physical Exam  Constitutional:       General: She is not in acute distress.     Appearance: Normal appearance. She is not ill-appearing or toxic-appearing.   HENT:      Head: Normocephalic and atraumatic.      Comments: Right maxillary sinus pressure.      Nose: Congestion present.   Eyes:      Conjunctiva/sclera: Conjunctivae normal.   Pulmonary:      Effort: Pulmonary effort is normal. No respiratory distress.   Neurological:      Mental Status: She is alert and oriented to person, place, and time.   Psychiatric:         Mood and Affect: Mood normal.         Behavior: Behavior normal.         Thought Content: Thought content normal.         Judgment: Judgment normal.         Visit Time  Total Visit Duration: 6 minutes    Assessment/Plan:    Diagnoses and all orders for this visit:    Sinusitis, unspecified chronicity, unspecified location  -     amoxicillin (AMOXIL) 875 mg tablet; Take 1 tablet (875 mg total) by mouth 2 (two) times a day for 10 days  -     fluticasone (FLONASE) 50 mcg/act nasal spray; 1 spray into each nostril daily for 7 days        Patient Instructions   Start antibiotic.  Take probiotic.  OTC cough and cold. Start flonase.  Follow up with PCP if no improvement.  Go to ER with any worsening symptoms.

## 2024-06-10 NOTE — CARE ANYWHERE EVISITS
Visit Summary for Sudheer Ortega - Gender: Female - Date of Birth: 1990  Date: [id number] - Duration: 4 minutes  Patient: Sudheer Ortega  Provider: Hortencia LUCAS    Patient Contact Information  Address  [address] [facility]; [place] [address]  [phone]    Visit Topics  Sinus congestion  [Added By: Self - 2024-06-03]    Triage Questions   What is your current physical address in the event of a medical emergency? Answer []  Are you allergic to any medications? Answer []  Are you now or could you be pregnant? Answer []  Do you have any immune system compromise or chronic lung   disease? Answer []  Do you have any vulnerable family members in the home (infant, pregnant, cancer, elderly)? Answer []     Conversation Transcripts  [0A][0A] [Notification] You are connected with Hortencia LUCAS, Urgent Care Specialist.[0A][Notification] Sudheer Ortega is located in [place].[0A][Notification] Sudheer Ortega has shared health history...[0A]    Diagnosis  Acute sinusitis, unspecified    Procedures  Value: 72420 Code: CPT-4 UNLISTED E&M SERVICE    Medications Prescribed    No prescriptions ordered    Electronically signed by: Hortencia Valdez(NPI [id number])

## 2024-06-22 DIAGNOSIS — E03.9 HYPOTHYROIDISM, UNSPECIFIED TYPE: ICD-10-CM

## 2024-06-22 RX ORDER — LEVOTHYROXINE SODIUM 0.05 MG/1
TABLET ORAL
Qty: 90 TABLET | Refills: 1 | Status: SHIPPED | OUTPATIENT
Start: 2024-06-22

## 2024-07-19 ENCOUNTER — OFFICE VISIT (OUTPATIENT)
Dept: OBGYN CLINIC | Facility: CLINIC | Age: 34
End: 2024-07-19
Payer: COMMERCIAL

## 2024-07-19 ENCOUNTER — APPOINTMENT (OUTPATIENT)
Dept: RADIOLOGY | Facility: AMBULARY SURGERY CENTER | Age: 34
End: 2024-07-19
Payer: COMMERCIAL

## 2024-07-19 VITALS
DIASTOLIC BLOOD PRESSURE: 86 MMHG | WEIGHT: 204 LBS | SYSTOLIC BLOOD PRESSURE: 122 MMHG | BODY MASS INDEX: 32.02 KG/M2 | HEART RATE: 78 BPM | HEIGHT: 67 IN

## 2024-07-19 DIAGNOSIS — M25.551 RIGHT HIP PAIN: Primary | ICD-10-CM

## 2024-07-19 DIAGNOSIS — M25.551 RIGHT HIP PAIN: ICD-10-CM

## 2024-07-19 PROCEDURE — 99204 OFFICE O/P NEW MOD 45 MIN: CPT | Performed by: PHYSICAL MEDICINE & REHABILITATION

## 2024-07-19 PROCEDURE — 73502 X-RAY EXAM HIP UNI 2-3 VIEWS: CPT

## 2024-07-19 RX ORDER — METAXALONE 800 MG/1
800 TABLET ORAL
Qty: 30 TABLET | Refills: 0 | Status: SHIPPED | OUTPATIENT
Start: 2024-07-19

## 2024-07-19 NOTE — PROGRESS NOTES
"1. Right hip pain      Orders Placed This Encounter   Procedures    XR hip/pelv 2-3 vws right if performed    Ambulatory referral to Physical Therapy     New Medications Ordered This Visit   Medications    metaxalone (SKELAXIN) 800 mg tablet     Sig: Take 1 tablet (800 mg total) by mouth daily at bedtime as needed for muscle spasms     Dispense:  30 tablet     Refill:  0     Impression:  Right hip pain likely secondary to internal snapping hip vs labral derangement.  Patient also has lumbar pain secondary to facet syndrome.  She was previously treating with spine and pain and had medial branch ablations.  We discussed different treatment options and decided to proceed with metaxalone at night PRN.  We will have her restart physical therapy.  I will see her back in 6 weeks to reassess.  If still having IA hip pain, can consider USG hip joint injection vs referral for medial branch procedure.    Imaging Studies (I personally reviewed images in PACS and report):  Right hip x-rays most recent to this encounter reviewed.  These images show no acute osseous abnormalities or severe degeneration.    MRI lumbar spine:  \"VERTEBRAL BODIES:  Normal alignment of the lumbar spine.  No spondylolysis or spondylolisthesis. No scoliosis.  No compression fracture.    Normal marrow signal is identified within the visualized bony structures.  No discrete marrow lesion.     SACRUM:  Normal signal within the sacrum. No evidence of insufficiency or stress fracture.     DISTAL CORD AND CONUS:  Normal size and signal within the distal cord and conus.       PARASPINAL SOFT TISSUES:  Paraspinal soft tissues are unremarkable.     LOWER THORACIC DISC SPACES:  Normal disc height and signal.  No disc herniation, canal stenosis or foraminal narrowing.     LUMBAR DISC SPACES:     L1-L2:  Normal.     L2-L3:  Normal.     L3-L4:  Normal.     L4-L5:  Mild facet hypertrophy.  No significant central or foraminal narrowing.     L5-S1:  Broad-based central " "protrusion type disc herniation.  Small marginal osteophytes are noted.  No central or foraminal narrowing.  Mild facet hypertrophy.     IMPRESSION:     Small broad-based central protrusion type disc herniation L5-S1.  No significant central or foraminal narrowing.\"    No follow-ups on file.    Patient is in agreement with the above plan.    HPI:  Sudheer Ortega is a 33 y.o. female  who presents for evaluation of   Chief Complaint   Patient presents with    Right Hip - Pain    Lower Back - Pain       Onset/Mechanism: Chronic low back pain since right before COVID.  She has hip pain over the past few months which she feels like is from compensation.  Location: Groin area and low back.  Radiation: Denies.  Provocative: Walking and exercising.  Severity: Painful.  Associated Symptoms: As above.  Treatment so far: Spine and pain in the past.    Following history reviewed and updated:  Past Medical History:   Diagnosis Date    Anemia 2005    Arthritis 2005    Class 1 obesity without serious comorbidity with body mass index (BMI) of 30.0 to 30.9 in adult     Class 1 obesity without serious comorbidity with body mass index (BMI) of 31.0 to 31.9 in adult     Dysmenorrhea     Hyperlipidemia     Hypothyroid     Iron deficiency anemia     Lumbar herniated disc     s/p LAINEY    Migraines     Ovarian cyst     On OCP per Gyn    Overweight      Past Surgical History:   Procedure Laterality Date    SPINE SURGERY  12/2019    On record at [facility] TOOTH EXTRACTION       Social History   Social History     Substance and Sexual Activity   Alcohol Use Not Currently    Comment: occasion     Social History     Substance and Sexual Activity   Drug Use Never     Social History     Tobacco Use   Smoking Status Never   Smokeless Tobacco Never     Family History   Problem Relation Age of Onset    No Known Problems Mother     Hypertension Father     Scoliosis Sister     Endometriosis Sister     Ovarian cysts Sister     Melanoma Sister 33 " "   Depression Brother         Anxiety related    Anxiety disorder Brother     Breast cancer Maternal Grandmother     Rheum arthritis Maternal Grandmother         Hands effected    Arthritis Maternal Grandmother         General    Cancer Maternal Grandmother         Breast    Heart disease Maternal Grandfather     Diabetes Maternal Grandfather     Stroke Maternal Grandfather         Stroke and heart attack    Lung cancer Paternal Grandmother      Allergies   Allergen Reactions    Pollen Extract Sneezing     Itchy eyes        Constitutional:  /86   Pulse 78   Ht 5' 7\" (1.702 m)   Wt 92.5 kg (204 lb)   BMI 31.95 kg/m²    General: NAD.  Eyes: Anicteric sclerae.  Neck: Supple.  Lungs: Unlabored breathing.  Cardiovascular: No lower extremity edema.  Skin: Intact without erythema.  Neurologic: Sensation intact to light touch.  Psychiatric: Mood and affect are appropriate.    Right Hip Exam     Tests   MAC: positive    Other   Erythema: absent  Scars: absent  Sensation: normal  Pulse: present    Comments:  Pain with FADDIR and passive hip IR.  Weakness with Stinchfield test.             Procedures              "

## 2024-08-01 ENCOUNTER — EVALUATION (OUTPATIENT)
Dept: PHYSICAL THERAPY | Facility: CLINIC | Age: 34
End: 2024-08-01
Payer: COMMERCIAL

## 2024-08-01 VITALS — SYSTOLIC BLOOD PRESSURE: 140 MMHG | DIASTOLIC BLOOD PRESSURE: 86 MMHG

## 2024-08-01 DIAGNOSIS — M54.50 CHRONIC LOW BACK PAIN WITHOUT SCIATICA, UNSPECIFIED BACK PAIN LATERALITY: Primary | ICD-10-CM

## 2024-08-01 DIAGNOSIS — M25.551 RIGHT HIP PAIN: ICD-10-CM

## 2024-08-01 DIAGNOSIS — G89.29 CHRONIC LOW BACK PAIN WITHOUT SCIATICA, UNSPECIFIED BACK PAIN LATERALITY: Primary | ICD-10-CM

## 2024-08-01 PROCEDURE — 97161 PT EVAL LOW COMPLEX 20 MIN: CPT

## 2024-08-01 PROCEDURE — 97530 THERAPEUTIC ACTIVITIES: CPT

## 2024-08-01 PROCEDURE — 97110 THERAPEUTIC EXERCISES: CPT

## 2024-08-01 PROCEDURE — 97112 NEUROMUSCULAR REEDUCATION: CPT

## 2024-08-01 NOTE — PROGRESS NOTES
PT Evaluation     Today's date: 2024  Patient name: Sudheer Ortega  : 1990  MRN: [id number]  Referring provider: Sailaja Hennessy DO  Dx:   Encounter Diagnosis     ICD-10-CM    1. Right hip pain  M25.551 Ambulatory referral to Physical Therapy          Start Time: 1630          Assessment  Impairments: abnormal or restricted ROM, activity intolerance, impaired balance, impaired physical strength, pain with function and poor posture   Other impairment: stability defiicts  Symptom irritability: moderate    Assessment details: Sudheer Ortega  is a pleasant 33 y.o. female  who presents with R hip pain and LBP . Demonstrates gross stability deficits throughout the lumbar and hip region with decreased neuromuscular control and core/ hip activation. Decreased LE strength noted due to control deficits and increased compensatory patterning. Educated on exercise performance and importance of extension and core activation with mobility. Increased ant hip restriction noted due to possible anteversion and decreased quad activation. HEP provided and educated on performance.       Problem List:  1) decreased lumbar stability   2) decreased hip IR/ mobility deficits   3) strength deficits in LE    Tolerated session without adverse effects.  Pt. will benefit from skilled PT services that includes manual therapy techniques to enhance tissue extensibility, neuromuscular re-education to facilitate motor control, therapeutic exercise to increase functional mobility, and modalities prn to reduce pain and inflammation.     Access Code: [email or web address]  URL: [email or web address]  Date: 2024  Prepared by: Kimberley Ruth    Exercises  - Supine Bridge  - 1 x daily - 7 x weekly - 2 sets - 10 reps - 5 hold  - Clamshell  - 1 x daily - 7 x weekly - 2 sets - 10 reps  - Quadruped Alternating Arm Lift  - 1 x daily - 7 x weekly - 2 sets - 10 reps  - Wall Sit  - 1 x daily - 7 x weekly - 5 reps - 10 hold  Understanding of  Dx/Px/POC: good     Prognosis: good    Goals  Impairment Goals  - Pt I with initial HEP in 1-2 visits  - Improve hip ROM to WNL without increased pain or compensation in 4-6 weeks   - Increase strength to 5/5 in all affected areas in 4-6 weeks    Functional Goals  - Increase Functional Status Measure to: MDC in 6-8 weeks  - Patient will be independent with comprehensive HEP in 6-8 weeks  - Patient will return to gym activity without increased compensation or pain in 6-8 weeks   - Stair climbing is improved to prior level of function in 6-8 weeks  - Squatting is improved to prior level of function in 6-8 weeks     Plan  Patient would benefit from: PT eval and skilled physical therapy  Planned modality interventions: thermotherapy: hydrocollator packs, TENS and cryotherapy    Planned therapy interventions: IASTM, joint mobilization, kinesiology taping, manual therapy, neuromuscular re-education, patient/caregiver education, postural training, strengthening, stretching, therapeutic activities, therapeutic exercise and home exercise program    Frequency: 2x week  Duration in weeks: 8  Treatment plan discussed with: patient    Subjective Evaluation    History of Present Illness  Mechanism of injury: Sudheer Ortega presents with c/c of R hip pain, pinching and weakness. Symptoms began 2-3 months ago  with no known mechanism of injury. Notes periodic LBP into SI denied radicular pain. Reports hypermobility throughout all joints and previous LBP and hip pain. Weakness in the R LE intermittent not connected to exercise or rest. Reports clicking and snapping. Reports it feels unstable. Hx of hip dislocation a couple of years ago.   Aggravating factors: Pain with moving inward, walking running and sleeping back: pain with standing prolonged and stomach lying   Relieving factors: not really  24hr pain pattern: 2 /10 (current), 2/10 (best), 7/10 back 8/10  (worst), location:ant hip/ low back , descriptors: hip: pinching/ pulling  "back: dull achy   Imaging: Narrative & Impression        RIGHT HIP     INDICATION:   Pain in right hip.      COMPARISON:  None.     VIEWS:  XR HIP/PELV 2-3 VWS RIGHT W PELVIS IF PERFORMED      FINDINGS:     There is no acute fracture or dislocation.     No significant hip degenerative changes.     No lytic or blastic osseous lesion.     Soft tissues are unremarkable.     Degenerative changes visualized lower lumbar spine.      Previous treatments: not for the hip, LB: nerve ablation and cortisone injections - PT   Occupation/recreation: frequent traveling   Patient goals: \" To just not have that pain- to learn how to not trigger it and prevent it from coming back\"   Currently training for half marathon           Recurrent probem    Quality of life: good        Objective     Concurrent Complaints  Negative for bladder dysfunction, bowel dysfunction and saddle (S4) numbness    Observations     Right Hip  Positive for atrophy.     Palpation     Additional Palpation Details  TTP to B paraspinals with palpable spasming noted throughout     TTP in the ant hip flexor attachment at the groin   No distal quad tenderness  No illiopsoas tenderness noted on R   No palpable protrusion (hernia)       Lumbar Screen  Lumbar range of motion within normal limits with the following exceptions:Hyper mobility with flexion can palm floor   Hyperextension of knees in standing   B paraspinal and QL restriction with SB ROM WNL     Neurological Testing     Sensation     Hip   Left Hip   Intact: light touch    Right Hip   Intact: light touch    Comments   Left light touch: L2-S1.   Right light touch: L2-S1.     Reflexes   Left   Patellar (L4): normal (2+)  Achilles (S1): absent (0)    Right   Patellar (L4): normal (2+)  Achilles (S1): normal (2+)    Active Range of Motion     Right Hip   External rotation (90/90): 50 degrees with pain  Internal rotation (90/90): 40 degrees with pain    Joint Play     Hypermobile: L3, L4 and L5     Pain: L3, " "L4 and L5     Strength/Myotome Testing     Lumbar   Left   Heel walk: normal  Toe walk: normal    Right   Heel walk: normal  Toe walk: normal    Left Hip   Planes of Motion   Flexion: 4+  Extension: 4  Abduction: 4+  Adduction: 4+  External rotation: 4  Internal rotation: 4    Right Hip   Planes of Motion   Flexion: 4  Extension: 3+  Abduction: 4  Adduction: 4+  External rotation: 4-  Internal rotation: 4    Left Knee   Flexion: 4  Extension: 4  Quadriceps contraction: fair    Right Knee   Flexion: 4  Extension: 4  Quadriceps contraction: fair    Additional Strength Details  Heel walking WNL   Hyperextension limits activation     Tests     Lumbar     Right   Positive slump test.     Right Hip   Positive FADIR and scour.   Negative MAC.   Henrique: Negative.    Modified Henrique: Negative.   SLR: Positive.     General Comments:      Hip Comments   Squat: L sided WB increased pain in ANT HIP  SLS: R: 15 sec mod sway  L: mod sway- 25 sec       Flowsheet Rows      Flowsheet Row Most Recent Value   PT/OT G-Codes    Current Score 49   Projected Score 68               Precautions: migraines, hypothyroid, lumbar spondylosis, iron deficiency anemia,       Manuals 8/1            Hip mobs flex/ abd              Cupping to ant thigh              IASTM                           Neuro Re-Ed 8/1            Bridges(HEP) 3\" x 15            Clamshells (HEP) 3\" x 15             Quad core act (HEP) W/ arm lift x 10             Rows/ extensions                                                     Ther Ex 8/1            Wall sits (HEP) 3x20\"            lunges             LP             Tall kneeling              Half kneeling                                                     Ther Activity 8/1            TM             Elliptical              Patient education  KR                         FOTO             Modalities 8/1                                           "

## 2024-08-08 ENCOUNTER — OFFICE VISIT (OUTPATIENT)
Dept: PHYSICAL THERAPY | Facility: CLINIC | Age: 34
End: 2024-08-08
Payer: COMMERCIAL

## 2024-08-08 DIAGNOSIS — M25.551 RIGHT HIP PAIN: Primary | ICD-10-CM

## 2024-08-08 DIAGNOSIS — G89.29 CHRONIC LOW BACK PAIN WITHOUT SCIATICA, UNSPECIFIED BACK PAIN LATERALITY: ICD-10-CM

## 2024-08-08 DIAGNOSIS — M54.50 CHRONIC LOW BACK PAIN WITHOUT SCIATICA, UNSPECIFIED BACK PAIN LATERALITY: ICD-10-CM

## 2024-08-08 PROCEDURE — 97110 THERAPEUTIC EXERCISES: CPT

## 2024-08-08 PROCEDURE — 97530 THERAPEUTIC ACTIVITIES: CPT

## 2024-08-08 PROCEDURE — 97112 NEUROMUSCULAR REEDUCATION: CPT

## 2024-08-08 PROCEDURE — 97140 MANUAL THERAPY 1/> REGIONS: CPT

## 2024-08-08 NOTE — PROGRESS NOTES
"Daily Note     Today's date: 2024  Patient name: Sudheer Ortega  : 1990  MRN: [id number]  Referring provider: Sailaja Hennessy DO  Dx:   Encounter Diagnosis     ICD-10-CM    1. Right hip pain  M25.551       2. Chronic low back pain without sciatica, unspecified back pain laterality  M54.50     G89.29           Start Time: 0730          Subjective: \" The wall sits are shockingly hard- the hip is tight\"       Objective: See treatment diary below      Assessment: Sudheer presents with R hip pain. Note slight improvement in hip mobility post mobilizations. Fatigue noted in the B lat hip with activation. Difficulty disassociating low back and hips with mobility with frequent cuing provided. Tolerated session without adverse effects. Recommend continued skilled therapy to improve overall strength and mobility for functional return with decreased compensation and pain.        Plan: Continue per plan of care.      Precautions: migraines, hypothyroid, lumbar spondylosis, iron deficiency anemia,       Manuals              Hip mobs flex/ abd   Grade III KR            Cupping to ant thigh              IASTM              STM   Stick release KR            Neuro Re-Ed            Bridges(HEP) 3\" x 15 5\" x 20           Clamshells (HEP) 3\" x 15  3\" x 15 B            Quad core act (HEP) W/ arm lift x 10             Rows/ extensions   15#/ 10# 2 x 10            Standing core act  Pball 5\" x 20            Hip abduction   GTB x 20 B                        Ther Ex            Wall sits (HEP) 3x20\"            lunges             LP             Tall kneeling              Half kneeling              Hamstring ball curls   X10            Sidestepping / monster walks   GTB x 5 laps                         Ther Activity            TM             Elliptical              Patient education  KR KR                        FOTO             Modalities                                           "

## 2024-08-12 ENCOUNTER — OFFICE VISIT (OUTPATIENT)
Dept: PHYSICAL THERAPY | Facility: CLINIC | Age: 34
End: 2024-08-12
Payer: COMMERCIAL

## 2024-08-12 DIAGNOSIS — M54.50 CHRONIC LOW BACK PAIN WITHOUT SCIATICA, UNSPECIFIED BACK PAIN LATERALITY: ICD-10-CM

## 2024-08-12 DIAGNOSIS — G89.29 CHRONIC LOW BACK PAIN WITHOUT SCIATICA, UNSPECIFIED BACK PAIN LATERALITY: ICD-10-CM

## 2024-08-12 DIAGNOSIS — M25.551 RIGHT HIP PAIN: Primary | ICD-10-CM

## 2024-08-12 PROCEDURE — 97530 THERAPEUTIC ACTIVITIES: CPT

## 2024-08-12 PROCEDURE — 97110 THERAPEUTIC EXERCISES: CPT

## 2024-08-12 PROCEDURE — 97112 NEUROMUSCULAR REEDUCATION: CPT

## 2024-08-12 PROCEDURE — 97140 MANUAL THERAPY 1/> REGIONS: CPT

## 2024-08-12 NOTE — PROGRESS NOTES
"Daily Note     Today's date: 2024  Patient name: Sudheer Ortega  : 1990  MRN: [id number]  Referring provider: Sailaja Hennessy DO  Dx:   Encounter Diagnosis     ICD-10-CM    1. Right hip pain  M25.551       2. Chronic low back pain without sciatica, unspecified back pain laterality  M54.50     G89.29                        Subjective: \" I am feeling alright- I was not super sore after last time\"      Objective: See treatment diary below    Access Code: [email or web address]  URL: [email or web address]  Date: 2024  Prepared by: Kimberley Ruth    Exercises  - Supine Bridge  - 1 x daily - 7 x weekly - 2 sets - 10 reps - 5 hold  - Clamshell  - 1 x daily - 7 x weekly - 2 sets - 10 reps  - Quadruped Alternating Arm Lift  - 1 x daily - 7 x weekly - 2 sets - 10 reps  - Wall Sit  - 1 x daily - 7 x weekly - 5 reps - 10 hold  - Plank on Knees  - 1 x daily - 7 x weekly - 10 reps  - Hip Abduction with Resistance Loop  - 1 x daily - 7 x weekly - 3 sets - 10 reps  - Side Stepping with Resistance at Thighs  - 1 x daily - 7 x weekly - 10 reps      Assessment: Sudheer presents with R hip pain. Progressed core activation with exercises today emphasizing control and slowed activation to tolerance.  Note improved core isolation and reduced interdependent relationships of hips/ low back. Tolerated session without adverse effects. Recommend continued skilled therapy to improve overall strength and mobility for functional return with decreased compensation and pain.        Plan: Continue per plan of care.      Precautions: migraines, hypothyroid, lumbar spondylosis, iron deficiency anemia,       Manuals             Hip mobs flex/ abd   Grade III KR  GRADE IV KR          Cupping to ant thigh              IASTM              STM   Stick release KR  Stick release           Neuro Re-Ed           Bridges(HEP) 3\" x 15 5\" x 20 5\" x 20           Clamshells (HEP) 3\" x 15  3\" x 15 B  5\" x 20          Quad core " "act (HEP) W/ arm lift x 10   W/ arm lift x 10 alt and QL lift           Rows/ extensions   15#/ 10# 2 x 10            Standing core act  Pball 5\" x 20  Pball 5\" x 20           Hip abduction   GTB x 20 B           Mod plank    5x10\"           Ther Ex 8/1 8/8 8/12          Wall sits (HEP) 3x20\"            lunges             LP             Tall kneeling    Kneeling squats 7.5# x 20           Half kneeling    nv          Hamstring ball curls   X10  x15          Sidestepping / monster walks   GTB x 5 laps  GTB x 5 laps ea                        Ther Activity 8/1 8/8 8/12          TM             Elliptical              Patient education  KR KR KR                       FOTO             Modalities 8/1 8/8 8/12                                         "

## 2024-08-15 ENCOUNTER — OFFICE VISIT (OUTPATIENT)
Dept: PHYSICAL THERAPY | Facility: CLINIC | Age: 34
End: 2024-08-15
Payer: COMMERCIAL

## 2024-08-15 DIAGNOSIS — M54.50 CHRONIC LOW BACK PAIN WITHOUT SCIATICA, UNSPECIFIED BACK PAIN LATERALITY: ICD-10-CM

## 2024-08-15 DIAGNOSIS — M25.551 RIGHT HIP PAIN: Primary | ICD-10-CM

## 2024-08-15 DIAGNOSIS — G89.29 CHRONIC LOW BACK PAIN WITHOUT SCIATICA, UNSPECIFIED BACK PAIN LATERALITY: ICD-10-CM

## 2024-08-15 PROCEDURE — 97530 THERAPEUTIC ACTIVITIES: CPT

## 2024-08-15 PROCEDURE — 97112 NEUROMUSCULAR REEDUCATION: CPT

## 2024-08-15 PROCEDURE — 97140 MANUAL THERAPY 1/> REGIONS: CPT

## 2024-08-15 PROCEDURE — 97110 THERAPEUTIC EXERCISES: CPT

## 2024-08-15 NOTE — PROGRESS NOTES
"Daily Note     Today's date: 8/15/2024  Patient name: Sudheer Ortega  : 1990  MRN: [id number]  Referring provider: Sailaja Hennessy DO  Dx:   Encounter Diagnosis     ICD-10-CM    1. Right hip pain  M25.551       2. Chronic low back pain without sciatica, unspecified back pain laterality  M54.50     G89.29                          Subjective: \" I just had some snapping the other day\"       Objective: See treatment diary below    Access Code: [email or web address]  URL: [email or web address]  Date: 2024  Prepared by: Kimberley Ruth    Exercises  - Supine Bridge  - 1 x daily - 7 x weekly - 2 sets - 10 reps - 5 hold  - Clamshell  - 1 x daily - 7 x weekly - 2 sets - 10 reps  - Quadruped Alternating Arm Lift  - 1 x daily - 7 x weekly - 2 sets - 10 reps  - Wall Sit  - 1 x daily - 7 x weekly - 5 reps - 10 hold  - Plank on Knees  - 1 x daily - 7 x weekly - 10 reps  - Hip Abduction with Resistance Loop  - 1 x daily - 7 x weekly - 3 sets - 10 reps  - Side Stepping with Resistance at Thighs  - 1 x daily - 7 x weekly - 10 reps      Assessment: Sudheer presents with R hip pain. Progressed strengthening to tolerance with focus on separate hips/ lumbar relationship. Added hip MET to facilitate improved extension mobility and reduce impingement on ant hip. Note mild impingement with flexion and noted lumbar compensation . Hip flexor compensation noted with hip ER and glute act emphasized core an glute activation at home to reduce compensatory positioning.  Tolerated session without adverse effects. Recommend continued skilled therapy to improve overall strength and mobility for functional return with decreased compensation and pain.        Plan: Continue per plan of care.      Precautions: migraines, hypothyroid, lumbar spondylosis, iron deficiency anemia,       Manuals   8/1 8/8 8/12 8/15         Hip mobs flex/ abd   Grade III KR  GRADE IV KR GRADE IV KR          Cupping to ant thigh              IASTM              STM   " "Stick release KR  Stick release           Neuro Re-Ed 8/1 8/8 8/12 8/15         Bridges(HEP) 3\" x 15 5\" x 20 5\" x 20  15# kb off table x 20          Clamshells (HEP) 3\" x 15  3\" x 15 B  5\" x 20          MET     R hip ext/ L hip flex 5\" x 10          Quad core act (HEP) W/ arm lift x 10   W/ arm lift x 10 alt and QL lift           Bird dogs     X15 ea          Rows/ extensions   15#/ 10# 2 x 10            Standing core act  Pball 5\" x 20  Pball 5\" x 20           Hip abduction   GTB x 20 B           Mod plank    5x10\"           Ther Ex 8/1 8/8 8/12 8/15         Wall sits (HEP) 3x20\"            lunges    Split squat x 10 B          LP             Tall kneeling    Kneeling squats 7.5# x 20  Kneeling squats 15# x 20          Half kneeling    nv Airex RMB diagonal chops x15 B         Hamstring ball curls   X10  x15          DL     Double leg sumo and reg 15# x 20          Sidestepping / monster walks   GTB x 5 laps  GTB x 5 laps ea  GTB x 5 laps          RDL SL    5# x 20          Ther Activity 8/1 8/8 8/12 8/15         TM             Elliptical     3'          Patient education  KR KR KR KR                      FOTO             Modalities 8/1 8/8 8/12 8/15                                        "

## 2024-08-16 DIAGNOSIS — G43.009 MIGRAINE WITHOUT AURA AND WITHOUT STATUS MIGRAINOSUS, NOT INTRACTABLE: ICD-10-CM

## 2024-08-16 RX ORDER — RIZATRIPTAN BENZOATE 10 MG/1
TABLET, ORALLY DISINTEGRATING ORAL
Qty: 9 TABLET | Refills: 1 | Status: SHIPPED | OUTPATIENT
Start: 2024-08-16

## 2024-08-19 ENCOUNTER — APPOINTMENT (OUTPATIENT)
Dept: PHYSICAL THERAPY | Facility: CLINIC | Age: 34
End: 2024-08-19
Payer: COMMERCIAL

## 2024-08-22 ENCOUNTER — APPOINTMENT (OUTPATIENT)
Dept: PHYSICAL THERAPY | Facility: CLINIC | Age: 34
End: 2024-08-22
Payer: COMMERCIAL

## 2024-08-26 ENCOUNTER — APPOINTMENT (OUTPATIENT)
Dept: PHYSICAL THERAPY | Facility: CLINIC | Age: 34
End: 2024-08-26
Payer: COMMERCIAL

## 2024-08-29 ENCOUNTER — OFFICE VISIT (OUTPATIENT)
Dept: FAMILY MEDICINE CLINIC | Facility: CLINIC | Age: 34
End: 2024-08-29
Payer: COMMERCIAL

## 2024-08-29 ENCOUNTER — OFFICE VISIT (OUTPATIENT)
Dept: PHYSICAL THERAPY | Facility: CLINIC | Age: 34
End: 2024-08-29
Payer: COMMERCIAL

## 2024-08-29 VITALS
HEIGHT: 67 IN | SYSTOLIC BLOOD PRESSURE: 110 MMHG | WEIGHT: 203.2 LBS | BODY MASS INDEX: 31.89 KG/M2 | HEART RATE: 93 BPM | OXYGEN SATURATION: 99 % | TEMPERATURE: 97.7 F | DIASTOLIC BLOOD PRESSURE: 72 MMHG | RESPIRATION RATE: 16 BRPM

## 2024-08-29 VITALS — SYSTOLIC BLOOD PRESSURE: 130 MMHG | DIASTOLIC BLOOD PRESSURE: 80 MMHG

## 2024-08-29 DIAGNOSIS — M54.50 CHRONIC LOW BACK PAIN WITHOUT SCIATICA, UNSPECIFIED BACK PAIN LATERALITY: ICD-10-CM

## 2024-08-29 DIAGNOSIS — E66.9 CLASS 1 OBESITY WITHOUT SERIOUS COMORBIDITY WITH BODY MASS INDEX (BMI) OF 31.0 TO 31.9 IN ADULT, UNSPECIFIED OBESITY TYPE: ICD-10-CM

## 2024-08-29 DIAGNOSIS — M25.551 RIGHT HIP PAIN: Primary | ICD-10-CM

## 2024-08-29 DIAGNOSIS — G43.009 MIGRAINE WITHOUT AURA AND WITHOUT STATUS MIGRAINOSUS, NOT INTRACTABLE: ICD-10-CM

## 2024-08-29 DIAGNOSIS — S46.819D STRAIN OF TRAPEZIUS MUSCLE, UNSPECIFIED LATERALITY, SUBSEQUENT ENCOUNTER: ICD-10-CM

## 2024-08-29 DIAGNOSIS — S16.1XXA STRAIN OF NECK MUSCLE, INITIAL ENCOUNTER: ICD-10-CM

## 2024-08-29 DIAGNOSIS — H81.13 BPPV (BENIGN PAROXYSMAL POSITIONAL VERTIGO), BILATERAL: ICD-10-CM

## 2024-08-29 DIAGNOSIS — M54.2 NECK PAIN: Primary | ICD-10-CM

## 2024-08-29 DIAGNOSIS — M54.2 NECK PAIN: ICD-10-CM

## 2024-08-29 DIAGNOSIS — G89.29 CHRONIC LOW BACK PAIN WITHOUT SCIATICA, UNSPECIFIED BACK PAIN LATERALITY: ICD-10-CM

## 2024-08-29 DIAGNOSIS — S46.819A STRAIN OF TRAPEZIUS MUSCLE, UNSPECIFIED LATERALITY, INITIAL ENCOUNTER: ICD-10-CM

## 2024-08-29 DIAGNOSIS — G44.209 TENSION HEADACHE: ICD-10-CM

## 2024-08-29 DIAGNOSIS — R11.0 NAUSEA: ICD-10-CM

## 2024-08-29 DIAGNOSIS — S16.1XXD STRAIN OF NECK MUSCLE, SUBSEQUENT ENCOUNTER: ICD-10-CM

## 2024-08-29 PROCEDURE — 99214 OFFICE O/P EST MOD 30 MIN: CPT | Performed by: FAMILY MEDICINE

## 2024-08-29 PROCEDURE — 97530 THERAPEUTIC ACTIVITIES: CPT

## 2024-08-29 PROCEDURE — 97110 THERAPEUTIC EXERCISES: CPT

## 2024-08-29 PROCEDURE — 97140 MANUAL THERAPY 1/> REGIONS: CPT

## 2024-08-29 PROCEDURE — 97112 NEUROMUSCULAR REEDUCATION: CPT

## 2024-08-29 PROCEDURE — 97164 PT RE-EVAL EST PLAN CARE: CPT

## 2024-08-29 RX ORDER — DEXAMETHASONE 4 MG/1
4 TABLET ORAL 2 TIMES DAILY WITH MEALS
Qty: 8 TABLET | Refills: 0 | Status: SHIPPED | OUTPATIENT
Start: 2024-08-29 | End: 2024-09-02

## 2024-08-29 RX ORDER — MECLIZINE HYDROCHLORIDE 25 MG/1
12.5-25 TABLET ORAL EVERY 8 HOURS PRN
Qty: 30 TABLET | Refills: 0 | Status: SHIPPED | OUTPATIENT
Start: 2024-08-29 | End: 2024-09-08

## 2024-08-29 RX ORDER — ONDANSETRON 8 MG/1
8 TABLET, ORALLY DISINTEGRATING ORAL EVERY 8 HOURS PRN
Qty: 30 TABLET | Refills: 0 | Status: SHIPPED | OUTPATIENT
Start: 2024-08-29 | End: 2024-09-08

## 2024-08-29 NOTE — TELEPHONE ENCOUNTER
Additional Information  • Negative: Is this related to a work injury?  • Negative: Is this related to an MVA?  • Negative: Are you currently recieving homecare services?  • Negative: Has the patient had unexplained weight loss?  • Negative: Does the patient have a fever?  • Negative: Is the patient experiencing urine retention?  • Negative: Is the patient experiencing acute drop foot or paralysis?  • Negative: Has the patient experienced major trauma? (fall from height, high speed collision, direct blow to spine) and is also experiencing nausea, light-headedness, or loss of consciousness?  • Negative: Is the patient experiencing blood in sputum?  • Negative: Is this a chronic condition?    Protocols used: [facility] Protocol

## 2024-08-29 NOTE — PROGRESS NOTES
Assessment/Plan:  Problem List Items Addressed This Visit          Cardiovascular and Mediastinum    Migraines     Stable on Maxalt MLT 10mg PRN and Compazine 10mg PRN.      For headache and migraine prevention I would suggest a combination vitamin supplement which may include 1 or more of the following ingredients:  Magnesium 400 mg , Riboflavin (vitamin B2) 400 - 600 mg,  Butterbur 150 mg (PA free). Other ingredients that may be helpful are feverfew, coenzyme Q10 100 mg three times a day,  melatonin and christina.  Some example brands that combine some of these ingredients are Migravent or Dolovent.          Relevant Medications    dexamethasone (DECADRON) 4 mg tablet    ondansetron (ZOFRAN-ODT) 8 mg disintegrating tablet    Other Relevant Orders    Ambulatory referral to [facility]       Other    Class 1 obesity without serious comorbidity with body mass index (BMI) of 31.0 to 31.9 in adult     Recommend lifestyle modifications.  Patient previously declined weight management consult.  To consider GLP1-agonist?            Other Visit Diagnoses       Neck pain    -  Primary    Relevant Medications    dexamethasone (DECADRON) 4 mg tablet    Other Relevant Orders    Ambulatory referral to [facility]    Due to cervical strain.  Pending [facility] consult.  Start Decadron 4mg 1 pill BID x 4 days.  Advise Motrin / Tylenol PRN.        Strain of trapezius muscle, unspecified laterality, initial encounter        Relevant Medications    dexamethasone (DECADRON) 4 mg tablet    Other Relevant Orders    Ambulatory referral to [facility]    See above.        Tension headache        Relevant Medications    dexamethasone (DECADRON) 4 mg tablet    Other Relevant Orders    Ambulatory referral to [facility]    See above.        BPPV (benign paroxysmal positional vertigo), bilateral        Relevant Medications    meclizine (ANTIVERT) 25 mg tablet    Other Relevant Orders    Ambulatory  "Referral to Physical Therapy    Pending Vestibular Therapy consult.  Start Meclizine 25mg 1/2-1 tab q8 hours PRN - side effects discussed.        Nausea        Relevant Medications    ondansetron (ZOFRAN-ODT) 8 mg disintegrating tablet    Start Zofran ODT 8mg q8 hours PRN - side effects discussed.  Do not mix c Compazine.        Strain of neck muscle, initial encounter        Relevant Medications    dexamethasone (DECADRON) 4 mg tablet    Other Relevant Orders    Ambulatory referral to [facility]    See above.                   Return in about 6 weeks (around 10/10/2024) for Physical / 6mo - Hypothyroid, Migraines, Myalgias, Labs.      Future Appointments   Date Time Provider Department Center   9/11/2024 11:00 AM Kimberley Ruth PT [facility] [place]   9/14/2024  8:00 AM Sailaja Hennessy DO [facility]   9/16/2024  5:30 PM Kimberley Ruth PT [facility] [place]   9/19/2024  5:30 PM Kimberley Ruth PT [facility] [place]   9/23/2024  5:30 PM Kimberley Ruth PT [facility] [place]   9/26/2024  4:30 PM Kimberley Ruth PT [facility] [place]   10/29/2024  6:20 PM Mag Campbell DO [facility]        Subjective:     Sudheer is a 33 y.o. female who presents today for a follow-up on her acute medical conditions.        HPI:  Chief Complaint   Patient presents with   • Neck Pain     Pt reports \"severe\" neck pain for the last three weeks. Experiencing vertigo, intense headaches, and decreased ROM. Pain is constant aching with occasional \"fast, electric\" pains with certain movements. Denies numbness/tingling in hands/fingers. Has tried tylenol, ice, heat, and muscle relaxants that she has for her low back, with little to no relief. Caused a migraine at one point and pt had headache and vomiting for one day.      -- Above per clinical staff and reviewed. --    Back Pain  This is a new problem. The current episode started 1 to 4 weeks ago. The problem occurs constantly. The problem " "has been waxing and waning since onset. The pain is present in the thoracic spine. The quality of the pain is described as aching, shooting and stabbing. The pain is at a severity of 9/10. The pain is Worse during the night. The symptoms are aggravated by position, lying down, sitting, standing and twisting. Stiffness is present In the morning and all day. Associated symptoms include headaches. Pertinent negatives include no abdominal pain, bladder incontinence, bowel incontinence, chest pain, dysuria, fever, leg pain, numbness, paresis, paresthesias, pelvic pain, perianal numbness, tingling, weakness or weight loss.         Today:      Neck Pain - Symptoms x 3 weeks, pain severity is intermittent, decreased AROM c neck rotation.  Symptoms improve throughout the day, but then awakes with worsening pain.  Has mild HA chronically throughout the day.  HA and neck pain are worse with activity.  Feels lightheaded intermittently with severity of headache.  B/L paracervical neck and left trapezius .  HA pain moves superiorly to neck, B/L temporal and B/L frontal \"halo\" distribution.  Neck pain is dull, achy at baseline, sharp, shooting pain with movement.  Currently 3-4/10, Max 10/10.  Sometimes pain is so severe that it causes vomiting.  Using Tylenol XS 500mg 2 pills TID c minimal relief for neck and HA, ice and heat c benefit, Skelaxin 800mg QHS PRN x 4-5 times s benefit.  Denies neck trauma.            The following portions of the patient's history were reviewed and updated as appropriate: allergies, current medications, past family history, past medical history, past social history, past surgical history and problem list.      Review of Systems   Constitutional:  Positive for fatigue (Due to neck pain and HA). Negative for appetite change, chills, diaphoresis, fever and weight loss.   Respiratory:  Negative for chest tightness and shortness of breath.    Cardiovascular:  Negative for chest pain.   Gastrointestinal:  " "Positive for nausea (due to pain) and vomiting (x 1 due to pain). Negative for abdominal pain, blood in stool, bowel incontinence and diarrhea.   Genitourinary:  Negative for bladder incontinence, dysuria and pelvic pain.   Musculoskeletal:  Positive for back pain, myalgias and neck pain.   Neurological:  Positive for headaches. Negative for tingling, weakness, numbness and paresthesias.        Current Outpatient Medications   Medication Sig Dispense Refill   • dexamethasone (DECADRON) 4 mg tablet Take 1 tablet (4 mg total) by mouth 2 (two) times a day with meals for 4 days 8 tablet 0   • levothyroxine 50 mcg tablet TAKE 1 TABLET BY MOUTH DAILY ON AN EMPTY STOMACH 30 MINUTES PRIOR TO EATING FOOD OR DRINK 90 tablet 1   • meclizine (ANTIVERT) 25 mg tablet Take 0.5-1 tablets (12.5-25 mg total) by mouth every 8 (eight) hours as needed for dizziness for up to 10 days 30 tablet 0   • metaxalone (SKELAXIN) 800 mg tablet Take 1 tablet (800 mg total) by mouth daily at bedtime as needed for muscle spasms 30 tablet 0   • Multiple Vitamin (MULTIVITAMINS PO) Take 1 tablet by mouth daily      • Norethin-Eth Estrad-Fe Biphas 1 MG-10 MCG / 10 MCG TABS Take 1 tablet by mouth daily Rx per Gyn     • ondansetron (ZOFRAN-ODT) 8 mg disintegrating tablet Take 1 tablet (8 mg total) by mouth every 8 (eight) hours as needed for nausea (Migraine) for up to 10 days 30 tablet 0   • prochlorperazine (COMPAZINE) 10 mg tablet Take 1 tablet (10 mg total) by mouth every 8 (eight) hours as needed for nausea or vomiting (Migraine) 30 tablet 0   • rizatriptan (MAXALT-MLT) 10 mg disintegrating tablet Take at the onset of migraine; if symptoms continue or return, may take another dose at least 2 hours after first dose. Take no more than 2 doses in a day. 9 tablet 1     No current facility-administered medications for this visit.       Objective:  /72   Pulse 93   Temp 97.7 °F (36.5 °C)   Resp 16   Ht 5' 7\" (1.702 m)   Wt 92.2 kg (203 lb 3.2 " oz)   SpO2 99%   BMI 31.83 kg/m²    Wt Readings from Last 3 Encounters:   08/29/24 92.2 kg (203 lb 3.2 oz)   07/19/24 92.5 kg (204 lb)   05/21/24 92.9 kg (204 lb 12.8 oz)      BP Readings from Last 3 Encounters:   08/29/24 130/80   08/29/24 110/72   08/01/24 140/86          Physical Exam  Vitals and nursing note reviewed.   Constitutional:       Appearance: Normal appearance. She is well-developed. She is obese.   HENT:      Head: Normocephalic and atraumatic.      Right Ear: Tympanic membrane, ear canal and external ear normal.      Left Ear: Tympanic membrane, ear canal and external ear normal.      Nose: Nose normal.      Right Sinus: No maxillary sinus tenderness or frontal sinus tenderness.      Left Sinus: No maxillary sinus tenderness or frontal sinus tenderness.      Mouth/Throat:      Mouth: Mucous membranes are moist.      Pharynx: Oropharynx is clear. Uvula midline.      Tonsils: No tonsillar exudate.   Eyes:      Extraocular Movements: Extraocular movements intact.      Conjunctiva/sclera: Conjunctivae normal.      Pupils: Pupils are equal, round, and reactive to light.   Cardiovascular:      Rate and Rhythm: Normal rate and regular rhythm.      Pulses: Normal pulses.      Heart sounds: Normal heart sounds.   Pulmonary:      Effort: Pulmonary effort is normal.      Breath sounds: Normal breath sounds.   Musculoskeletal:         General: Tenderness present. No swelling.      Cervical back: Neck supple. Tenderness (Occipult, Right Superior Trapezius Insertion) present. No edema, erythema, rigidity or crepitus. Pain with movement and muscular tenderness (Right Superior Trapezius Insertion) present. No spinous process tenderness. Decreased range of motion (In all planes except flexion).      Right lower leg: No edema.      Left lower leg: No edema.   Lymphadenopathy:      Cervical: No cervical adenopathy.   Skin:     Findings: No rash.   Neurological:      General: No focal deficit present.      Mental  "Status: She is alert and oriented to person, place, and time. Mental status is at baseline.      Sensory: No sensory deficit.      Motor: No weakness.      Coordination: Coordination normal.      Deep Tendon Reflexes: Reflexes normal.      Comments: +Demetrice -Hallpike B/L - L > R   Psychiatric:         Behavior: Behavior normal.         Thought Content: Thought content normal.         Judgment: Judgment normal.         Lab Results:      Lab Results   Component Value Date    WBC 7.35 03/05/2022    HGB 14.2 03/05/2022    HCT 43.8 03/05/2022     03/05/2022    TRIG 67 12/03/2023    HDL 69 12/03/2023    LDLDIRECT 127 (H) 12/03/2023    ALT 14 12/03/2023    AST 16 12/03/2023     07/01/2014    K 4.7 12/03/2023     12/03/2023    CREATININE 0.99 12/03/2023    BUN 15 12/03/2023    CO2 26 12/03/2023    GLUF 87 12/03/2023    HGBA1C 4.8 07/22/2019     Lab Results   Component Value Date    URICACID 4.0 10/22/2020     Invalid input(s): \"BASENAME\" Vitamin D    No results found.     POCT Labs                       "

## 2024-08-29 NOTE — PROGRESS NOTES
PT Evaluation     Today's date: 2024  Patient name: Sudheer Ortega  : 1990  MRN: [id number]  Referring provider: Sailaja Hennessy DO  Dx:   Encounter Diagnosis     ICD-10-CM    1. Right hip pain  M25.551       2. Chronic low back pain without sciatica, unspecified back pain laterality  M54.50     G89.29       3. Strain of trapezius muscle, unspecified laterality, subsequent encounter  S46.819D       4. Strain of neck muscle, subsequent encounter  S16.1XXD           Start Time: 1600          Assessment  Impairments: abnormal or restricted ROM, activity intolerance, impaired balance, impaired physical strength, pain with function and poor posture   Other impairment: stability defiicts,  neck pain, cervigogenic restricion, dizziness  Symptom irritability: moderate    Assessment details: Sudheer Ortega  is a pleasant 33 y.o. female  who presents with R hip pain and LBP . Presents today for addition of cervical pain and dizziness. Vestibular testing was negative for vertigo and vestibular hypofunction. Note increased cervical pain/ cervicogenic dysfunction and decreased upper cervical mobility as well as increased soft tissue restriction in B UT and SOR with replication of presenting HA. Provided HEP focused on cervical mobility and posture- educated on maintenance of posture throughout.  Tolerated session without adverse effects. Recommend continued skilled therapy to improve overall strength and mobility for functional return with decreased compensation and pain.      Access Code: [email or web address]  URL: [email or web address]  Date: 2024  Prepared by: Kimberley Ruth    Exercises  - Supine Bridge  - 1 x daily - 7 x weekly - 2 sets - 10 reps - 5 hold  - Clamshell  - 1 x daily - 7 x weekly - 2 sets - 10 reps  - Quadruped Alternating Arm Lift  - 1 x daily - 7 x weekly - 2 sets - 10 reps  - Wall Sit  - 1 x daily - 7 x weekly - 5 reps - 10 hold  - Plank on Knees  - 1 x daily - 7 x weekly - 10 reps  -  Hip Abduction with Resistance Loop  - 1 x daily - 7 x weekly - 3 sets - 10 reps  - Side Stepping with Resistance at Thighs  - 1 x daily - 7 x weekly - 10 reps  - Seated Gentle Upper Trapezius Stretch  - 1 x daily - 7 x weekly - 3 reps - 20 hold  - Seated Cervical Retraction  - 1 x daily - 7 x weekly - 10 reps - 5 hold  - Sternocleidomastoid Stretch  - 1 x daily - 7 x weekly - 3 reps - 20 hold  - Seated Assisted Cervical Rotation with Towel  - 1 x daily - 7 x weekly - 10 reps - 10 hold  Understanding of Dx/Px/POC: good     Prognosis: good    Goals  Impairment Goals  - Pt I with initial HEP in 1-2 visits  - Improve hip ROM to WNL without increased pain or compensation in 4-6 weeks   - Increase strength to 5/5 in all affected areas in 4-6 weeks  - Patient will improve cervical ROM to WNL without increased pain in 4 weeks  - Patient will report no dizziness by d/c     Functional Goals  - Increase Functional Status Measure to: MDC in 6-8 weeks  - Patient will be independent with comprehensive HEP in 6-8 weeks  - Patient will return to gym activity without increased compensation or pain in 6-8 weeks   - Stair climbing is improved to prior level of function in 6-8 weeks  - Squatting is improved to prior level of function in 6-8 weeks     Plan  Patient would benefit from: PT eval and skilled physical therapy  Referral necessary: No  Planned modality interventions: thermotherapy: hydrocollator packs, TENS and cryotherapy    Planned therapy interventions: IASTM, joint mobilization, kinesiology taping, manual therapy, neuromuscular re-education, patient/caregiver education, postural training, strengthening, stretching, therapeutic activities, therapeutic exercise and home exercise program    Frequency: 2x week  Duration in weeks: 8  Treatment plan discussed with: patient  Plan details: 8 visits as per start back level 2       Subjective Evaluation    History of Present Illness  Mechanism of injury: 8/29/24: Sudheer presents for  "RE addition of cervical pain and dizziness onset approx 3 weeks ago. Notes waking up with HA and dizziness and no RITCHIE. Notes dizziness with fast standing. Reports nausea with dizziness/ periodic vomiting. Notes pain in the post neck/ base of skull radiating into the traps B.  Neck current 4/10 worse 10/10 sharp pain and dull achiness   Aggravating factors: sleeping every position and turning head       Sudheer Ortega presents with c/c of R hip pain, pinching and weakness. Symptoms began 2-3 months ago  with no known mechanism of injury. Notes periodic LBP into SI denied radicular pain. Reports hypermobility throughout all joints and previous LBP and hip pain. Weakness in the R LE intermittent not connected to exercise or rest. Reports clicking and snapping. Reports it feels unstable. Hx of hip dislocation a couple of years ago.   Aggravating factors: Pain with moving inward, walking running and sleeping back: pain with standing prolonged and stomach lying   Relieving factors: not really  24hr pain pattern: 2 /10 (current), 2/10 (best), 7/10 back 8/10  (worst), location:ant hip/ low back , descriptors: hip: pinching/ pulling back: dull achy   Imaging: Narrative & Impression        RIGHT HIP     INDICATION:   Pain in right hip.      COMPARISON:  None.     VIEWS:  XR HIP/PELV 2-3 VWS RIGHT W PELVIS IF PERFORMED      FINDINGS:     There is no acute fracture or dislocation.     No significant hip degenerative changes.     No lytic or blastic osseous lesion.     Soft tissues are unremarkable.     Degenerative changes visualized lower lumbar spine.      Previous treatments: not for the hip, LB: nerve ablation and cortisone injections - PT   Occupation/recreation: frequent traveling   Patient goals: \" To just not have that pain- to learn how to not trigger it and prevent it from coming back\"   Currently training for half marathon           Recurrent probem    Quality of life: good          Objective     Concurrent " Complaints  Positive for headaches. Negative for bladder dysfunction, bowel dysfunction and saddle (S4) numbness    Observations     Right Hip  Positive for atrophy.     Palpation     Additional Palpation Details  TTP to B paraspinals with palpable spasming noted throughout     TTP in the ant hip flexor attachment at the groin   No distal quad tenderness  No illiopsoas tenderness noted on R   No palpable protrusion (hernia)       Lumbar Screen  Lumbar range of motion within normal limits with the following exceptions:Hyper mobility with flexion can palm floor   Hyperextension of knees in standing   B paraspinal and QL restriction with SB ROM WNL     Neurological Testing     Sensation     Hip   Left Hip   Intact: light touch    Right Hip   Intact: light touch    Comments   Left light touch: L2-S1.   Right light touch: L2-S1.     Reflexes   Left   Patellar (L4): normal (2+)  Achilles (S1): absent (0)    Right   Patellar (L4): normal (2+)  Achilles (S1): normal (2+)    Active Range of Motion   Cervical/Thoracic Spine       Cervical    Flexion: Neck active flexion: 25% loss pulling in B traps and lats.   Extension: Neck active extension: WNL no sx.      Left lateral flexion: Neck active lateral bend left: 50% loss base of skull.      Right lateral flexion: Neck active lateral bend right: 50% loss dizziness and pulling on the L.      Left rotation: Neck active rotation left: 25% loss pulling in left and into thoracic.  Right rotation: Neck active rotation right: 25% loss pulling in base of skull.       Right Hip   External rotation (90/90): 50 degrees with pain  Internal rotation (90/90): 40 degrees with pain    Joint Play     Hypermobile: L3, L4 and L5     Hypomobile: C2, C3, C4, C5, C6 and C7     Pain: C2, C5, L3, L4 and L5     Strength/Myotome Testing     Lumbar   Left   Heel walk: normal  Toe walk: normal    Right   Heel walk: normal  Toe walk: normal    Left Hip   Planes of Motion   Flexion: 4+  Extension:  4  Abduction: 4+  Adduction: 4+  External rotation: 4  Internal rotation: 4    Right Hip   Planes of Motion   Flexion: 4  Extension: 3+  Abduction: 4  Adduction: 4+  External rotation: 4-  Internal rotation: 4    Left Knee   Flexion: 4  Extension: 4  Quadriceps contraction: fair    Right Knee   Flexion: 4  Extension: 4  Quadriceps contraction: fair    Additional Strength Details  Heel walking WNL   Hyperextension limits activation     Tests     Lumbar     Right   Positive slump test.     Right Hip   Positive FADIR and scour.   Negative MAC.   Henrique: Negative.    Modified Henrique: Negative.   SLR: Positive.     General Comments:      Hip Comments   Squat: L sided WB increased pain in ANT HIP  SLS: R: 15 sec mod sway  L: mod sway- 25 sec   Neuro Exam:     Dizziness  Positive for disequilibrium, vertigo, oscillopsia, motion sickness, light-headedness, rocking or swaying and floating or swimming.   Negative for diplopia.     Exacerbating factors  Positive for walking, turning head and supine to/from sitting.   Negative for bending over, rolling in bed, looking up, optokinetic movement and walking in busy environment.     Symptoms   Duration: up to a couple of hours, 30 sec  Frequency: couple times a day  Intensity at best: 0/10  Intensity at worst: 6/10  Average intensity: 3/10    Headaches   Patient reports headaches: Yes.   Frequency: daily  Duration: multiple hours - hx of migraines  Intensity at best: 2/10  Intensity at worst: 10/10  Average intensity: 6/10  Location: base of skull radiating to the forehead  Exacerbating factors: activity - moderate to heavy screens  Relieving factors: rescue migraine meds    Cervical exam   Modified VBI   Left: asymptomatic  Right: asymptomatic  Cervical palpation: ttP B UT and levator, no dizziness noted, SOR restrictions R > L mild HA elicited and mild dizziness  Seated posture: forward head posture and internally rotated shoulders    Oculomotor exam   Oculomotor ROM: WNL and  "saccadiic with movement to the right  Resting nystagmus: not present   Gaze holding nystagmus: not present left  and not present right  Smooth pursuits: saccadic smooth pursuit  Vertical saccades: normal  Horizontal saccades: normal  Convergence: insufficiency  Convergence: abnormal  Dynamic head: VOR x 1 horizontal / vertical no nystagmus mild neck pain, no nausea  Static head: VOR x 2 mild dizziness no nystagmus    Positional testing   Demetrice-Hallpike   Left posterior canal: WNL  Right posterior canal: WNL  [place]-Hallpike comment: dizziness with return to sit  Roll test   Left horizontal canal: WNL  Right horizontal canal: WNL  Positional testing comment: Denies dysphagia, drop attacks, diplopia minus (HA), numbness nystagmus                Precautions: migraines, hypothyroid, lumbar spondylosis, iron deficiency anemia,       Manuals   8/1 8/8 8/12 8/15 8/29        Hip mobs flex/ abd   Grade III KR  GRADE IV KR GRADE IV KR          Cupping to ant thigh              SOR/ cervicalrelease      KR        IASTM              STM   Stick release KR  Stick release           Neuro Re-Ed 8/1 8/8 8/12 8/15 8/29        Bridges(HEP) 3\" x 15 5\" x 20 5\" x 20  15# kb off table x 20          Clamshells (HEP) 3\" x 15  3\" x 15 B  5\" x 20          MET     R hip ext/ L hip flex 5\" x 10          Quad core act (HEP) W/ arm lift x 10   W/ arm lift x 10 alt and QL lift           Bird dogs     X15 ea          Rows/ extensions   15#/ 10# 2 x 10            Standing core act  Pball 5\" x 20  Pball 5\" x 20           Chin tucks      5\" x 10         Hip abduction   GTB x 20 B   GTB x 20 hip ext x 20         SCM     3x20\"        Mod plank    5x10\"           UT and levator stretch      3x20\"         SNAGS     10x10\"         Ther Ex 8/1 8/8 8/12 8/15 8/29        Wall sits (HEP) 3x20\"            lunges    Split squat x 10 B          LP             Tall kneeling    Kneeling squats 7.5# x 20  Kneeling squats 15# x 20  Blue ball x 15         Half kneeling    " nv Airex RMB diagonal chops x15 B Airex blue MB lifts x 20         Hamstring ball curls   X10  x15          DL     Double leg sumo and reg 15# x 20          Sidestepping / monster walks   GTB x 5 laps  GTB x 5 laps ea  GTB x 5 laps          RDL SL    5# x 20  5# x 20         Ther Activity 8/1 8/8 8/12 8/15 8/29        TM             Elliptical     3'  5'         Patient education  KR KR KR KR KR                     FOTO     X        Modalities 8/1 8/8 8/12 8/15 8/29

## 2024-08-29 NOTE — PROGRESS NOTES
"Daily Note     Today's date: 2024  Patient name: Sudheer Ortega  : 1990  MRN: [id number]  Referring provider: Sailaja Hennessy DO  Dx:   No diagnosis found.                     Subjective: \" I      Objective: See treatment diary below    Access Code: [email or web address]  URL: [email or web address]  Date: 2024  Prepared by: Kimberley Ruth    Exercises  - Supine Bridge  - 1 x daily - 7 x weekly - 2 sets - 10 reps - 5 hold  - Clamshell  - 1 x daily - 7 x weekly - 2 sets - 10 reps  - Quadruped Alternating Arm Lift  - 1 x daily - 7 x weekly - 2 sets - 10 reps  - Wall Sit  - 1 x daily - 7 x weekly - 5 reps - 10 hold  - Plank on Knees  - 1 x daily - 7 x weekly - 10 reps  - Hip Abduction with Resistance Loop  - 1 x daily - 7 x weekly - 3 sets - 10 reps  - Side Stepping with Resistance at Thighs  - 1 x daily - 7 x weekly - 10 reps      Assessment: Sudheer presents with R hip pain.  Tolerated session without adverse effects. Recommend continued skilled therapy to improve overall strength and mobility for functional return with decreased compensation and pain.        Plan: Continue per plan of care.      Precautions: migraines, hypothyroid, lumbar spondylosis, iron deficiency anemia,       Manuals   8/1 8/8 8/12 8/15 8/29        Hip mobs flex/ abd   Grade III KR  GRADE IV KR GRADE IV KR          Cupping to ant thigh              IASTM              STM   Stick release KR  Stick release           Neuro Re-Ed 8/1 8/8 8/12 8/15 8/29        Bridges(HEP) 3\" x 15 5\" x 20 5\" x 20  15# kb off table x 20          Clamshells (HEP) 3\" x 15  3\" x 15 B  5\" x 20          MET     R hip ext/ L hip flex 5\" x 10          Quad core act (HEP) W/ arm lift x 10   W/ arm lift x 10 alt and QL lift           Bird dogs     X15 ea          Rows/ extensions   15#/ 10# 2 x 10            Standing core act  Pball 5\" x 20  Pball 5\" x 20           Hip abduction   GTB x 20 B           Mod plank    5x10\"           Ther Ex 8/1 8/8 8/12 8/15 8/29 " "       Wall sits (HEP) 3x20\"            lunges    Split squat x 10 B          LP             Tall kneeling    Kneeling squats 7.5# x 20  Kneeling squats 15# x 20          Half kneeling    nv Airex RMB diagonal chops x15 B         Hamstring ball curls   X10  x15          DL     Double leg sumo and reg 15# x 20          Sidestepping / monster walks   GTB x 5 laps  GTB x 5 laps ea  GTB x 5 laps          RDL SL    5# x 20          Ther Activity 8/1 8/8 8/12 8/15 8/29        TM             Elliptical     3'          Patient education  KR KR KR KR                      FOTO             Modalities 8/1 8/8 8/12 8/15 8/29                                       "

## 2024-08-30 NOTE — ASSESSMENT & PLAN NOTE
Recommend lifestyle modifications.  Patient previously declined weight management consult.  To consider GLP1-agonist?

## 2024-09-11 ENCOUNTER — OFFICE VISIT (OUTPATIENT)
Dept: PHYSICAL THERAPY | Facility: CLINIC | Age: 34
End: 2024-09-11
Payer: COMMERCIAL

## 2024-09-11 DIAGNOSIS — S46.819D STRAIN OF TRAPEZIUS MUSCLE, UNSPECIFIED LATERALITY, SUBSEQUENT ENCOUNTER: ICD-10-CM

## 2024-09-11 DIAGNOSIS — S16.1XXD STRAIN OF NECK MUSCLE, SUBSEQUENT ENCOUNTER: ICD-10-CM

## 2024-09-11 DIAGNOSIS — M25.551 RIGHT HIP PAIN: Primary | ICD-10-CM

## 2024-09-11 DIAGNOSIS — M54.2 NECK PAIN: ICD-10-CM

## 2024-09-11 DIAGNOSIS — G89.29 CHRONIC LOW BACK PAIN WITHOUT SCIATICA, UNSPECIFIED BACK PAIN LATERALITY: ICD-10-CM

## 2024-09-11 DIAGNOSIS — M54.50 CHRONIC LOW BACK PAIN WITHOUT SCIATICA, UNSPECIFIED BACK PAIN LATERALITY: ICD-10-CM

## 2024-09-11 PROCEDURE — 97112 NEUROMUSCULAR REEDUCATION: CPT

## 2024-09-11 PROCEDURE — 97140 MANUAL THERAPY 1/> REGIONS: CPT

## 2024-09-11 PROCEDURE — 97530 THERAPEUTIC ACTIVITIES: CPT

## 2024-09-11 PROCEDURE — 97110 THERAPEUTIC EXERCISES: CPT

## 2024-09-11 NOTE — PROGRESS NOTES
"Daily Note     Today's date: 2024  Patient name: Sudheer Ortega  : 1990  MRN: [id number]  Referring provider: Sailaja Hennessy DO  Dx:   Encounter Diagnosis     ICD-10-CM    1. Right hip pain  M25.551       2. Chronic low back pain without sciatica, unspecified back pain laterality  M54.50     G89.29       3. Strain of trapezius muscle, unspecified laterality, subsequent encounter  S46.819D       4. Strain of neck muscle, subsequent encounter  S16.1XXD       5. Neck pain  M54.2                      Subjective: \" I still have the tension headache I did do a lot of stretching and got a massage and it was better- when I ran today my hip felt like it was gonna snap\"       Objective: See treatment diary below      Assessment: Sudheer presents with hip pain/ neck pain. Note continued R > L sided sub occipital restrictions causing increased pain. Mild relief post release. Performed running assessment noting increased r knee hyperextension and hip flexor compensation likely contributing to increased hip pain. Tolerated session without adverse effects. Recommend continued skilled therapy to improve overall strength and mobility for functional return with decreased compensation and pain.        Plan: Continue per plan of care.      Precautions: migraines, hypothyroid, lumbar spondylosis, iron deficiency anemia,       Manuals   8/1 8/8 8/12 8/15 8/29 9/11       Hip mobs flex/ abd   Grade III KR  GRADE IV KR GRADE IV KR          Cupping to ant thigh              SOR/ cervicalrelease      KR KR       IASTM              STM   Stick release KR  Stick release           Neuro Re-Ed 8/1 8/8 8/12 8/15 8/29 9/11       Bridges(HEP) 3\" x 15 5\" x 20 5\" x 20  15# kb off table x 20          Clamshells (HEP) 3\" x 15  3\" x 15 B  5\" x 20          MET     R hip ext/ L hip flex 5\" x 10          Quad core act (HEP) W/ arm lift x 10   W/ arm lift x 10 alt and QL lift           Bird dogs     X15 ea          Rows/ extensions   15#/ 10# 2 x " "10            No moneys       GTB x 20        Standing core act  Pball 5\" x 20  Pball 5\" x 20           Supine foam roll protocol       X15 ea        Chin tucks      5\" x 10  5\" x 10 supine        Hip abduction   GTB x 20 B   GTB x 20 hip ext x 20         SCM     3x20\"        Mod plank    5x10\"           UT and levator stretch      3x20\"  3x20\"        SNAGS     10x10\"  5x10\"        Ther Ex 8/1 8/8 8/12 8/15 8/29 9/11       Wall sits (HEP) 3x20\"            lunges    Split squat x 10 B   Curtsy lunge x 15 B 7#       LP      155# x 30 105# SL x 30        Goblet squats       7# x 20        Tall kneeling    Kneeling squats 7.5# x 20  Kneeling squats 15# x 20  Blue ball x 15         Half kneeling    nv Airex RMB diagonal chops x15 B Airex blue MB lifts x 20         Hamstring ball curls   X10  x15          DL     Double leg sumo and reg 15# x 20          Sidestepping / monster walks   GTB x 5 laps  GTB x 5 laps ea  GTB x 5 laps          RDL SL    5# x 20  5# x 20         Ther Activity 8/1 8/8 8/12 8/15 8/29 9/11       TM             Running assessment       KR       Jacquie     3'  5'         Patient education  KR KR KR KR KR KR                    FOTO     X        Modalities 8/1 8/8 8/12 8/15 8/29 9/11                                        "

## 2024-09-16 ENCOUNTER — APPOINTMENT (OUTPATIENT)
Dept: PHYSICAL THERAPY | Facility: CLINIC | Age: 34
End: 2024-09-16
Payer: COMMERCIAL

## 2024-09-19 ENCOUNTER — APPOINTMENT (OUTPATIENT)
Dept: PHYSICAL THERAPY | Facility: CLINIC | Age: 34
End: 2024-09-19
Payer: COMMERCIAL

## 2024-09-20 ENCOUNTER — AMB VIDEO VISIT (OUTPATIENT)
Dept: OTHER | Facility: HOSPITAL | Age: 34
End: 2024-09-20

## 2024-09-20 PROCEDURE — ECARE PR SL URGENT CARE VIRTUAL VISIT: Performed by: FAMILY MEDICINE

## 2024-09-20 NOTE — CARE ANYWHERE EVISITS
Visit Summary for GERMAN DIEZ - Gender: Female - Date of Birth: 1990  Date: [id number] - Duration: 4 minutes  Patient: GERMAN DIEZ  Provider: Betty Martinez    Patient Contact Information  Address  [address] [address]  [phone]    Visit Topics  UTI symptoms  [Added By: Self - 2024-09-20]    Triage Questions   What is your current physical address in the event of a medical emergency? Answer []  Are you allergic to any medications? Answer []  Are you now or could you be pregnant? Answer []  Do you have any immune system compromise or chronic lung   disease? Answer []  Do you have any vulnerable family members in the home (infant, pregnant, cancer, elderly)? Answer []     Conversation Transcripts  [0A][0A] [Notification] You are connected with Betty Martinez, Family Physician.[0A][Notification] GERMAN DIEZ is located in [place].[0A][Notification] GERMAN DIEZ has shared health history...[0A]    Diagnosis  Acute cystitis without hematuria    Procedures  Value: [id number] Code: CPT-4 UNLISTED E&M SERVICE    Medications Prescribed    Macrobid  Dose : 1 capsule  Route : oral  Frequency : 2 times a day. Until directed to stop.   Patient Instructions : 1 PO BID X 5 DAYS  Refills : 0  Instructions to the Pharmacist : Substitutions allowed      Provider Notes  [0A][0A] [0A]Clinician has verified patient location and identification[0A][_] If patient is a minor parent/guardian and patient are both present.[0A]Mode of Communication: [0A][0A][_] Phone[0A][0A][x] Video[0A]History of Present Illness[0A][0A]The   patient presents to discuss (describe): URINARY URGENCY, BURNING, AND FOUL SMELLING URINE[0A]Onset:GRADUAL[0A]Duration:2-3 DAYS[0A]Last UTI requiring antibiotics: SEVERAL YEARS[0A]Symptoms[0A][0A]Burning with urination?[0A][0A][x] Yes  [_]   No[0A][0A]Comments: [0A][0A]Frequency?[0A][0A][x] Yes  [_] No[0A][0A]Comments: [0A][0A]Sensation of incomplete emptying?[0A][0A][x] Yes  [_]  No[0A][0A]Comments: [0A][0A]Gross hematuria?[0A][0A][_] Yes  [x] No[0A][0A]Comments: [0A][0A]Flank pain?   [0A][0A][_] Yes  [x] No[0A][0A]Comments (location and laterality): [0A][0A]Fever?[0A][0A][_] Yes  [x] No[0A][0A]Comments: [0A][0A]Abdominal or pelvic pain?[0A][0A][_] Yes  [x] No[0A][0A]Comments: [0A][0A]Unexplained vaginal bleeding or   discharge?[0A][0A][_] Yes  [x] No[0A][0A]Comments: [0A][0A]Other?[0A][0A][x] Yes  [_] No[0A][0A]Comments: BLOATING[0A]Relevant Factors[0A][0A]Previous UTI's?[0A][0A][x] Yes  [_] No[0A][0A]Comments: [0A][0A]History of kidney abnormalities or renal   dysfunction?[0A][0A][_] Yes  [x] No[0A][0A]Comments: [0A][0A]History of kidney stones within the last year?[0A][0A][_] Yes  [x] No[0A][0A]Comments: [0A][0A]Recent urinary catheter?[0A][0A][_] Yes  [x] No[0A][0A]Comments: [0A]Past medical history:   HYPOTHYROIDISM[0A]Past surgical history: NONE[0A]Social History:[0A][0A]Tobacco use?[0A][0A][_] Yes  [x] No[0A][0A]Comments: [0A][0A]Alcohol use?[0A][0A][_] Yes  [x] No[0A][0A]Comments: [0A][0A]Recreational drug use?[0A][0A][_] Yes  [x]   No[0A][0A]Comments: [0A][0A][0A]Medications: SYNTHROID 50 MCG[0A]Allergies: NKDA[0A]Is patient pregnant or nursing?[0A][0A][_] Yes  [x] No[0A][0A]Comments: [0A]Examination[0A][0A][0A]General:[0A][0A][x] Normal  [_] Abnormal[0A][0A]Comments (describe key   positive and negative findings): [0A][0A]Chest:[0A][0A][x] Normal  [_] Abnormal[0A][0A]Comments (describe key positive and negative findings): [0A][0A]Abdomen:[0A][0A][x] Normal  [_] Abnormal[0A][0A]Comments (describe key positive and negative findings):   [0A][0A]NO CVAT OR SUPRAPUBIC TENDERNESS[0A][0A]Assessment. acute cystitis without hematuria[0A](consider N30.00 acute cystitis without hematuria or N30.01 acute cystitis with hematuria or R30.0 dysuria)[0A]Plan[0A][0A]1.    Medications: [0A][0A]          a.    Prescriptions: MACROBID 100MG PO BID FOR 5 DAYS[0A]2.    Medical decision making: [0A][0A]         a.    Treatment chosen because:SEVERITY OF SX[0A]3.    Home care and lifestyle modifications: [0A][0A]        a.    You may consider using the   over-the-counter medication called phenazopyridine (such as Azo Standard, Uricalm or generic Urinary Pain Relief) for your urinary pain. Please note, this medication can cause urine to be orange in color and could discolor contact lenses. Contact lens   wearers should remove their contacts each night while using this.[0A][0A]        b.    We recommend you drink plenty of fluids and be sure to use the bathroom when you have the urge to go- don't wait. You may try cranberry juice to reduce the intensity   of symptoms..[0A][0A]4.    Referral or follow up: [0A][0A]        a.    Follow up in 2-3 days if not improving, sooner should your symptoms worsen notably at any point. [0A][0A]        b.    Please seek in-person care right away if any of the following   develop: [0A][0A]                i.    You have fever and chills.[0A][0A]                ii.    You develop abdominal or pelvic pain.[0A][0A]                iii.    Your signs and symptoms get worse, even after treatment.[0A][0A]        c.    If you have   blood in your urine you will need to follow up with your primary care clinician in 6 weeks for a follow up urinalysis to ensure there is no longer even microscopic blood in the urine.[0A]For Virtual Primary Care Program[0A][0A]1.    Gaps in care   closure:  [0A][0A]        a.    All women ages 16-24 should be screened annually for chlamydia.[0A][0A]2.    Labs: Patients who present to their primary care provider on a virtual primary care practice may have labs drawn. All other patients must be   referred for in person care if lab testing is needed.[0A][0A]3.    Referral Management: [0A][0A]4.    Chronic follow up plan: [0A][0A]5.    Additional refills or forms: [0A]Additional recommendations[0A][0A]1.    If you received a prescription at this   visit and you have a  question or problem, please call [phone] for prescription assistance.[0A][0A]2.    Please print a copy of this note and send it to your regular doctor or take it to your next visit so it may be included in your medical   record.[0A][0A]3.    Please see your primary care provider on an annual basis or more frequently if directed. [0A][0A]4.    If you are experiencing any new or worsening symptoms that you believe may be a medical emergency, please seek in-person care   immediately.[0A][0A]5.    Additional recommendations: [0A]The patient voiced understanding and agreement with plan.[0A]    Electronically signed by: Betty Martinez(NPI [id number])

## 2024-09-23 ENCOUNTER — APPOINTMENT (OUTPATIENT)
Dept: PHYSICAL THERAPY | Facility: CLINIC | Age: 34
End: 2024-09-23
Payer: COMMERCIAL

## 2024-09-26 ENCOUNTER — APPOINTMENT (OUTPATIENT)
Dept: PHYSICAL THERAPY | Facility: CLINIC | Age: 34
End: 2024-09-26
Payer: COMMERCIAL

## 2024-11-12 DIAGNOSIS — G43.009 MIGRAINE WITHOUT AURA AND WITHOUT STATUS MIGRAINOSUS, NOT INTRACTABLE: ICD-10-CM

## 2024-11-13 RX ORDER — RIZATRIPTAN BENZOATE 10 MG/1
TABLET, ORALLY DISINTEGRATING ORAL
Qty: 9 TABLET | Refills: 1 | Status: SHIPPED | OUTPATIENT
Start: 2024-11-13

## 2024-12-16 ENCOUNTER — OFFICE VISIT (OUTPATIENT)
Dept: PHYSICAL THERAPY | Facility: CLINIC | Age: 34
End: 2024-12-16
Payer: COMMERCIAL

## 2024-12-16 VITALS — DIASTOLIC BLOOD PRESSURE: 78 MMHG | SYSTOLIC BLOOD PRESSURE: 110 MMHG

## 2024-12-16 DIAGNOSIS — M25.552 LEFT HIP PAIN: Primary | ICD-10-CM

## 2024-12-16 DIAGNOSIS — M25.551 RIGHT HIP PAIN: ICD-10-CM

## 2024-12-16 PROCEDURE — 97110 THERAPEUTIC EXERCISES: CPT

## 2024-12-16 PROCEDURE — 97530 THERAPEUTIC ACTIVITIES: CPT

## 2024-12-16 PROCEDURE — 97161 PT EVAL LOW COMPLEX 20 MIN: CPT

## 2024-12-16 PROCEDURE — 97112 NEUROMUSCULAR REEDUCATION: CPT

## 2024-12-16 NOTE — PROGRESS NOTES
PT Evaluation     Today's date: 2024  Patient name: Sudheer Ortega  : 1990  MRN: [id number]  Referring provider: Anisha Villarreal MD  Dx:   Encounter Diagnosis     ICD-10-CM    1. Right hip pain  M25.551 Ambulatory Referral to Physical Therapy      2. Low back pain radiating to right leg  M54.50 Ambulatory Referral to Physical Therapy    M79.604                      Assessment  Impairments: abnormal gait, abnormal or restricted ROM, impaired physical strength, pain with function and poor posture   Symptom irritability: moderate    Assessment details: Sudheer Ortega  is a pleasant 34 y.o.female who presents with B hip pain/ ant restrictions secondary to decreased hp activation/ overuse and compensatory pattern of movement. Demonstrates decreased hip activation lat/ ER, impingement of the B hips and gross core/ stability deficits throughout. Provided HEP focused on stability/ balance. No referral is necessary at this time based on examination results.       Problem List:  1) Decreased hip activation   2) stability deficits   3) balance deficits     Tolerated session without adverse effects.  Pt. will benefit from skilled PT services that includes manual therapy techniques to enhance tissue extensibility, neuromuscular re-education to facilitate motor control, therapeutic exercise to increase functional mobility, and modalities prn to reduce pain and inflammation.     Goals  Short Term Goals: to be achieved by 4 weeks  1) Patient to be independent with basic HEP.  2) Decrease pain to 2/10 at its worst.  3) Improve even WB to squat with WNL without pain.   4) Increase LE strength by 1/2 MMT grade in all deficient planes.  5) Patient to report decreased sleep interruption secondary to pain.  6) Patient will return to running without pain or limitation by d/c     Long Term Goals: to be achieved by discharge  1) FOTO equal to or greater than MDC  2) Patient to be independent with comprehensive HEP.  3)  "Abolish pain for improved quality of life.  4) Lumbar spine ROM WNL all planes to improve a/iadls.  5) Increase LE strength to 5/5 MMT grade in all planes to improve a/iadls.  6) Patient will be able to perform full workout at gym without pain or limitation by d/c     Plan  Patient would benefit from: PT eval and skilled physical therapy  Referral necessary: No  Planned modality interventions: thermotherapy: hydrocollator packs, cryotherapy and TENS    Planned therapy interventions: IASTM, joint mobilization, kinesiology taping, manual therapy, nerve gliding, neuromuscular re-education, patient/caregiver education, strengthening, stretching, therapeutic activities, therapeutic exercise and home exercise program    Frequency: 2x week  Duration in weeks: 8  Treatment plan discussed with: patient        Subjective Evaluation    History of Present Illness  Mechanism of injury: Sudheer Ortega presents with c/c of LBP/ hip pain. Symptoms began are of chronic origin and patient was previously attending PT with relief. Notes ant hip pain- reports she has been performing exercises at home. Reports periodic tingling in the 2nd/ 3rd toes. Notes clicking/ snapping in the hip post running.   Aggravating factors: tight after working out/ running/ standing for prolonged periods > 1 hour   Relieving factors: nothing   24hr pain pattern: 0/10 (current), 0/10 (best), 8/10 (worst), location:ant hips , descriptors: tightness/ discomfort   Imaging: RIGHT HIP     INDICATION:   Pain in right hip.      COMPARISON:  None.     VIEWS:  XR HIP/PELV 2-3 VWS RIGHT W PELVIS IF PERFORMED      FINDINGS:     There is no acute fracture or dislocation.     No significant hip degenerative changes.     No lytic or blastic osseous lesion.     Soft tissues are unremarkable.     Degenerative changes visualized lower lumbar spine.     IMPRESSION:    Previous treatments: PT  Occupation/recreation: [employer]   Patient goals: \" looser hips/ to be able to run without " "hip discomfort\"            Recurrent probem    Quality of life: good          Objective     Concurrent Complaints  Negative for night pain, disturbed sleep, bladder dysfunction, bowel dysfunction and saddle (S4) numbness    Palpation     Additional Palpation Details  Palpable spamsing and tenderness in B hip flexors psoas etc   Equal restrictions and tenderness throughout  Lat hip tightness/ pain  Mild gliute atrophy   Knee hyperextension     Lumbar Screen  Lumbar range of motion within normal limits with the following exceptions:See lumbar     Neurological Testing     Sensation     Hip   Left Hip   Intact: light touch    Right Hip   Intact: light touch    Comments   Left light touch: L2-S1.   Right light touch: L2-S1.     Reflexes   Left   Patellar (L4): normal (2+)  Achilles (S1): normal (2+)    Right   Patellar (L4): normal (2+)  Achilles (S1): normal (2+)    Active Range of Motion     Lumbar   Flexion:  WFL  Extension:  WFL  Left lateral flexion:  Restriction level: minimal  Right lateral flexion:  Restriction level: minimal  Left rotation:  WFL and with pain  Right rotation:  WFL and with pain    Joint Play   Left Hip   Hypomobile in the anterior hip capsule.    Right Hip     Hypomobile in the anterior hip capsule    Strength/Myotome Testing     Lumbar   Left   Heel walk: normal  Toe walk: normal    Right   Heel walk: normal  Toe walk: normal    Left Hip   Planes of Motion   Flexion: 5  Extension: 4  Abduction: 4-  Adduction: 4+  External rotation: 4-  Internal rotation: 4    Right Hip   Planes of Motion   Flexion: 5  Extension: 4  Abduction: 4-  Adduction: 4+  External rotation: 4-  Internal rotation: 4    Left Knee   Flexion: 4  Extension: 4+    Right Knee   Flexion: 4  Extension: 4+    Left Ankle/Foot   Dorsiflexion: 5  Plantar flexion: 5    Right Ankle/Foot   Dorsiflexion: 5  Plantar flexion: 5    Muscle Activation   Patient able to activate left transverse abdominals, left multifidus, right transverse " "abdominals and right multifidus.     Tests     Left Hip   Positive MAC and FADIR.     Right Hip   Positive MAC and FADIR.     Additional Tests Details  WILIAN    General Comments:      Lumbar Comments  Squat reduced valgus uneven WB with increased WB on the R with return to stand     SLS: R 7 sec   L 5 sec       Flowsheet Rows      Flowsheet Row Most Recent Value   PT/OT G-Codes    Current Score 53   Projected Score 61               Precautions: migraines, hypothyroid, lumbar spondylosis, iron deficiency anemia,          Manuals 12/16            Cupping              Hip flexor release                                        Neuro Re-Ed 12/16            Bridges (HEP) 5\" x 20             Clamshells (HEP) 3\" x 20             S/l abduction (HEP) 3\" x 20             Bird dogs (HEP) x15                                                   Ther Ex 12/16            Hip ER stretch (HEP) 3x30\"             Kneeling squats              Half kneeling              Sidestepping              Lunges                                                     Ther Activity 12/16            Elliptical              Patient education  KR                                      FOTO             Modalities 12/16                                           "

## 2024-12-23 ENCOUNTER — OFFICE VISIT (OUTPATIENT)
Dept: PHYSICAL THERAPY | Facility: CLINIC | Age: 34
End: 2024-12-23
Payer: COMMERCIAL

## 2024-12-23 DIAGNOSIS — M25.551 RIGHT HIP PAIN: Primary | ICD-10-CM

## 2024-12-23 DIAGNOSIS — M25.552 LEFT HIP PAIN: ICD-10-CM

## 2024-12-23 PROCEDURE — 97110 THERAPEUTIC EXERCISES: CPT

## 2024-12-23 PROCEDURE — 97140 MANUAL THERAPY 1/> REGIONS: CPT

## 2024-12-23 PROCEDURE — 97530 THERAPEUTIC ACTIVITIES: CPT

## 2024-12-23 PROCEDURE — 97112 NEUROMUSCULAR REEDUCATION: CPT

## 2024-12-23 NOTE — PROGRESS NOTES
"Daily Note     Today's date: 2024  Patient name: Sudheer Ortega  : 1990  MRN: [id number]  Referring provider: Anisha Villarreal MD  Dx:   Encounter Diagnosis     ICD-10-CM    1. Right hip pain  M25.551       2. Left hip pain  M25.552                      Subjective: PT presents to PT reporting mild pain in R hip and low back.  She reports stiffness in low back.      Objective: See treatment diary below      Assessment: Pt demonstrates good tolerance to progressions with no complaints.  She demonstrates good form and requires minimal cuing.  Noted fatigue with side stepping as laps progressed.  Noted tightness in R anterior hip with manual therapy; pt reports positive response post manual noting increased flexibility.  Patient demonstrated fatigue post treatment, exhibited good technique with therapeutic exercises, and would benefit from continued PT to increase flexibility, strength and function.      Plan: Continue per plan of care.      Precautions: migraines, hypothyroid, lumbar spondylosis, iron deficiency anemia,          Manuals            Cupping              Hip flexor release   PK                                     Neuro Re-Ed            Bridges (HEP) 5\" x 20  5\" x 20              Clamshells (HEP) 3\" x 20  3\" x 20            S/l abduction (HEP) 3\" x 20  3\" x 20            Bird dogs (HEP) x15 15x                                                  Ther Ex            Hip ER stretch (HEP) 3x30\"  3x30\"            Kneeling squats   20x           Half kneeling c chop  Blue weighted ball 15x           Sidestepping   BTB 4 laps           Lunges   10x ea static                                                  Ther Activity            Elliptical   6'           Patient education  KR PK                                     FOTO             Modalities                                           "

## 2024-12-26 ENCOUNTER — APPOINTMENT (OUTPATIENT)
Dept: PHYSICAL THERAPY | Facility: CLINIC | Age: 34
End: 2024-12-26
Payer: COMMERCIAL

## 2024-12-27 ENCOUNTER — TELEPHONE (OUTPATIENT)
Age: 34
End: 2024-12-27

## 2024-12-27 NOTE — TELEPHONE ENCOUNTER
Patient called to schedule D&V and wanted to inform [place] that she is pregnant since patient had a preconception appt. Patient is scheduled for D&V 01/10/2025 at [place]. LMP 11/15/2024

## 2024-12-30 ENCOUNTER — OFFICE VISIT (OUTPATIENT)
Dept: PHYSICAL THERAPY | Facility: CLINIC | Age: 34
End: 2024-12-30
Payer: COMMERCIAL

## 2024-12-30 DIAGNOSIS — M25.552 LEFT HIP PAIN: ICD-10-CM

## 2024-12-30 DIAGNOSIS — M25.551 RIGHT HIP PAIN: Primary | ICD-10-CM

## 2024-12-30 PROCEDURE — 97110 THERAPEUTIC EXERCISES: CPT

## 2024-12-30 PROCEDURE — 97112 NEUROMUSCULAR REEDUCATION: CPT

## 2024-12-30 PROCEDURE — 97140 MANUAL THERAPY 1/> REGIONS: CPT

## 2024-12-30 PROCEDURE — 97530 THERAPEUTIC ACTIVITIES: CPT

## 2024-12-30 NOTE — PROGRESS NOTES
"Daily Note     Today's date: 2024  Patient name: Sudheer Ortega  : 1990  MRN: [id number]  Referring provider: Anisha Villarreal MD  Dx:   Encounter Diagnosis     ICD-10-CM    1. Right hip pain  M25.551       2. Left hip pain  M25.552                        Subjective: \" I am having some pain in my quad today\"       Objective: See treatment diary below      Assessment: Sudheer presents to therapy with hip pain. Arrived with increased lat quad pain. Attempted stick and manual release with minimal change Progressed stability exercises with ball today with fatigue in the form of shaking throughout. Progressed lat/ ext activation- fatigue in the glute throughout. Focused on lat hip/ glute activation due to suspected strain in med quad advised to perform HEP tp tolerance and reduce overuse.  Tolerated session without adverse effects. Recommend continued skilled therapy to improve overall strength and mobility for functional return with decreased compensation and pain.        Plan: Continue per plan of care.      Precautions: migraines, hypothyroid, lumbar spondylosis, iron deficiency anemia,          Manuals           Cupping              Hip flexor release   PK           Stick release    KR          Tennis ball release quad   KR          Neuro Re-Ed           Bridges (HEP) 5\" x 20  5\" x 20    W/ pball 5\" x 20           Clamshells (HEP) 3\" x 20  3\" x 20  5\" x 20           S/l abduction (HEP) 3\" x 20  3\" x 20            Bird dogs (HEP) x15 15x           SLR with ER   15 ea           Standing hip abduction    GTB x 20 B                        Ther Ex           Hip ER stretch (HEP) 3x30\"  3x30\"  3x30\"           Kneeling squats   20x           Half kneeling c chop  Blue weighted ball 15x           Sidestepping   BTB 4 laps           Lunges   10x ea static           Multifidi lifts    X10 B           Wall sits    3x30\"                        Ther Activity  " 12/23 12/30          Elliptical   6' 6'          Patient education  KR PK KR                                    FOTO             Modalities 12/16 12/23 12/30

## 2025-01-02 ENCOUNTER — APPOINTMENT (OUTPATIENT)
Dept: PHYSICAL THERAPY | Facility: CLINIC | Age: 35
End: 2025-01-02
Payer: COMMERCIAL

## 2025-01-04 ENCOUNTER — OFFICE VISIT (OUTPATIENT)
Dept: URGENT CARE | Facility: MEDICAL CENTER | Age: 35
End: 2025-01-04
Payer: COMMERCIAL

## 2025-01-04 VITALS
OXYGEN SATURATION: 99 % | HEART RATE: 91 BPM | WEIGHT: 206 LBS | SYSTOLIC BLOOD PRESSURE: 128 MMHG | DIASTOLIC BLOOD PRESSURE: 81 MMHG | BODY MASS INDEX: 32.33 KG/M2 | RESPIRATION RATE: 20 BRPM | HEIGHT: 67 IN | TEMPERATURE: 97.4 F

## 2025-01-04 DIAGNOSIS — B34.9 ACUTE VIRAL SYNDROME: Primary | ICD-10-CM

## 2025-01-04 PROCEDURE — 99213 OFFICE O/P EST LOW 20 MIN: CPT | Performed by: PHYSICIAN ASSISTANT

## 2025-01-04 NOTE — PROGRESS NOTES
[facility]        NAME: Sudheer Ortega is a 34 y.o. female  : 1990    MRN: [id number]  DATE: 2025  TIME: 11:42 AM    Assessment and Plan   Acute viral syndrome [B34.9]  1. Acute viral syndrome              Patient Instructions       Follow up with PCP as needed.  Increase fluids.  I gave her a list of medications approved during pregnancy.    If tests have been performed at [facility], our office will contact you with results if changes need to be made to the care plan discussed with you at the visit.  You can review your full results on [facility] MyChart.    Chief Complaint     Chief Complaint   Patient presents with    Cold Like Symptoms     Symptoms 1 week. States just found out she is 5 weeks pregnant         History of Present Illness       Is in her first trimester of pregnancy.    URI   This is a new problem. The current episode started in the past 7 days. The problem has been unchanged. There has been no fever. Associated symptoms include congestion, coughing, rhinorrhea and sneezing. Pertinent negatives include no abdominal pain, chest pain, diarrhea, dysuria, ear pain, headaches, joint pain, joint swelling, nausea, neck pain, plugged ear sensation, rash, sinus pain, sore throat, swollen glands, vomiting or wheezing. She has tried increased fluids for the symptoms. The treatment provided no relief.       Review of Systems   Review of Systems   HENT:  Positive for congestion, rhinorrhea and sneezing. Negative for ear pain, sinus pain and sore throat.    Respiratory:  Positive for cough. Negative for wheezing.    Cardiovascular:  Negative for chest pain.   Gastrointestinal:  Negative for abdominal pain, diarrhea, nausea and vomiting.   Genitourinary:  Negative for dysuria.   Musculoskeletal:  Negative for joint pain and neck pain.   Skin:  Negative for rash.   Neurological:  Negative for headaches.   All other systems reviewed and are negative.        Current Medications        Current Outpatient Medications:     levothyroxine 50 mcg tablet, TAKE 1 TABLET BY MOUTH DAILY ON AN EMPTY STOMACH 30 MINUTES PRIOR TO EATING FOOD OR DRINK, Disp: 90 tablet, Rfl: 1    meclizine (ANTIVERT) 25 mg tablet, Take 0.5-1 tablets (12.5-25 mg total) by mouth every 8 (eight) hours as needed for dizziness for up to 10 days, Disp: 30 tablet, Rfl: 0    metaxalone (SKELAXIN) 800 mg tablet, Take 1 tablet (800 mg total) by mouth daily at bedtime as needed for muscle spasms, Disp: 30 tablet, Rfl: 0    Multiple Vitamin (MULTIVITAMINS PO), Take 1 tablet by mouth daily , Disp: , Rfl:     Norethin-Eth Estrad-Fe Biphas 1 MG-10 MCG / 10 MCG TABS, Take 1 tablet by mouth daily Rx per Gyn, Disp: , Rfl:     ondansetron (ZOFRAN-ODT) 8 mg disintegrating tablet, Take 1 tablet (8 mg total) by mouth every 8 (eight) hours as needed for nausea (Migraine) for up to 10 days, Disp: 30 tablet, Rfl: 0    prochlorperazine (COMPAZINE) 10 mg tablet, Take 1 tablet (10 mg total) by mouth every 8 (eight) hours as needed for nausea or vomiting (Migraine), Disp: 30 tablet, Rfl: 0    rizatriptan (MAXALT-MLT) 10 mg disintegrating tablet, DISSOLVE 1 TABLET BY MOUTH AT ONSET OF MIGRAINE. IF SYMPTOMS CONTINUE/RETURN MAY TAKE ANOTHER TAB 2 HOURS LATER. MAX 2/DAY, Disp: 9 tablet, Rfl: 1    Current Allergies     Allergies as of 01/04/2025 - Reviewed 01/04/2025   Allergen Reaction Noted    Pollen extract Sneezing 05/19/2021            The following portions of the patient's history were reviewed and updated as appropriate: allergies, current medications, past family history, past medical history, past social history, past surgical history and problem list.     Past Medical History:   Diagnosis Date    Anemia 2005    Arthritis 2005    Class 1 obesity without serious comorbidity with body mass index (BMI) of 30.0 to 30.9 in adult     Class 1 obesity without serious comorbidity with body mass index (BMI) of 31.0 to 31.9 in adult     Dysmenorrhea      "Hyperlipidemia     Hypothyroid     Iron deficiency anemia     Lumbar herniated disc     s/p LAINEY    Migraines     Ovarian cyst     On OCP per Gyn    Overweight        Past Surgical History:   Procedure Laterality Date    SPINE SURGERY  12/2019    On record at [facility]    WISDOM TOOTH EXTRACTION         Family History   Problem Relation Age of Onset    No Known Problems Mother     Hypertension Father     Scoliosis Sister     Endometriosis Sister     Ovarian cysts Sister     Melanoma Sister 33    Depression Brother         Anxiety related    Anxiety disorder Brother     Breast cancer Maternal Grandmother     Rheum arthritis Maternal Grandmother         Hands effected    Arthritis Maternal Grandmother         General    Cancer Maternal Grandmother         Breast    Heart disease Maternal Grandfather     Diabetes Maternal Grandfather     Stroke Maternal Grandfather         Stroke and heart attack    Lung cancer Paternal Grandmother          Medications have been verified.        Objective   /81   Pulse 91   Temp (!) 97.4 °F (36.3 °C)   Resp 20   Ht 5' 7\" (1.702 m)   Wt 93.4 kg (206 lb)   SpO2 99%   BMI 32.26 kg/m²   No LMP recorded.       Physical Exam     Physical Exam  Vitals and nursing note reviewed.   Constitutional:       Appearance: Normal appearance. She is normal weight.   HENT:      Right Ear: Tympanic membrane, ear canal and external ear normal.      Left Ear: Tympanic membrane, ear canal and external ear normal.      Nose: Congestion and rhinorrhea present.      Mouth/Throat:      Mouth: Mucous membranes are moist.      Pharynx: No oropharyngeal exudate or posterior oropharyngeal erythema.   Eyes:      Conjunctiva/sclera: Conjunctivae normal.   Cardiovascular:      Rate and Rhythm: Normal rate and regular rhythm.      Pulses: Normal pulses.      Heart sounds: Normal heart sounds.   Pulmonary:      Effort: Pulmonary effort is normal.      Breath sounds: Normal breath sounds.   Neurological:      " Mental Status: She is alert.   Psychiatric:         Mood and Affect: Mood normal.         Behavior: Behavior normal.

## 2025-01-06 ENCOUNTER — APPOINTMENT (OUTPATIENT)
Dept: PHYSICAL THERAPY | Facility: CLINIC | Age: 35
End: 2025-01-06
Payer: COMMERCIAL

## 2025-01-09 ENCOUNTER — APPOINTMENT (OUTPATIENT)
Dept: PHYSICAL THERAPY | Facility: CLINIC | Age: 35
End: 2025-01-09
Payer: COMMERCIAL

## 2025-01-10 ENCOUNTER — ULTRASOUND (OUTPATIENT)
Dept: OBGYN CLINIC | Facility: CLINIC | Age: 35
End: 2025-01-10
Payer: COMMERCIAL

## 2025-01-10 VITALS
DIASTOLIC BLOOD PRESSURE: 60 MMHG | WEIGHT: 203.6 LBS | BODY MASS INDEX: 31.96 KG/M2 | SYSTOLIC BLOOD PRESSURE: 104 MMHG | HEIGHT: 67 IN

## 2025-01-10 DIAGNOSIS — N91.2 AMENORRHEA: Primary | ICD-10-CM

## 2025-01-10 PROCEDURE — 99214 OFFICE O/P EST MOD 30 MIN: CPT

## 2025-01-10 PROCEDURE — 76817 TRANSVAGINAL US OBSTETRIC: CPT

## 2025-01-10 NOTE — PROGRESS NOTES
Assessment/Plan:  Diagnoses and all orders for this visit:    Amenorrhea  -     AMB US OB < 14 weeks single or first gestation level 1  -     Ambulatory Referral to Maternal Fetal Medicine; Future      - Viable IUP @ 7w 1d EGA  - PADMAJA 2025  - Continue PNV  - Patient to call for concerns  - RTO ~ 10-12 weeks for OB intake  - call MFM for 13 week NT scan/genetic testing.     Subjective:       Patient ID: Sudheer Ortega 1990     Sudheer Ortega is a 34 y.o.  presenting to the office for pregnancy confirmation. Patient's last menstrual period was 11/15/2024. , placing her at 8w0d today with PADMAJA of 2025. She is feeling well. She recently had the flu but is feeling better.     Hypothyroidism on levothyroxine 50mcg    Nausea:no  Vomiting: no  Bleeding: no  Cramping: no  Headaches: yes  Fatigue: no  Constipation: no  Blood type, if known: unknown     OB History    Para Term  AB Living   1 0 0 0 0 0   SAB IAB Ectopic Multiple Live Births   0 0 0 0 0      # Outcome Date GA Lbr Raciel/2nd Weight Sex Type Anes PTL Lv   1 Current               Obstetric Comments   Menarche 12    On lo Loestrin- no menstrual bleeding      The following portions of the patient's history were reviewed and updated as appropriate: allergies, current medications, past family history, past medical history, past social history, past surgical history, and problem list.    Allergies:  Pollen extract    Medications:    Current Outpatient Medications:     levothyroxine 50 mcg tablet, TAKE 1 TABLET BY MOUTH DAILY ON AN EMPTY STOMACH 30 MINUTES PRIOR TO EATING FOOD OR DRINK, Disp: 90 tablet, Rfl: 1    Multiple Vitamin (MULTIVITAMINS PO), Take 1 tablet by mouth daily , Disp: , Rfl:     meclizine (ANTIVERT) 25 mg tablet, Take 0.5-1 tablets (12.5-25 mg total) by mouth every 8 (eight) hours as needed for dizziness for up to 10 days, Disp: 30 tablet, Rfl: 0    metaxalone (SKELAXIN) 800 mg tablet, Take 1 tablet (800 mg total) by mouth  "daily at bedtime as needed for muscle spasms (Patient not taking: Reported on 1/10/2025), Disp: 30 tablet, Rfl: 0    ondansetron (ZOFRAN-ODT) 8 mg disintegrating tablet, Take 1 tablet (8 mg total) by mouth every 8 (eight) hours as needed for nausea (Migraine) for up to 10 days, Disp: 30 tablet, Rfl: 0    prochlorperazine (COMPAZINE) 10 mg tablet, Take 1 tablet (10 mg total) by mouth every 8 (eight) hours as needed for nausea or vomiting (Migraine) (Patient not taking: Reported on 1/10/2025), Disp: 30 tablet, Rfl: 0    rizatriptan (MAXALT-MLT) 10 mg disintegrating tablet, DISSOLVE 1 TABLET BY MOUTH AT ONSET OF MIGRAINE. IF SYMPTOMS CONTINUE/RETURN MAY TAKE ANOTHER TAB 2 HOURS LATER. MAX 2/DAY (Patient not taking: Reported on 1/10/2025), Disp: 9 tablet, Rfl: 1      Objective:    Visit Vitals  /60 (BP Location: Left arm, Patient Position: Sitting, Cuff Size: Standard)   Ht 5' 7\" (1.702 m)   Wt 92.4 kg (203 lb 9.6 oz)   LMP 11/15/2024   BMI 31.89 kg/m²   OB Status Pregnant   Smoking Status Never   BSA 2.04 m²     GEN: The patient was alert and oriented x3, pleasant well-appearing female in no acute distress.   PULM: nonlabored respirations  MSK: Normal gait  : WNL  Skin: warm, dry  Neuro: no focal deficits  Psych: normal affect and judgement, cooperative    Ultrasound:     Viability US     Gestational sac: present               Location: intrauterine  Yolk sac: present  Fetal pole: present               CRL: 1.02 cm = 7w1d  Cardiac activity: present               Rate: 153 bpm     Ovaries: normal appearing bilaterally  Cul de sac: absence of free fluid  Uterus: normal in appearance           Ultrasound Probe Disinfection    A transvaginal ultrasound was performed.   Prior to use, disinfection was performed with High Level Disinfection Process (Trophon)  Probe serial number [id number]: [id number] was used    I have spent a total time of 35 minutes in caring for this patient on the day of the visit/encounter including " Diagnostic results, Risks and benefits of tx options, Instructions for management, Patient and family education, Documenting in the medical record, Reviewing / ordering tests, medicine, procedures  , and Obtaining or reviewing history  .     Eileen Rock PA-C  01/10/25  4:03 PM

## 2025-01-10 NOTE — PROGRESS NOTES
Pregnancy confirmation ultrasound   LMP 11/15/2024  +UPT taken the week of 12/20/2024    Would like to know if she can take Maxalt during pregnancy

## 2025-01-13 ENCOUNTER — OFFICE VISIT (OUTPATIENT)
Dept: PHYSICAL THERAPY | Facility: CLINIC | Age: 35
End: 2025-01-13
Payer: COMMERCIAL

## 2025-01-13 DIAGNOSIS — M25.552 LEFT HIP PAIN: ICD-10-CM

## 2025-01-13 DIAGNOSIS — M25.551 RIGHT HIP PAIN: Primary | ICD-10-CM

## 2025-01-13 PROCEDURE — 97530 THERAPEUTIC ACTIVITIES: CPT

## 2025-01-13 PROCEDURE — 97110 THERAPEUTIC EXERCISES: CPT

## 2025-01-13 PROCEDURE — 97112 NEUROMUSCULAR REEDUCATION: CPT

## 2025-01-13 NOTE — PROGRESS NOTES
"Daily Note     Today's date: 2025  Patient name: Sudheer Ortega  : 1990  MRN: [id number]  Referring provider: Anisha Villarreal MD  Dx:   Encounter Diagnosis     ICD-10-CM    1. Right hip pain  M25.551       2. Left hip pain  M25.552                          Subjective: \" I was really sick for the past two weeks so my back is a bit sore from throwing up\"    Objective: See treatment diary below      Assessment: Sudheer presents to therapy with hip pain. Focused on facilitating reduced stiffness in the lumbar today. Continues to progress stability and core activation to tolerance. Noted increased fatigue today as compared to previous sessions.  Gross R sided weakness with decreased balance on the R and increased sway.  Tolerated session without adverse effects. Recommend continued skilled therapy to improve overall strength and mobility for functional return with decreased compensation and pain.        Plan: Continue per plan of care.      Precautions: migraines, hypothyroid, lumbar spondylosis, iron deficiency anemia, pregnancy current          Manuals          Cupping              Hip flexor release   PK           Stick release    KR          Tennis ball release quad   KR          Neuro Re-Ed          Bridges (HEP) 5\" x 20  5\" x 20    W/ pball 5\" x 20  W/ pball 5\" x 20          Clamshells (HEP) 3\" x 20  3\" x 20  5\" x 20           S/l abduction (HEP) 3\" x 20  3\" x 20   x20         Bird dogs (HEP) x15 15x  15 X         SLR with ER   15 ea           Standing hip abduction    GTB x 20 B           June rows/ ext     15# ea x 30          Cat/ camel     15         Ther Ex          Hip ER stretch (HEP) 3x30\"  3x30\"  3x30\"  3x30\" with rot          Kneeling squats   20x           Half kneeling c chop  Blue weighted ball 15x           Sidestepping   BTB 4 laps           LP    145# x 20          Lunges   10x ea static           Bridges off mat     26# " "x 20         Multifidi lifts    X10 B  15 B         Wall sits    3x30\"           Open book stretch     5x10\"          Ther Activity 12/16 12/23 12/30 1/13         Elliptical   6' 6' nv         Patient education  KR PK KR KR         Suitcase steffi     15# x 5 laps                       FOTO             Modalities 12/16 12/23 12/30 1/13                                        "

## 2025-01-14 DIAGNOSIS — E03.9 HYPOTHYROIDISM, UNSPECIFIED TYPE: Primary | ICD-10-CM

## 2025-01-16 DIAGNOSIS — G43.009 MIGRAINE WITHOUT AURA AND WITHOUT STATUS MIGRAINOSUS, NOT INTRACTABLE: ICD-10-CM

## 2025-01-19 RX ORDER — PROCHLORPERAZINE MALEATE 10 MG
10 TABLET ORAL EVERY 8 HOURS PRN
Qty: 30 TABLET | Refills: 0 | Status: SHIPPED | OUTPATIENT
Start: 2025-01-19

## 2025-01-20 ENCOUNTER — OFFICE VISIT (OUTPATIENT)
Dept: PHYSICAL THERAPY | Facility: CLINIC | Age: 35
End: 2025-01-20
Payer: COMMERCIAL

## 2025-01-20 DIAGNOSIS — M25.552 LEFT HIP PAIN: ICD-10-CM

## 2025-01-20 DIAGNOSIS — M25.551 RIGHT HIP PAIN: Primary | ICD-10-CM

## 2025-01-20 PROCEDURE — 97530 THERAPEUTIC ACTIVITIES: CPT

## 2025-01-20 PROCEDURE — 97140 MANUAL THERAPY 1/> REGIONS: CPT

## 2025-01-20 PROCEDURE — 97110 THERAPEUTIC EXERCISES: CPT

## 2025-01-20 PROCEDURE — 97112 NEUROMUSCULAR REEDUCATION: CPT

## 2025-01-20 NOTE — PROGRESS NOTES
"Daily Note     Today's date: 2025  Patient name: Sudheer Ortega  : 1990  MRN: [id number]  Referring provider: Anisha Villarreal MD  Dx:   Encounter Diagnosis     ICD-10-CM    1. Right hip pain  M25.551       2. Left hip pain  M25.552                 Start Time: 1630          Subjective: \" I still have some of that back pain/ mainly when I lift and move\"     Objective: See treatment diary below      Assessment: Sudheer presents to therapy with hip pain.  Noted continued mild LBP today upon arrival. Added MFD with movement to facilitate improved lumbar mobility. Progressed core and LE activation cuing to reduce ER compensation with exercises in the hip noting lateral fatigue. Note gross fatigue with SLS activation and DL continuing  to improve form. Tolerated session without adverse effects. Recommend continued skilled therapy to improve overall strength and mobility for functional return with decreased compensation and pain.        Plan: Continue per plan of care.      Precautions: migraines, hypothyroid, lumbar spondylosis, iron deficiency anemia, pregnancy current          Manuals         Cupping              Hip flexor release   PK           MFD     B LB KR        Stick release    KR          Tennis ball release quad   KR          Neuro Re-Ed         Bridges (HEP) 5\" x 20  5\" x 20    W/ pball 5\" x 20  W/ pball 5\" x 20          Clamshells (HEP) 3\" x 20  3\" x 20  5\" x 20           S/l abduction (HEP) 3\" x 20  3\" x 20   x20         Bird dogs (HEP) x15 15x  15 X 15x with MFD         SL DL      15# x 10 B         SLR with ER   15 ea           Standing hip abduction    GTB x 20 B   BTB x 20 B ankle        Table tops      X 15 B         June rows/ ext     15# ea x 30          Cat/ camel     15 15 with MFD         Ther Ex         Hip ER stretch (HEP) 3x30\"  3x30\"  3x30\"  3x30\" with rot          Kneeling squats   20x           Half " "kneeling c chop  Blue weighted ball 15x           Sidestepping   BTB 4 laps   BTB x 5 laps ankle         LP    145# x 20          Lunges   10x ea static           Bridges off mat     26# x 20         Multifidi lifts    X10 B  15 B 15 B with MFD         Wall sits    3x30\"           Open book stretch     5x10\"          Ther Activity 12/16 12/23 12/30 1/13 1/20        Elliptical   6' 6' nv 5'         Patient education  KR PK KR KR KR        Suitcase marches     15# x 5 laps                       FOTO     X        Modalities 12/16 12/23 12/30 1/13 1/20                                       "

## 2025-01-27 ENCOUNTER — RESULTS FOLLOW-UP (OUTPATIENT)
Dept: FAMILY MEDICINE CLINIC | Facility: CLINIC | Age: 35
End: 2025-01-27

## 2025-01-27 ENCOUNTER — APPOINTMENT (OUTPATIENT)
Dept: LAB | Facility: CLINIC | Age: 35
End: 2025-01-27
Payer: COMMERCIAL

## 2025-01-27 ENCOUNTER — OFFICE VISIT (OUTPATIENT)
Dept: PHYSICAL THERAPY | Facility: CLINIC | Age: 35
End: 2025-01-27
Payer: COMMERCIAL

## 2025-01-27 ENCOUNTER — TELEPHONE (OUTPATIENT)
Age: 35
End: 2025-01-27

## 2025-01-27 DIAGNOSIS — M25.552 LEFT HIP PAIN: ICD-10-CM

## 2025-01-27 DIAGNOSIS — Z13.6 SCREENING FOR CARDIOVASCULAR CONDITION: ICD-10-CM

## 2025-01-27 DIAGNOSIS — E03.9 HYPOTHYROIDISM, UNSPECIFIED TYPE: ICD-10-CM

## 2025-01-27 DIAGNOSIS — E78.5 HYPERLIPIDEMIA, UNSPECIFIED HYPERLIPIDEMIA TYPE: ICD-10-CM

## 2025-01-27 DIAGNOSIS — E03.9 HYPOTHYROIDISM, UNSPECIFIED TYPE: Primary | ICD-10-CM

## 2025-01-27 DIAGNOSIS — G43.009 MIGRAINE WITHOUT AURA AND WITHOUT STATUS MIGRAINOSUS, NOT INTRACTABLE: ICD-10-CM

## 2025-01-27 DIAGNOSIS — E66.811 CLASS 1 OBESITY WITHOUT SERIOUS COMORBIDITY WITH BODY MASS INDEX (BMI) OF 31.0 TO 31.9 IN ADULT, UNSPECIFIED OBESITY TYPE: ICD-10-CM

## 2025-01-27 DIAGNOSIS — D50.9 IRON DEFICIENCY ANEMIA, UNSPECIFIED IRON DEFICIENCY ANEMIA TYPE: ICD-10-CM

## 2025-01-27 DIAGNOSIS — M25.551 RIGHT HIP PAIN: Primary | ICD-10-CM

## 2025-01-27 LAB
ALBUMIN SERPL BCG-MCNC: 4 G/DL (ref 3.5–5)
ALP SERPL-CCNC: 52 U/L (ref 34–104)
ALT SERPL W P-5'-P-CCNC: 18 U/L (ref 7–52)
ANION GAP SERPL CALCULATED.3IONS-SCNC: 9 MMOL/L (ref 4–13)
AST SERPL W P-5'-P-CCNC: 18 U/L (ref 13–39)
BASOPHILS # BLD AUTO: 0.03 THOUSANDS/ΜL (ref 0–0.1)
BASOPHILS NFR BLD AUTO: 1 % (ref 0–1)
BILIRUB SERPL-MCNC: 0.42 MG/DL (ref 0.2–1)
BUN SERPL-MCNC: 8 MG/DL (ref 5–25)
CALCIUM SERPL-MCNC: 9 MG/DL (ref 8.4–10.2)
CHLORIDE SERPL-SCNC: 107 MMOL/L (ref 96–108)
CHOLEST SERPL-MCNC: 186 MG/DL (ref ?–200)
CO2 SERPL-SCNC: 24 MMOL/L (ref 21–32)
CREAT SERPL-MCNC: 0.64 MG/DL (ref 0.6–1.3)
EOSINOPHIL # BLD AUTO: 0.04 THOUSAND/ΜL (ref 0–0.61)
EOSINOPHIL NFR BLD AUTO: 1 % (ref 0–6)
ERYTHROCYTE [DISTWIDTH] IN BLOOD BY AUTOMATED COUNT: 12.9 % (ref 11.6–15.1)
GFR SERPL CREATININE-BSD FRML MDRD: 116 ML/MIN/1.73SQ M
GLUCOSE P FAST SERPL-MCNC: 78 MG/DL (ref 65–99)
HCT VFR BLD AUTO: 39.4 % (ref 34.8–46.1)
HDLC SERPL-MCNC: 73 MG/DL
HGB BLD-MCNC: 13 G/DL (ref 11.5–15.4)
IMM GRANULOCYTES # BLD AUTO: 0.02 THOUSAND/UL (ref 0–0.2)
IMM GRANULOCYTES NFR BLD AUTO: 0 % (ref 0–2)
LDLC SERPL CALC-MCNC: 98 MG/DL (ref 0–100)
LDLC SERPL DIRECT ASSAY-MCNC: 106 MG/DL (ref 0–100)
LYMPHOCYTES # BLD AUTO: 2.4 THOUSANDS/ΜL (ref 0.6–4.47)
LYMPHOCYTES NFR BLD AUTO: 36 % (ref 14–44)
MAGNESIUM SERPL-MCNC: 2.3 MG/DL (ref 1.9–2.7)
MCH RBC QN AUTO: 31 PG (ref 26.8–34.3)
MCHC RBC AUTO-ENTMCNC: 33 G/DL (ref 31.4–37.4)
MCV RBC AUTO: 94 FL (ref 82–98)
MONOCYTES # BLD AUTO: 0.52 THOUSAND/ΜL (ref 0.17–1.22)
MONOCYTES NFR BLD AUTO: 8 % (ref 4–12)
NEUTROPHILS # BLD AUTO: 3.59 THOUSANDS/ΜL (ref 1.85–7.62)
NEUTS SEG NFR BLD AUTO: 54 % (ref 43–75)
NONHDLC SERPL-MCNC: 113 MG/DL
NRBC BLD AUTO-RTO: 0 /100 WBCS
PLATELET # BLD AUTO: 264 THOUSANDS/UL (ref 149–390)
PMV BLD AUTO: 9.3 FL (ref 8.9–12.7)
POTASSIUM SERPL-SCNC: 4.2 MMOL/L (ref 3.5–5.3)
PROT SERPL-MCNC: 6.7 G/DL (ref 6.4–8.4)
RBC # BLD AUTO: 4.2 MILLION/UL (ref 3.81–5.12)
SODIUM SERPL-SCNC: 140 MMOL/L (ref 135–147)
TRIGL SERPL-MCNC: 73 MG/DL (ref ?–150)
TSH SERPL DL<=0.05 MIU/L-ACNC: 2.92 UIU/ML (ref 0.45–4.5)
WBC # BLD AUTO: 6.6 THOUSAND/UL (ref 4.31–10.16)

## 2025-01-27 PROCEDURE — 36415 COLL VENOUS BLD VENIPUNCTURE: CPT

## 2025-01-27 PROCEDURE — 80053 COMPREHEN METABOLIC PANEL: CPT

## 2025-01-27 PROCEDURE — 83721 ASSAY OF BLOOD LIPOPROTEIN: CPT

## 2025-01-27 PROCEDURE — 97110 THERAPEUTIC EXERCISES: CPT

## 2025-01-27 PROCEDURE — 97140 MANUAL THERAPY 1/> REGIONS: CPT

## 2025-01-27 PROCEDURE — 80061 LIPID PANEL: CPT

## 2025-01-27 PROCEDURE — 85025 COMPLETE CBC W/AUTO DIFF WBC: CPT

## 2025-01-27 PROCEDURE — 84443 ASSAY THYROID STIM HORMONE: CPT

## 2025-01-27 PROCEDURE — 83735 ASSAY OF MAGNESIUM: CPT

## 2025-01-27 PROCEDURE — 97530 THERAPEUTIC ACTIVITIES: CPT

## 2025-01-27 PROCEDURE — 97112 NEUROMUSCULAR REEDUCATION: CPT

## 2025-01-27 RX ORDER — LEVOTHYROXINE SODIUM 75 UG/1
75 TABLET ORAL DAILY
Qty: 30 TABLET | Refills: 1 | Status: SHIPPED | OUTPATIENT
Start: 2025-01-27 | End: 2025-03-15 | Stop reason: SDUPTHER

## 2025-01-27 NOTE — PROGRESS NOTES
"Daily Note     Today's date: 2025  Patient name: Sudheer Ortega  : 1990  MRN: [id number]  Referring provider: Anisha Villarreal MD  Dx:   Encounter Diagnosis     ICD-10-CM    1. Right hip pain  M25.551       2. Left hip pain  M25.552                              Subjective: \" I actually felt really good after the cupping last time\"     Objective: See treatment diary below      Assessment: Sudheer presents to therapy with hip pain. Note continued stiffness in the low back today improvement noted post cupping/ manual release.  Progressed general stability exercises with increased lateral hip and glute activation. Fatigue noted with hip act. Educated on squat form to reduce valgus and facilitate increased lat hip activation. Tolerated session without adverse effects. Recommend continued skilled therapy to improve overall strength and mobility for functional return with decreased compensation and pain.        Plan: Continue per plan of care.      Precautions: migraines, hypothyroid, lumbar spondylosis, iron deficiency anemia, pregnancy current          Manuals        Cupping              Hip flexor release   PK           MFD     B LB KR B LB KR        Stick release    KR          Tennis ball release quad   KR          Neuro Re-Ed        Bridges (HEP) 5\" x 20  5\" x 20    W/ pball 5\" x 20  W/ pball 5\" x 20          Clamshells (HEP) 3\" x 20  3\" x 20  5\" x 20           S/l abduction (HEP) 3\" x 20  3\" x 20   x20         Bird dogs (HEP) x15 15x  15 X 15x with MFD  15x with MFD        SL DL      15# x 10 B         SLR with ER   15 ea           Standing hip abduction    GTB x 20 B   BTB x 20 B ankle BTB x 20 ankle B       Squats       BTB x 20        Table tops      X 15 B         June rows/ ext     15# ea x 30          Cat/ camel     15 15 with MFD  15 with MFD and brittney pose        Ther Ex        Hip ER stretch (HEP) " "3x30\"  3x30\"  3x30\"  3x30\" with rot          Kneeling squats   20x           Half kneeling c chop  Blue weighted ball 15x           Sidestepping   BTB 4 laps   BTB x 5 laps ankle  BTB x 5 laps        LP    145# x 20          Lunges   10x ea static           Bridges off mat     26# x 20  10\" x 10        Multifidi lifts    X10 B  15 B 15 B with MFD  15 B with MFD        Wall sits    3x30\"           Open book stretch     5x10\"   Open book in quad with MFD        Ther Activity 12/16 12/23 12/30 1/13 1/20 1/27       Elliptical   6' 6' nv 5'  5'       Patient education  KR PK KR KR KR KR       Suitcase marches     15# x 5 laps   15# x 5 laps        Box push       3 laps        FOTO     X        Modalities 12/16 12/23 12/30 1/13 1/20 1/27                                      "

## 2025-01-27 NOTE — TELEPHONE ENCOUNTER
Patient had to reschedule 1/29 annual appointment- next avail in March - she wanted to fyi she did get the blood work done this morning though and will need a refill for her levothyroxine before March appointment.   Advised patient to call closer to when she needs the refill, just CYNDI

## 2025-01-27 NOTE — RESULT ENCOUNTER NOTE
Unstable labs - please call patient.    HypOthyroidism - Please congratulate patient on her pregnancy.  As she is in her 1st trimester, her thyroid is not at goal for pregnancy and is underactive.   eRx sent to increase Synthroid 75mcg 1 pill daily.  Patient should discuss further thyroid management and follow-up thyroid labs to be done in 6 weeks with OB.      TSH Goal - First trimester 0.100 to 2.500 uIU/mL    Message sent to patient via [facility] patient portal.    Nurse to also call patient.        ^^^^  Stable labs - will review with patient at upcoming appointment.    Hyperlipidemia - Recommend lifestyle modifications.    Recommend lifestyle modifications.    
PAST MEDICAL HISTORY:  BPH (benign prostatic hyperplasia)     Dementia     HTN (hypertension) Off meds x many years    Hypothyroidism

## 2025-02-03 ENCOUNTER — OFFICE VISIT (OUTPATIENT)
Dept: PHYSICAL THERAPY | Facility: CLINIC | Age: 35
End: 2025-02-03
Payer: COMMERCIAL

## 2025-02-03 DIAGNOSIS — M25.552 LEFT HIP PAIN: Primary | ICD-10-CM

## 2025-02-03 DIAGNOSIS — M25.551 RIGHT HIP PAIN: ICD-10-CM

## 2025-02-03 PROCEDURE — 97110 THERAPEUTIC EXERCISES: CPT

## 2025-02-03 PROCEDURE — 97112 NEUROMUSCULAR REEDUCATION: CPT

## 2025-02-03 PROCEDURE — 97140 MANUAL THERAPY 1/> REGIONS: CPT

## 2025-02-03 PROCEDURE — 97530 THERAPEUTIC ACTIVITIES: CPT

## 2025-02-03 NOTE — PROGRESS NOTES
"Daily Note     Today's date: 2/3/2025  Patient name: Sudheer Ortega  : 1990  MRN: [id number]  Referring provider: Anisha Villarreal MD  Dx:   Encounter Diagnosis     ICD-10-CM    1. Left hip pain  M25.552       2. Right hip pain  M25.551                     Start Time: 1625          Subjective: \" My back is a little stiff\" \"I squatted and it wasn't too bad\"     Objective: See treatment diary below      Assessment: Sudheer presents to therapy with hip pain. Progressed scap and postural activation tot tolerance to facilitate improved post chain activation and reduce ant load on joints. Cuing for scap activation to maximize.  Progressed  hip activation in quad noting mild fatigue with activation as well as decreased control through motion secondary to hip weakness. Cuing to reduce knee valgus with lunging to reduce WILIAN impingement.  Tolerated session without adverse effects. Recommend continued skilled therapy to improve overall strength and mobility for functional return with decreased compensation and pain.        Plan: Continue per plan of care.      Precautions: migraines, hypothyroid, lumbar spondylosis, iron deficiency anemia, pregnancy current          Manuals  2/3      Cupping              Hip flexor release   PK           MFD     B LB KR B LB KR  LB KR      Stick release    KR          Tennis ball release quad   KR          Neuro Re-Ed  2/3      Bridges (HEP) 5\" x 20  5\" x 20    W/ pball 5\" x 20  W/ pball 5\" x 20    Pball with hamstring curl x 20       Clamshells (HEP) 3\" x 20  3\" x 20  5\" x 20           S/l abduction (HEP) 3\" x 20  3\" x 20   x20         Bird dogs (HEP) x15 15x  15 X 15x with MFD  15x with MFD        Firehydrants        BTB x 15 B  and hip ext       SL DL      15# x 10 B   20# /15# rows x 30       SLR with ER   15 ea           Standing hip abduction    GTB x 20 B   BTB x 20 B ankle BTB x 20 ankle B BTB x 20 ankle       Bent " "over rows/ ext        8#x 20 ea with TA      No moneys/ horizontal abd         BTB x 30       Squats       BTB x 20  BTB x 20       Table tops      X 15 B   X15 B      June rows/ ext     15# ea x 30          Cat/ camel     15 15 with MFD  15 with MFD and brittney pose        Ther Ex 12/16 12/23 12/30 1/13 1/20 1/27 2/3      Hip ER stretch (HEP) 3x30\"  3x30\"  3x30\"  3x30\" with rot          Kneeling squats   20x           Half kneeling c chop  Blue weighted ball 15x           Sidestepping   BTB 4 laps   BTB x 5 laps ankle  BTB x 5 laps  BTB x 5 laps ankle       LP    145# x 20          Lunges   10x ea static     Walking 5# overhead x 3 laps       Bridges off mat     26# x 20  10\" x 10        Multifidi lifts    X10 B  15 B 15 B with MFD  15 B with MFD        Wall sits    3x30\"           Open book stretch     5x10\"   Open book in quad with MFD        Ther Activity 12/16 12/23 12/30 1/13 1/20 1/27 2/3      Elliptical   6' 6' nv 5'  5' 5'      Patient education  KR PK KR KR KR KR KR      Suitcase marches     15# x 5 laps   15# x 5 laps        Box push       3 laps        FOTO     X        Modalities 12/16 12/23 12/30 1/13 1/20 1/27 2/3                                     "

## 2025-02-11 ENCOUNTER — TELEPHONE (OUTPATIENT)
Age: 35
End: 2025-02-11

## 2025-02-13 ENCOUNTER — APPOINTMENT (OUTPATIENT)
Dept: PHYSICAL THERAPY | Facility: CLINIC | Age: 35
End: 2025-02-13
Payer: COMMERCIAL

## 2025-02-13 NOTE — PROGRESS NOTES
"Daily Note     Today's date: 2025  Patient name: Sudheer Ortega  : 1990  MRN: [id number]  Referring provider: Anisha Villarreal MD  Dx:   No diagnosis found.                           Subjective: \"     Objective: See treatment diary below      Assessment: Sudheer presents to therapy with hip pain. Tolerated session without adverse effects. Recommend continued skilled therapy to improve overall strength and mobility for functional return with decreased compensation and pain.        Plan: Continue per plan of care.      Precautions: migraines, hypothyroid, lumbar spondylosis, iron deficiency anemia, pregnancy current          Manuals 12/16 12/23 12/30 1/13 1/20 1/27 2/3 2/13     Cupping              Hip flexor release   PK           MFD     B LB KR B LB KR  LB KR      Stick release    KR          Tennis ball release quad   KR          Neuro Re-Ed 12/16 12/23 12/30 1/13 1/20 1/27 2/3 2/13     Bridges (HEP) 5\" x 20  5\" x 20    W/ pball 5\" x 20  W/ pball 5\" x 20    Pball with hamstring curl x 20       Clamshells (HEP) 3\" x 20  3\" x 20  5\" x 20           S/l abduction (HEP) 3\" x 20  3\" x 20   x20         Bird dogs (HEP) x15 15x  15 X 15x with MFD  15x with MFD        Firehydrants        BTB x 15 B  and hip ext       SL DL      15# x 10 B   20# /15# rows x 30       SLR with ER   15 ea           Standing hip abduction    GTB x 20 B   BTB x 20 B ankle BTB x 20 ankle B BTB x 20 ankle       Bent over rows/ ext        8#x 20 ea with TA      No moneys/ horizontal abd         BTB x 30       Squats       BTB x 20  BTB x 20       Table tops      X 15 B   X15 B      June rows/ ext     15# ea x 30          Cat/ camel     15 15 with MFD  15 with MFD and brittney pose        Ther Ex 12/16 12/23 12/30 1/13 1/20 1/27 2/3 2/13     Hip ER stretch (HEP) 3x30\"  3x30\"  3x30\"  3x30\" with rot          Kneeling squats   20x           Half kneeling c chop  Blue weighted ball 15x           Sidestepping   BTB 4 laps   BTB x 5 laps ankle  " "BTB x 5 laps  BTB x 5 laps ankle       LP    145# x 20          Lunges   10x ea static     Walking 5# overhead x 3 laps       Bridges off mat     26# x 20  10\" x 10        Multifidi lifts    X10 B  15 B 15 B with MFD  15 B with MFD        Wall sits    3x30\"           Open book stretch     5x10\"   Open book in quad with MFD        Ther Activity 12/16 12/23 12/30 1/13 1/20 1/27 2/3 2/13     Elliptical   6' 6' nv 5'  5' 5'      Patient education  KR PK KR KR KR KR KR      Suitcase marches     15# x 5 laps   15# x 5 laps        Box push       3 laps        FOTO     X        Modalities 12/16 12/23 12/30 1/13 1/20 1/27 2/3 2/13                                    "

## 2025-02-17 ENCOUNTER — APPOINTMENT (OUTPATIENT)
Dept: PHYSICAL THERAPY | Facility: CLINIC | Age: 35
End: 2025-02-17
Payer: COMMERCIAL

## 2025-02-24 ENCOUNTER — APPOINTMENT (OUTPATIENT)
Dept: PHYSICAL THERAPY | Facility: CLINIC | Age: 35
End: 2025-02-24
Payer: COMMERCIAL

## 2025-03-15 DIAGNOSIS — E03.9 HYPOTHYROIDISM, UNSPECIFIED TYPE: ICD-10-CM

## 2025-03-16 RX ORDER — LEVOTHYROXINE SODIUM 75 UG/1
75 TABLET ORAL DAILY
Qty: 30 TABLET | Refills: 5 | Status: SHIPPED | OUTPATIENT
Start: 2025-03-16

## 2025-04-21 DIAGNOSIS — E03.9 HYPOTHYROIDISM, UNSPECIFIED TYPE: ICD-10-CM

## 2025-04-21 DIAGNOSIS — G43.009 MIGRAINE WITHOUT AURA AND WITHOUT STATUS MIGRAINOSUS, NOT INTRACTABLE: ICD-10-CM

## 2025-04-22 RX ORDER — LEVOTHYROXINE SODIUM 75 UG/1
75 TABLET ORAL DAILY
Qty: 30 TABLET | Refills: 5 | Status: SHIPPED | OUTPATIENT
Start: 2025-04-22

## 2025-04-22 RX ORDER — RIZATRIPTAN BENZOATE 10 MG/1
TABLET, ORALLY DISINTEGRATING ORAL
Qty: 9 TABLET | Refills: 1 | Status: SHIPPED | OUTPATIENT
Start: 2025-04-22

## 2025-05-29 DIAGNOSIS — E03.9 HYPOTHYROIDISM, UNSPECIFIED TYPE: ICD-10-CM

## 2025-06-02 RX ORDER — LEVOTHYROXINE SODIUM 75 UG/1
75 TABLET ORAL DAILY
Qty: 30 TABLET | Refills: 0 | OUTPATIENT
Start: 2025-06-02

## 2025-06-02 NOTE — TELEPHONE ENCOUNTER
Refill denied.  Patient has enough Rx until 10/22/25.    Patient's chart states that she is pregnant.  Please confirm that OB is managing thyroid during pregnancy as is usual.      Please schedule overdue appt -     Return in about 6 weeks (around 10/10/2024) for Physical / 6mo - Hypothyroid, Migraines, Myalgias, Labs.     If patient is currently pregnant, she dose not need updated FBW lab orders at this time.

## 2025-06-27 DIAGNOSIS — G43.009 MIGRAINE WITHOUT AURA AND WITHOUT STATUS MIGRAINOSUS, NOT INTRACTABLE: ICD-10-CM

## 2025-06-27 RX ORDER — RIZATRIPTAN BENZOATE 10 MG/1
TABLET, ORALLY DISINTEGRATING ORAL
Qty: 9 TABLET | Refills: 1 | Status: SHIPPED | OUTPATIENT
Start: 2025-06-27

## 2025-07-09 ENCOUNTER — OFFICE VISIT (OUTPATIENT)
Dept: FAMILY MEDICINE CLINIC | Facility: CLINIC | Age: 35
End: 2025-07-09
Payer: COMMERCIAL

## 2025-07-09 ENCOUNTER — APPOINTMENT (OUTPATIENT)
Dept: LAB | Facility: CLINIC | Age: 35
End: 2025-07-09
Attending: FAMILY MEDICINE
Payer: COMMERCIAL

## 2025-07-09 VITALS
HEART RATE: 79 BPM | BODY MASS INDEX: 32.02 KG/M2 | OXYGEN SATURATION: 98 % | HEIGHT: 67 IN | DIASTOLIC BLOOD PRESSURE: 80 MMHG | TEMPERATURE: 98.1 F | WEIGHT: 204 LBS | SYSTOLIC BLOOD PRESSURE: 112 MMHG

## 2025-07-09 DIAGNOSIS — G43.009 MIGRAINE WITHOUT AURA AND WITHOUT STATUS MIGRAINOSUS, NOT INTRACTABLE: ICD-10-CM

## 2025-07-09 DIAGNOSIS — Z23 NEED FOR COVID-19 VACCINE: ICD-10-CM

## 2025-07-09 DIAGNOSIS — E03.9 HYPOTHYROIDISM, UNSPECIFIED TYPE: ICD-10-CM

## 2025-07-09 DIAGNOSIS — M51.16 LUMBAR DISC DISEASE WITH RADICULOPATHY: ICD-10-CM

## 2025-07-09 DIAGNOSIS — Z13.6 SCREENING FOR CARDIOVASCULAR CONDITION: ICD-10-CM

## 2025-07-09 DIAGNOSIS — N94.6 DYSMENORRHEA: ICD-10-CM

## 2025-07-09 DIAGNOSIS — D50.9 IRON DEFICIENCY ANEMIA, UNSPECIFIED IRON DEFICIENCY ANEMIA TYPE: ICD-10-CM

## 2025-07-09 DIAGNOSIS — Z13.1 SCREENING FOR DIABETES MELLITUS: ICD-10-CM

## 2025-07-09 DIAGNOSIS — M47.816 LUMBAR SPONDYLOSIS: ICD-10-CM

## 2025-07-09 DIAGNOSIS — Z00.00 ANNUAL PHYSICAL EXAM: Primary | ICD-10-CM

## 2025-07-09 DIAGNOSIS — G89.29 CHRONIC MIDLINE LOW BACK PAIN WITH LEFT-SIDED SCIATICA: ICD-10-CM

## 2025-07-09 DIAGNOSIS — M54.42 CHRONIC MIDLINE LOW BACK PAIN WITH LEFT-SIDED SCIATICA: ICD-10-CM

## 2025-07-09 DIAGNOSIS — E66.811 OBESITY (BMI 30.0-34.9): ICD-10-CM

## 2025-07-09 DIAGNOSIS — E66.811 CLASS 1 OBESITY WITHOUT SERIOUS COMORBIDITY WITH BODY MASS INDEX (BMI) OF 31.0 TO 31.9 IN ADULT, UNSPECIFIED OBESITY TYPE: ICD-10-CM

## 2025-07-09 DIAGNOSIS — E78.5 HYPERLIPIDEMIA, UNSPECIFIED HYPERLIPIDEMIA TYPE: ICD-10-CM

## 2025-07-09 LAB
25(OH)D3 SERPL-MCNC: 37.3 NG/ML (ref 30–100)
ALBUMIN SERPL BCG-MCNC: 4.5 G/DL (ref 3.5–5)
ALP SERPL-CCNC: 60 U/L (ref 34–104)
ALT SERPL W P-5'-P-CCNC: 12 U/L (ref 7–52)
ANION GAP SERPL CALCULATED.3IONS-SCNC: 4 MMOL/L (ref 4–13)
AST SERPL W P-5'-P-CCNC: 15 U/L (ref 13–39)
BASOPHILS # BLD AUTO: 0.03 THOUSANDS/ÂΜL (ref 0–0.1)
BASOPHILS NFR BLD AUTO: 1 % (ref 0–1)
BILIRUB SERPL-MCNC: 0.44 MG/DL (ref 0.2–1)
BUN SERPL-MCNC: 13 MG/DL (ref 5–25)
CALCIUM SERPL-MCNC: 9.7 MG/DL (ref 8.4–10.2)
CHLORIDE SERPL-SCNC: 106 MMOL/L (ref 96–108)
CHOLEST SERPL-MCNC: 194 MG/DL (ref ?–200)
CO2 SERPL-SCNC: 28 MMOL/L (ref 21–32)
CREAT SERPL-MCNC: 1.12 MG/DL (ref 0.6–1.3)
EOSINOPHIL # BLD AUTO: 0.03 THOUSAND/ÂΜL (ref 0–0.61)
EOSINOPHIL NFR BLD AUTO: 1 % (ref 0–6)
ERYTHROCYTE [DISTWIDTH] IN BLOOD BY AUTOMATED COUNT: 12.7 % (ref 11.6–15.1)
EST. AVERAGE GLUCOSE BLD GHB EST-MCNC: 105 MG/DL
GFR SERPL CREATININE-BSD FRML MDRD: 64 ML/MIN/1.73SQ M
GLUCOSE P FAST SERPL-MCNC: 94 MG/DL (ref 65–99)
HBA1C MFR BLD: 5.3 %
HCT VFR BLD AUTO: 44.2 % (ref 34.8–46.1)
HDLC SERPL-MCNC: 68 MG/DL
HGB BLD-MCNC: 14.8 G/DL (ref 11.5–15.4)
IMM GRANULOCYTES # BLD AUTO: 0.02 THOUSAND/UL (ref 0–0.2)
IMM GRANULOCYTES NFR BLD AUTO: 0 % (ref 0–2)
LDLC SERPL CALC-MCNC: 109 MG/DL (ref 0–100)
LDLC SERPL DIRECT ASSAY-MCNC: 120 MG/DL (ref 0–100)
LYMPHOCYTES # BLD AUTO: 1.9 THOUSANDS/ÂΜL (ref 0.6–4.47)
LYMPHOCYTES NFR BLD AUTO: 35 % (ref 14–44)
MAGNESIUM SERPL-MCNC: 2.2 MG/DL (ref 1.9–2.7)
MCH RBC QN AUTO: 31.8 PG (ref 26.8–34.3)
MCHC RBC AUTO-ENTMCNC: 33.5 G/DL (ref 31.4–37.4)
MCV RBC AUTO: 95 FL (ref 82–98)
MONOCYTES # BLD AUTO: 0.41 THOUSAND/ÂΜL (ref 0.17–1.22)
MONOCYTES NFR BLD AUTO: 8 % (ref 4–12)
NEUTROPHILS # BLD AUTO: 3.07 THOUSANDS/ÂΜL (ref 1.85–7.62)
NEUTS SEG NFR BLD AUTO: 55 % (ref 43–75)
NONHDLC SERPL-MCNC: 126 MG/DL
NRBC BLD AUTO-RTO: 0 /100 WBCS
PLATELET # BLD AUTO: 287 THOUSANDS/UL (ref 149–390)
PMV BLD AUTO: 8.9 FL (ref 8.9–12.7)
POTASSIUM SERPL-SCNC: 4.9 MMOL/L (ref 3.5–5.3)
PROT SERPL-MCNC: 7.6 G/DL (ref 6.4–8.4)
RBC # BLD AUTO: 4.66 MILLION/UL (ref 3.81–5.12)
SODIUM SERPL-SCNC: 138 MMOL/L (ref 135–147)
TRIGL SERPL-MCNC: 83 MG/DL (ref ?–150)
TSH SERPL DL<=0.05 MIU/L-ACNC: 1.94 UIU/ML (ref 0.45–4.5)
WBC # BLD AUTO: 5.46 THOUSAND/UL (ref 4.31–10.16)

## 2025-07-09 PROCEDURE — 80053 COMPREHEN METABOLIC PANEL: CPT

## 2025-07-09 PROCEDURE — 99395 PREV VISIT EST AGE 18-39: CPT | Performed by: FAMILY MEDICINE

## 2025-07-09 PROCEDURE — 83735 ASSAY OF MAGNESIUM: CPT

## 2025-07-09 PROCEDURE — 99214 OFFICE O/P EST MOD 30 MIN: CPT | Performed by: FAMILY MEDICINE

## 2025-07-09 PROCEDURE — 84443 ASSAY THYROID STIM HORMONE: CPT

## 2025-07-09 PROCEDURE — 80061 LIPID PANEL: CPT

## 2025-07-09 PROCEDURE — 36415 COLL VENOUS BLD VENIPUNCTURE: CPT

## 2025-07-09 PROCEDURE — 91320 SARSCV2 VAC 30MCG TRS-SUC IM: CPT

## 2025-07-09 PROCEDURE — 82306 VITAMIN D 25 HYDROXY: CPT

## 2025-07-09 PROCEDURE — 90480 ADMN SARSCOV2 VAC 1/ONLY CMP: CPT

## 2025-07-09 PROCEDURE — 83036 HEMOGLOBIN GLYCOSYLATED A1C: CPT

## 2025-07-09 PROCEDURE — 85025 COMPLETE CBC W/AUTO DIFF WBC: CPT

## 2025-07-09 PROCEDURE — 83721 ASSAY OF BLOOD LIPOPROTEIN: CPT

## 2025-07-09 RX ORDER — PROCHLORPERAZINE MALEATE 10 MG
10 TABLET ORAL EVERY 8 HOURS PRN
Qty: 30 TABLET | Refills: 0 | Status: SHIPPED | OUTPATIENT
Start: 2025-07-09

## 2025-07-09 RX ORDER — NORETHINDRONE ACETATE AND ETHINYL ESTRADIOL, ETHINYL ESTRADIOL AND FERROUS FUMARATE 1MG-10(24)
1 KIT ORAL DAILY
COMMUNITY

## 2025-07-09 RX ORDER — PREDNISONE 20 MG/1
60 TABLET ORAL DAILY
Qty: 15 TABLET | Refills: 0 | Status: SHIPPED | OUTPATIENT
Start: 2025-07-09 | End: 2025-07-14

## 2025-07-09 RX ORDER — PROPRANOLOL HYDROCHLORIDE 80 MG/1
80 CAPSULE, EXTENDED RELEASE ORAL
Qty: 30 CAPSULE | Refills: 1 | Status: SHIPPED | OUTPATIENT
Start: 2025-07-09

## 2025-07-09 NOTE — PROGRESS NOTES
Assessment/Plan:  Assessment & Plan  Annual physical exam  Health Maintenance   Topic Date Due    Annual Physical  05/02/2024    PT PLAN OF CARE  01/15/2025    Influenza Vaccine (1) 09/01/2025    HPV Vaccine (1 - 3-dose series) 07/09/2056 (Originally 12/14/2005)    Depression Screening  07/09/2026    Cervical Cancer Screening  06/21/2028    DTaP,Tdap,and Td Vaccines (2 - Td or Tdap) 07/22/2029    Zoster Vaccine (1 of 2) 12/14/2040    HIV Screening  Completed    Hepatitis C Screening  Completed    COVID-19 Vaccine  Completed    Meningococcal B Vaccine  Aged Out    RSV Vaccine age 0-20 Months  Aged Out    Pneumococcal Vaccine: Pediatrics (0 to 5 Years) and At-Risk Patients (6 to 49 Years)  Aged Out    HIB Vaccine  Aged Out    IPV Vaccine  Aged Out    Hepatitis A Vaccine  Aged Out    Meningococcal ACWY Vaccine  Aged Out            Hyperlipidemia, unspecified hyperlipidemia type  Pending labs.  Previously Stable s statin  Recommend lifestyle modifications.    Orders:    CBC and differential; Future    Comprehensive metabolic panel; Future    Lipid panel; Future    TSH, 3rd generation with Free T4 reflex; Future    LDL cholesterol, direct; Future     Hypothyroidism, unspecified type  Pending labs.  Previously Euthyroid.  Continue Synthroid 75 mcg daily.  Orders:    TSH, 3rd generation with Free T4 reflex; Future     Migraine without aura and without status migrainosus, not intractable  Uncontrolled.  Start Inderal LA 80mg  QHS.  Continue Maxalt MLT 10mg PRN and Compazine 10mg PRN.      For headache and migraine prevention I would suggest a combination vitamin supplement which may include 1 or more of the following ingredients:  Magnesium 400 mg , Riboflavin (vitamin B2) 400 - 600 mg,  Butterbur 150 mg (PA free). Other ingredients that may be helpful are feverfew, coenzyme Q10 100 mg three times a day,  melatonin and christina.  Some example brands that combine some of these ingredients are Migravent or Dolovent.   Orders:     Magnesium; Future    prochlorperazine (COMPAZINE) 10 mg tablet; Take 1 tablet (10 mg total) by mouth every 8 (eight) hours as needed for nausea or vomiting (Migraine)    propranolol (INDERAL LA) 80 mg 24 hr capsule; Take 1 capsule (80 mg total) by mouth daily at bedtime     Class 1 obesity without serious comorbidity with body mass index (BMI) of 31.0 to 31.9 in adult, unspecified obesity type  Stable.  Recommend lifestyle modifications.  Patient previously declined weight management consult.  To consider GLP1-agonist?    Orders:    Hemoglobin A1C; Future    Vitamin D 25 hydroxy; Future     Iron deficiency anemia, unspecified iron deficiency anemia type  Pending labs.  Continue MVI and OCP c Fe.    Orders:    CBC and differential; Future     Dysmenorrhea  On OCP per Gyn.          Chronic midline low back pain with left-sided sciatica  Management per Pain Management - overdue for appointment.  Improved s/p radiofrequency ablation. Start Prednisone 60mg QD x 5 days.  Pending Comp Spine PT consult.     Orders:    Ambulatory referral to Spine & Pain Management; Future    Ambulatory Referral to Comprehensive Spine PT; Future    predniSONE 20 mg tablet; Take 3 tablets (60 mg total) by mouth daily for 5 days     Lumbar disc disease with radiculopathy  Management per Pain Management - overdue for appointment.  Improved s/p radiofrequency ablation.  Start Prednisone 60mg QD x 5 days.  Pending Comp Spine PT consult.       Orders:    Ambulatory referral to Spine & Pain Management; Future    Ambulatory Referral to Comprehensive Spine PT; Future    predniSONE 20 mg tablet; Take 3 tablets (60 mg total) by mouth daily for 5 days     Lumbar spondylosis  Management per Pain Management - overdue for appointment.  Improved s/p radiofrequency ablation.  Start Prednisone 60mg QD x 5 days.  Pending Comp Spine PT consult.     Orders:    Ambulatory referral to Spine & Pain Management; Future    Ambulatory Referral to Comprehensive Spine  PT; Future    predniSONE 20 mg tablet; Take 3 tablets (60 mg total) by mouth daily for 5 days     Obesity (BMI 30.0-34.9)  Stable.  Recommend lifestyle modifications.           BMI 31.0-31.9,adult  Stable.  Recommend lifestyle modifications.         BMI 30.0-30.9,adult  Stable.  Recommend lifestyle modifications.         Need for COVID-19 vaccine    Orders:    COVID-19 Pfizer mRNA vaccine 12 yr and older (Comirnaty pre-filled syringe)    Screening for cardiovascular condition    Orders:    CBC and differential; Future    Comprehensive metabolic panel; Future    Lipid panel; Future    LDL cholesterol, direct; Future    Screening for diabetes mellitus    Orders:    Hemoglobin A1C; Future          Return in about 6 weeks (around 8/20/2025) for F/U Hypothyroid, Migraines, Myalgias, Labs.      Future Appointments   Date Time Provider Department Center   7/31/2025  7:30 AM Matt Elise DO [facility]   8/29/2025 11:00 AM Mag Campbell DO [facility]          Subjective:     Sudheer is a 34 y.o. female who presents today for a follow-up on her chronic medical conditions.        HPI:  Chief Complaint   Patient presents with    Back Pain     Pt is here for a physical. She states she has been having some back pain. She wants to get her thyroid checked, she did fast. She has had more migraines frequently that she would like to speak about.     Physical Exam     Physical / 6mo - Hypothyroid, Migraines, Myalgias, Labs.     Follow-up     -- Above per clinical staff and reviewed. --      HPI      Today:      Return in about 6 weeks (around 10/10/2024) for Physical / 6mo - Hypothyroid, Migraines, Myalgias, Labs.       6mo OV     S/p IAB due to nausea.          Obesity - Watching diet.  +Exercise - Works out for 45-60 minutes - Cardio, lifting, 7 days per week.  She is no longer working with a dietician at her gym.  Training for half marathon Fall 2025.       Hyperlipidemia - No statin previously        Hypothyroidism - Taking Synthroid regularly and properly.  Negative Thyroid antibodies 20.          H/O Fe Def Anemia - used to take oral supplements, then D/C due to GI upset and symptom improvement.  On MVI and OCP contains iron.       Palpitations - Occurs less than 1-2 times per week, previously occurring nightly.  Worse c increased stress.  Last saw Cardio Dr. Warren 19, 20.   Stable stress test 19.  Last Holter monitor 20 - Stable.       Migraines - On Compazine 10mg q8 hours PRN and Maxalt-MLT 10mg PRN - not needing to us 2nd dose most times - helpful.  Symptoms since 15yo.  Migraines 2 itmes weekly (previously occur 1-2 times monthly).  Worsening due to stress.  Using Excedrin PRN - helpful.  Improves c sleep, ice pack, bitemporal massage.  Denies aura.  Occurs upon awakening in the AM or at the end of the day.  Bi-temporal headache, across forhead, in band distribution.  Intense pressure.  Worse c movement, photophobia, phonophobia.  Sometimes has associated nausea and vertigo.  Sometimes lasts 6-12 hours.  Pushes fluids - about  oz water daily.  Previously on Elavil at 15yo - made her paul.          PHQ-2/9 Depression Screening    Little interest or pleasure in doing things: 0 - not at all  Feeling down, depressed, or hopeless: 0 - not at all  Trouble falling or staying asleep, or sleeping too much: 0 - not at all  Feeling tired or having little energy: 0 - not at all  Poor appetite or overeatin - not at all  Feeling bad about yourself - or that you are a failure or have let yourself or your family down: 0 - not at all  Trouble concentrating on things, such as reading the newspaper or watching television: 0 - not at all  Moving or speaking so slowly that other people could have noticed. Or the opposite - being so fidgety or restless that you have been moving around a lot more than usual: 0 - not at all  Thoughts that you would be better off dead, or of hurting yourself  in some way: 0 - not at all  PHQ-2 Score: 0  PHQ-2 Interpretation: Negative depression screen  PHQ-9 Score: 0  PHQ-9 Interpretation: No or Minimal depression       LINDA-7 Flowsheet Screening      Flowsheet Row Most Recent Value   Over the last two weeks, how often have you been bothered by the following problems?     Feeling nervous, anxious, or on edge 1   Not being able to stop or control worrying 0   Worrying too much about different things 0   Trouble relaxing  0   Being so restless that it's hard to sit still 0   Becoming easily annoyed or irritable  0   Feeling afraid as if something awful might happen 0   How difficult have these problems made it for you to do your work, take care of things at home, or get along with other people?  Not difficult at all   LINDA Score  1               Herniated Lumbar Disc - Management per Pain Management per Dr. Elise for back pain.  Overdue for appt 7/20.  Had central lumbar pain, radiating into left hip, in stomach, intermittent LLE paresthesias - symptoms x 3-4 weeks.  .  Stable with exercise.  Denies loss of bowel or bladder function.  Still feels tightness in her back.  Feels as though her muscles spasm so much 1-2 days after exercise to pull her shoulder and knee cap out of joint.  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.  Referred to Physiatry.  s/p [facility]Y at 15[facility] in [facility] - helpful.  Saw [facility] 9/24/19, s/p Lumbar Spine MRI 10/5/19 -   Showed small broad-based central perfusion protrusion type disc herniation at L5/S1.  No significant central foraminal narrowing, s/p facet radio-frequency ablation 1/8/20 per pain Management.      Right Hip Pain - Management per Sport [facility] Dr. Hennessy - Next appt 9/24 - Overdue.        Reviewed:  Labs 1/27/25, Cardio 5/9/19, Gyn 6/21/23, Physiatry 10/5/19, Pain Management 5/19/20, Rheum 2/18/21, MFM 10/19/23, Sports med 7/19/24     Dysmenorrhea - Sees Gyn Dr. Aaron at [facility] - Next appt 6/24 - Overdue.   Skips placebo week of OCP.  On Lo Loestrin OCP per Online Dr. Lane for Dentist q6.  Sees Optho q2 years.        The following portions of the patient's history were reviewed and updated as appropriate: allergies, current medications, past family history, past medical history, past social history, past surgical history and problem list.      Review of Systems   Constitutional:  Positive for fatigue. Negative for appetite change, chills, diaphoresis and fever.   Respiratory:  Negative for chest tightness and shortness of breath.    Cardiovascular:  Negative for chest pain.   Gastrointestinal:  Negative for abdominal pain, blood in stool, diarrhea, nausea and vomiting.   Genitourinary:  Negative for dysuria.   Musculoskeletal:  Positive for back pain.   Neurological:  Positive for headaches.        Current Outpatient Medications   Medication Sig Dispense Refill    Multiple Vitamin (MULTIVITAMINS PO) Take 1 tablet by mouth in the morning.      Norethin-Eth Estrad-Fe Biphas (Lo Loestrin Fe) 1 MG-10 MCG / 10 MCG TABS Take 1 tablet by mouth in the morning Per Online Provider      predniSONE 20 mg tablet Take 3 tablets (60 mg total) by mouth daily for 5 days 15 tablet 0    prochlorperazine (COMPAZINE) 10 mg tablet Take 1 tablet (10 mg total) by mouth every 8 (eight) hours as needed for nausea or vomiting (Migraine) 30 tablet 0    propranolol (INDERAL LA) 80 mg 24 hr capsule Take 1 capsule (80 mg total) by mouth daily at bedtime 30 capsule 1    rizatriptan (MAXALT-MLT) 10 mg disintegrating tablet DISSOLVE 1 TABLET BY MOUTH AT ONSET OF MIGRAINE. IF SYMPTOMS CONTINUE/RETURN MAY TAKE ANOTHER TAB 2 HOURS LATER. MAX 2/DAY 9 tablet 1    levothyroxine (Synthroid) 75 mcg tablet Take 1 tablet (75 mcg total) by mouth daily 1 pill by mouth once daily on an empty stomach, 30 minutes prior to eating food or drink. 90 tablet 1     No current facility-administered medications for this visit.       Objective:  /80   Pulse 79   " Temp 98.1 °F (36.7 °C) (Temporal)   Ht 5' 7\" (1.702 m)   Wt 92.5 kg (204 lb)   LMP 11/15/2024   SpO2 98%   Breastfeeding Unknown   BMI 31.95 kg/m²    Wt Readings from Last 3 Encounters:   07/09/25 92.5 kg (204 lb)   01/10/25 92.4 kg (203 lb 9.6 oz)   01/04/25 93.4 kg (206 lb)      BP Readings from Last 3 Encounters:   07/09/25 112/80   01/10/25 104/60   01/04/25 128/81          Physical Exam  Vitals and nursing note reviewed.   Constitutional:       General: She is not in acute distress.     Appearance: Normal appearance. She is well-developed. She is obese. She is not ill-appearing or toxic-appearing.   HENT:      Head: Normocephalic and atraumatic.      Right Ear: Tympanic membrane, ear canal and external ear normal.      Left Ear: Tympanic membrane, ear canal and external ear normal.      Nose: Nose normal.      Right Sinus: No maxillary sinus tenderness or frontal sinus tenderness.      Left Sinus: No maxillary sinus tenderness or frontal sinus tenderness.      Mouth/Throat:      Mouth: Mucous membranes are moist.      Pharynx: Oropharynx is clear. Uvula midline.      Tonsils: No tonsillar exudate.     Eyes:      Extraocular Movements: Extraocular movements intact.      Conjunctiva/sclera: Conjunctivae normal.      Pupils: Pupils are equal, round, and reactive to light.       Cardiovascular:      Rate and Rhythm: Normal rate and regular rhythm.      Pulses: Normal pulses.      Heart sounds: Normal heart sounds.   Pulmonary:      Effort: Pulmonary effort is normal.      Breath sounds: Normal breath sounds.   Abdominal:      General: Bowel sounds are normal. There is no distension.      Palpations: Abdomen is soft. There is no mass.      Tenderness: There is no abdominal tenderness. There is no guarding or rebound.     Musculoskeletal:         General: Tenderness (Central L4-L5, Right SI joint) present. No swelling.      Cervical back: Neck supple.      Right lower leg: No edema.      Left lower leg: No " "edema.      Comments: +Left Straight Leg Raise.  Decreased AROM Extension and Left Sidebending.     Lymphadenopathy:      Cervical: No cervical adenopathy.     Skin:     Findings: No rash.     Neurological:      General: No focal deficit present.      Mental Status: She is alert and oriented to person, place, and time. Mental status is at baseline.      Cranial Nerves: No cranial nerve deficit.      Sensory: No sensory deficit.      Motor: No weakness.      Coordination: Coordination normal.      Gait: Gait normal.      Deep Tendon Reflexes: Reflexes normal.     Psychiatric:         Mood and Affect: Mood normal.         Behavior: Behavior normal.         Thought Content: Thought content normal.         Judgment: Judgment normal.         Lab Results:      Lab Results   Component Value Date    WBC 5.46 07/09/2025    HGB 14.8 07/09/2025    HCT 44.2 07/09/2025     07/09/2025    TRIG 83 07/09/2025    HDL 68 07/09/2025    LDLDIRECT 120 (H) 07/09/2025    ALT 12 07/09/2025    AST 15 07/09/2025     07/01/2014    K 4.9 07/09/2025     07/09/2025    CREATININE 1.12 07/09/2025    BUN 13 07/09/2025    CO2 28 07/09/2025    GLUF 94 07/09/2025    HGBA1C 5.3 07/09/2025     Lab Results   Component Value Date    URICACID 4.0 10/22/2020     Invalid input(s): \"BASENAME\" Vitamin D    No results found.     POCT Labs        Depression Screening and Follow-up Plan: Patient was screened for depression during today's encounter. They screened negative with a PHQ-2 score of 0.                      Answers submitted by the patient for this visit:  Annual Physical (Submitted on 7/9/2025)  Diet/Nutrition choices: well balanced diet  Exercise choices: vigorous cardiovascular exercise, strength training exercises, 5-7 times a week on average  Sleep choices: 7-8 hours of sleep on average  Hearing choices: normal hearing bilateral ears  Vision choices: no vision problems  Dental choices: no dental visits for > 1 year  Do you " currently have an OB/GYN provider that you routinely follow with?: Yes  Menopausal status: premenopausal  Any history of sexual transmitted disease/infection?: No  Contraception: oral contraceptives  Do you have an advance directive/living will?: No  Do you have a durable power of  (POA)?: No

## 2025-07-09 NOTE — PATIENT INSTRUCTIONS
"Patient Education     Routine physical for adults   The Basics   Written by the doctors and editors at [facility]   What is a physical? -- A physical is a routine visit, or \"check-up,\" with your doctor. You might also hear it called a \"wellness visit\" or \"preventive visit.\"  During each visit, the doctor will:   Ask about your physical and mental health   Ask about your habits, behaviors, and lifestyle   Do an exam   Give you vaccines if needed   Talk to you about any medicines you take   Give advice about your health   Answer your questions  Getting regular check-ups is an important part of taking care of your health. It can help your doctor find and treat any problems you have. But it's also important for preventing health problems.  A routine physical is different from a \"sick visit.\" A sick visit is when you see a doctor because of a health concern or problem. Since physicals are scheduled ahead of time, you can think about what you want to ask the doctor.  How often should I get a physical? -- It depends on your age and health. In general, for people age 21 years and older:   If you are younger than 50 years, you might be able to get a physical every 3 years.   If you are 50 years or older, your doctor might recommend a physical every year.  If you have an ongoing health condition, like diabetes or high blood pressure, your doctor will probably want to see you more often.  What happens during a physical? -- In general, each visit will include:   Physical exam - The doctor or nurse will check your height, weight, heart rate, and blood pressure. They will also look at your eyes and ears. They will ask about how you are feeling and whether you have any symptoms that bother you.   Medicines - It's a good idea to bring a list of all the medicines you take to each doctor visit. Your doctor will talk to you about your medicines and answer any questions. Tell them if you are having any side effects that bother you. You " "should also tell them if you are having trouble paying for any of your medicines.   Habits and behaviors - This includes:   Your diet   Your exercise habits   Whether you smoke, drink alcohol, or use drugs   Whether you are sexually active   Whether you feel safe at home  Your doctor will talk to you about things you can do to improve your health and lower your risk of health problems. They will also offer help and support. For example, if you want to quit smoking, they can give you advice and might prescribe medicines. If you want to improve your diet or get more physical activity, they can help you with this, too.   Lab tests, if needed - The tests you get will depend on your age and situation. For example, your doctor might want to check your:   Cholesterol   Blood sugar   Iron level   Vaccines - The recommended vaccines will depend on your age, health, and what vaccines you already had. Vaccines are very important because they can prevent certain serious or deadly infections.   Discussion of screening - \"Screening\" means checking for diseases or other health problems before they cause symptoms. Your doctor can recommend screening based on your age, risk, and preferences. This might include tests to check for:   Cancer, such as breast, prostate, cervical, ovarian, colorectal, prostate, lung, or skin cancer   Sexually transmitted infections, such as chlamydia and gonorrhea   Mental health conditions like depression and anxiety  Your doctor will talk to you about the different types of screening tests. They can help you decide which screenings to have. They can also explain what the results might mean.   Answering questions - The physical is a good time to ask the doctor or nurse questions about your health. If needed, they can refer you to other doctors or specialists, too.  Adults older than 65 years often need other care, too. As you get older, your doctor will talk to you about:   How to prevent falling at " home   Hearing or vision tests   Memory testing   How to take your medicines safely   Making sure that you have the help and support you need at home  All topics are updated as new evidence becomes available and our peer review process is complete.  This topic retrieved from "[employer] on: May 02, 2024.  Topic 274799 Version 1.0  Release: 32.4.3 - C32.122  © 2024 [employer] and/or its affiliates. All rights reserved.  Consumer Information Use and Disclaimer   Disclaimer: This generalized information is a limited summary of diagnosis, treatment, and/or medication information. It is not meant to be comprehensive and should be used as a tool to help the user understand and/or assess potential diagnostic and treatment options. It does NOT include all information about conditions, treatments, medications, side effects, or risks that may apply to a specific patient. It is not intended to be medical advice or a substitute for the medical advice, diagnosis, or treatment of a health care provider based on the health care provider's examination and assessment of a patient's specific and unique circumstances. Patients must speak with a health care provider for complete information about their health, medical questions, and treatment options, including any risks or benefits regarding use of medications. This information does not endorse any treatments or medications as safe, effective, or approved for treating a specific patient. [employer] and its affiliates disclaim any warranty or liability relating to this information or the use thereof.The use of this information is governed by the Terms of Use, available at https://www.woltersDoppelgangeruwer.com/en/know/clinical-effectiveness-terms. 2024© [employer] and its affiliates and/or licensors. All rights reserved.  Copyright   © 2024 [employer] and/or its affiliates. All rights reserved.    Patient Education     High cholesterol   The Basics   Written by the doctors and  "editors at [employer]   What is cholesterol? -- Cholesterol is a substance found in blood. Everyone has some. It is needed for good health. But people sometimes have too much cholesterol.  Compared with people with normal cholesterol, people with high cholesterol have a higher risk of heart attack, stroke, and other health problems. The higher your cholesterol, the higher your risk of these problems.  Are there different types of cholesterol? -- Yes, there are a few different types. If you get a cholesterol test, you might hear your doctor or nurse talk about:   Total cholesterol   LDL cholesterol - Some people call this the \"bad\" cholesterol. That's because having high LDL levels raises your risk of heart attack, stroke, and other health problems.   HDL cholesterol - Some people call this the \"good\" cholesterol. That's because people with high HDL levels tend to have a lower risk of heart attack, stroke, and other health problems.   Non-HDL cholesterol - Non-HDL cholesterol is your total cholesterol minus your HDL cholesterol.   Triglycerides - Triglycerides are not cholesterol. They are another type of fat. But they often get measured when cholesterol is measured. (Having high triglycerides also seems to increase the risk of heart attack and stroke.)  What should my numbers be? -- Ask your doctor or nurse what your numbers should be. Different people need different goals. If you live outside of the [place], see the table (table 1).  In general, people who do not already have heart disease should aim for:   Total cholesterol below 200   LDL cholesterol below 130, or much lower if they are at risk of heart attack or stroke   HDL cholesterol above 60   Non-HDL cholesterol below 160, or lower if they are at risk of heart attack or stroke   Triglycerides below 150  Remember, though, that many people who cannot meet these goals still have a low risk of heart attack and stroke.  What should I do if I have high " "cholesterol? -- Ask your doctor what your overall risk of heart attack and stroke is. Just having high cholesterol is not always a reason to worry. Having high cholesterol is just one of many things that can increase your risk of heart attack and stroke.  Other things that increase your risk include:   Smoking   High blood pressure   Having a parent or sibling who got heart disease at a young age - Young, in this case, means younger than 55 for males and younger than 65 for females.   A diet that is not heart healthy - A \"heart-healthy\" diet includes lots of fruits and vegetables, fiber, and healthy fats (like those found in fish, nuts, and certain oils). It also means limiting sugar and unhealthy fats.   Older age  If you are at high risk of heart attack and stroke, having high cholesterol is a problem. But if you are at low risk, high cholesterol might not need treatment.  Should I take medicine to lower cholesterol? -- Not everyone who has high cholesterol needs medicines. Your doctor or nurse will decide if you need them based on your age, family history, and other health concerns.  There are many different medicines used to lower cholesterol (table 2). Some help your body make less cholesterol. Some keep your body from absorbing cholesterol from foods. Some help your body get rid of cholesterol faster. The medicines most often used to treat high cholesterol are called \"statins.\"  You should probably take a statin if you:   Already had a heart attack or stroke   Have known heart disease   Have diabetes   Have a condition called \"peripheral artery disease,\" which makes it painful to walk, and happens when the arteries in your legs get clogged with fatty deposits   Have an \"abdominal aortic aneurysm,\" which is a widening of the main artery in the belly  Most people with any of the conditions listed above should take a statin no matter what their cholesterol level is. If your doctor or nurse prescribes a statin, " "it's important to keep taking it. The medicine might not make you feel any different. But it can help prevent heart attack, stroke, and death.  If your doctor or nurse recommends taking medicine to help lower your cholesterol, make sure that you know what it is called. Follow all the instructions for how to take it. For example, some medicines work better when you take them in the evening. Some need to be taken with food.  Tell your doctor or nurse if your medicine causes any side effects that bother you. They might be able to switch you to a different medicine.  Can I lower my cholesterol without medicines? -- Yes. You can help lower your cholesterol by doing these things:   You can lower your LDL, or \"bad,\" cholesterol by avoiding red meat, butter, fried foods, cheese, and other foods that have a lot of saturated fat.   You can lower triglycerides by avoiding sugary foods, fried foods, and excess alcohol.   If you have excess weight, it can help to lose weight. Your doctor or nurse can help you do this in a healthy way.   Try to get regular physical activity. Even gentle forms of exercise, like walking, are good for your health.  Even if these steps don't change your cholesterol very much, they can improve your health in many other ways.  All topics are updated as new evidence becomes available and our peer review process is complete.  This topic retrieved from [employer] on: Mar 01, 2024.  Topic 04319 Version 25.0  Release: 32.2.4 - C32.59  © 2024 [employer] and/or its affiliates. All rights reserved.  table 1: Cholesterol and triglyceride measurements in the [place] and elsewhere     Measurement used within the [place] Milligrams/deciliter (mg/dL)  Measurement used most places outside of the [place] Millimoles/liter (mmol/Liter)     Level to aim for  Level to aim for    Total cholesterol  Below 200 Below 5.17   LDL cholesterol  Below 130, or much lower if at risk of heart attack and stroke Below 3.36, or much lower if at " risk of heart attack and stroke   HDL cholesterol  Above 60 Above 1.55   Triglycerides  Below 150 Below 1.7   Cholesterol is measured differently in the [place] than it is in most other countries. This table shows values used within and outside of the [place]. It includes the cholesterol and triglyceride levels that most people who do not have heart disease should aim for.  Graphic 32561 Version 5.0  table 2: Lipid-lowering medicines  Generic name  Brand name    Statins    Atorvastatin Lipitor   Fluvastatin Lescol, Lescol XL   Lovastatin Mevacor, Altoprev   Pitavastatin Livalo   Pravastatin Pravachol   Rosuvastatin Crestor   Simvastatin Zocor   PCSK9 inhibitors    Alirocumab Praluent   Evolocumab Repatha, Repatha SureClick   Cholesterol absorption inhibitors    Ezetimibe Zetia   Bile acid sequestrants    Cholestyramine Prevalite, Questran, Questran Light   Colesevelam Welchol   Colestipol Colestid   Niacin (nicotinic acid)    Niacin immediate release     Niacin extended release Niaspan   Fibrates    Fenofibrate Fenoglide, Tricor, Triglide, others   Gemfibrozil Lopid   Brand names listed are for medicines available in the [place] and some other countries.  Graphic 46111 Version 7.0  Consumer Information Use and Disclaimer   Disclaimer: This generalized information is a limited summary of diagnosis, treatment, and/or medication information. It is not meant to be comprehensive and should be used as a tool to help the user understand and/or assess potential diagnostic and treatment options. It does NOT include all information about conditions, treatments, medications, side effects, or risks that may apply to a specific patient. It is not intended to be medical advice or a substitute for the medical advice, diagnosis, or treatment of a health care provider based on the health care provider's examination and assessment of a patient's specific and unique circumstances. Patients must speak with a health care provider for complete  information about their health, medical questions, and treatment options, including any risks or benefits regarding use of medications. This information does not endorse any treatments or medications as safe, effective, or approved for treating a specific patient. [employer] and its affiliates disclaim any warranty or liability relating to this information or the use thereof.The use of this information is governed by the Terms of Use, available at https://www.woltersOpen Energiuwer.com/en/know/clinical-effectiveness-terms. 2024© [employer] and its affiliates and/or licensors. All rights reserved.  Copyright   © 2024 [employer] and/or its affiliates. All rights reserved.

## 2025-07-10 NOTE — ASSESSMENT & PLAN NOTE
Management per Pain Management - overdue for appointment.  Improved s/p radiofrequency ablation.  Start Prednisone 60mg QD x 5 days.  Pending Comp Spine PT consult.       Orders:    Ambulatory referral to Spine & Pain Management; Future    Ambulatory Referral to Comprehensive Spine PT; Future    predniSONE 20 mg tablet; Take 3 tablets (60 mg total) by mouth daily for 5 days

## 2025-07-10 NOTE — ASSESSMENT & PLAN NOTE
Pending labs.  Previously Stable s statin  Recommend lifestyle modifications.    Orders:    CBC and differential; Future    Comprehensive metabolic panel; Future    Lipid panel; Future    TSH, 3rd generation with Free T4 reflex; Future    LDL cholesterol, direct; Future

## 2025-07-10 NOTE — ASSESSMENT & PLAN NOTE
Uncontrolled.  Start Inderal LA 80mg  QHS.  Continue Maxalt MLT 10mg PRN and Compazine 10mg PRN.      For headache and migraine prevention I would suggest a combination vitamin supplement which may include 1 or more of the following ingredients:  Magnesium 400 mg , Riboflavin (vitamin B2) 400 - 600 mg,  Butterbur 150 mg (PA free). Other ingredients that may be helpful are feverfew, coenzyme Q10 100 mg three times a day,  melatonin and ginger.  Some example brands that combine some of these ingredients are Migravent or Dolovent.   Orders:    Magnesium; Future    prochlorperazine (COMPAZINE) 10 mg tablet; Take 1 tablet (10 mg total) by mouth every 8 (eight) hours as needed for nausea or vomiting (Migraine)    propranolol (INDERAL LA) 80 mg 24 hr capsule; Take 1 capsule (80 mg total) by mouth daily at bedtime

## 2025-07-10 NOTE — ASSESSMENT & PLAN NOTE
Stable.  Recommend lifestyle modifications.  Patient previously declined weight management consult.  To consider GLP1-agonist?    Orders:    Hemoglobin A1C; Future    Vitamin D 25 hydroxy; Future

## 2025-07-10 NOTE — ASSESSMENT & PLAN NOTE
Pending labs.  Previously Euthyroid.  Continue Synthroid 75 mcg daily.  Orders:    TSH, 3rd generation with Free T4 reflex; Future